# Patient Record
Sex: MALE | Race: WHITE | NOT HISPANIC OR LATINO | Employment: FULL TIME | URBAN - METROPOLITAN AREA
[De-identification: names, ages, dates, MRNs, and addresses within clinical notes are randomized per-mention and may not be internally consistent; named-entity substitution may affect disease eponyms.]

---

## 2017-01-09 ENCOUNTER — ALLSCRIPTS OFFICE VISIT (OUTPATIENT)
Dept: OTHER | Facility: OTHER | Age: 47
End: 2017-01-09

## 2017-01-20 ENCOUNTER — HOSPITAL ENCOUNTER (EMERGENCY)
Facility: HOSPITAL | Age: 47
Discharge: HOME | End: 2017-01-20
Admitting: EMERGENCY MEDICINE
Payer: COMMERCIAL

## 2017-01-20 VITALS
SYSTOLIC BLOOD PRESSURE: 144 MMHG | DIASTOLIC BLOOD PRESSURE: 70 MMHG | TEMPERATURE: 97.2 F | WEIGHT: 180 LBS | OXYGEN SATURATION: 100 % | BODY MASS INDEX: 26.58 KG/M2 | HEART RATE: 74 BPM | RESPIRATION RATE: 20 BRPM

## 2017-01-20 DIAGNOSIS — S61.012A LACERATION OF LEFT THUMB: Primary | ICD-10-CM

## 2017-01-20 PROCEDURE — 99282 EMERGENCY DEPT VISIT SF MDM: CPT

## 2017-01-20 RX ORDER — LIDOCAINE HYDROCHLORIDE 10 MG/ML
5 INJECTION, SOLUTION EPIDURAL; INFILTRATION; INTRACAUDAL; PERINEURAL ONCE
Status: COMPLETED | OUTPATIENT
Start: 2017-01-20 | End: 2017-01-20

## 2017-01-20 RX ORDER — CLONAZEPAM 1 MG/1
TABLET ORAL
COMMUNITY
Start: 2017-01-09 | End: 2018-03-13 | Stop reason: SDUPTHER

## 2017-01-20 RX ORDER — GINSENG 100 MG
1 CAPSULE ORAL ONCE
Status: COMPLETED | OUTPATIENT
Start: 2017-01-20 | End: 2017-01-20

## 2017-01-20 RX ORDER — DIAZEPAM 5 MG/1
5 TABLET ORAL EVERY 8 HOURS PRN
COMMUNITY
End: 2018-03-14

## 2017-01-20 RX ADMIN — LIDOCAINE HYDROCHLORIDE 5 ML: 10 INJECTION, SOLUTION EPIDURAL; INFILTRATION; INTRACAUDAL; PERINEURAL at 19:16

## 2017-01-20 RX ADMIN — BACITRACIN ZINC 1 SMALL APPLICATION: 500 OINTMENT TOPICAL at 19:16

## 2017-02-26 LAB
BUN SERPL-MCNC: 12 MG/DL (ref 6–24)
BUN/CREA RATIO (HISTORICAL): 15 (ref 9–20)
CALCIUM SERPL-MCNC: 8.9 MG/DL (ref 8.7–10.2)
CHLORIDE SERPL-SCNC: 101 MMOL/L (ref 96–106)
CO2 SERPL-SCNC: 24 MMOL/L (ref 18–29)
CREAT SERPL-MCNC: 0.79 MG/DL (ref 0.76–1.27)
EGFR AFRICAN AMERICAN (HISTORICAL): 124 ML/MIN/1.73
EGFR-AMERICAN CALC (HISTORICAL): 107 ML/MIN/1.73
GLUCOSE SERPL-MCNC: 142 MG/DL (ref 65–99)
HBA1C MFR BLD HPLC: 6 % (ref 4.8–5.6)
POTASSIUM SERPL-SCNC: 4.3 MMOL/L (ref 3.5–5.2)
SODIUM SERPL-SCNC: 139 MMOL/L (ref 134–144)

## 2017-02-27 ENCOUNTER — GENERIC CONVERSION - ENCOUNTER (OUTPATIENT)
Dept: OTHER | Facility: OTHER | Age: 47
End: 2017-02-27

## 2017-02-28 ENCOUNTER — GENERIC CONVERSION - ENCOUNTER (OUTPATIENT)
Dept: OTHER | Facility: OTHER | Age: 47
End: 2017-02-28

## 2017-03-20 ENCOUNTER — ALLSCRIPTS OFFICE VISIT (OUTPATIENT)
Dept: OTHER | Facility: OTHER | Age: 47
End: 2017-03-20

## 2017-11-20 ENCOUNTER — GENERIC CONVERSION - ENCOUNTER (OUTPATIENT)
Dept: OTHER | Facility: OTHER | Age: 47
End: 2017-11-20

## 2018-01-10 NOTE — RESULT NOTES
Verified Results  Kia SaldivaranitaRodri BMP8+eGFR [id number] 07:30AM Emily Krause     Test Name Result Flag Reference   Glucose, Serum 142 mg/dL H 65-99   BUN 12 mg/dL  6-24   Creatinine, Serum 0 79 mg/dL  0 76-1 27   eGFR If NonAfricn Am 107 mL/min/1 73  >59   eGFR If Africn Am 124 mL/min/1 73  >59   BUN/Creatinine Ratio 15  9-20   Sodium, Serum 139 mmol/L  134-144   Potassium, Serum 4 3 mmol/L  3 5-5 2   Chloride, Serum 101 mmol/L     Carbon Dioxide, Total 24 mmol/L  18-29   Calcium, Serum 8 9 mg/dL  8 7-10 2     (1) HEMOGLOBIN A1C [id number] 07:30AM Emily Glendale     Test Name Result Flag Reference   Hemoglobin A1c 6 0 % H 4 8-5 6   Pre-diabetes: 5 7 - 6 4           Diabetes: >6 4           Glycemic control for adults with diabetes: <7 0

## 2018-01-12 VITALS
WEIGHT: 182 LBS | RESPIRATION RATE: 18 BRPM | DIASTOLIC BLOOD PRESSURE: 80 MMHG | HEIGHT: 69 IN | SYSTOLIC BLOOD PRESSURE: 130 MMHG | BODY MASS INDEX: 26.96 KG/M2 | HEART RATE: 84 BPM | OXYGEN SATURATION: 98 %

## 2018-01-14 VITALS
WEIGHT: 167 LBS | SYSTOLIC BLOOD PRESSURE: 140 MMHG | DIASTOLIC BLOOD PRESSURE: 90 MMHG | OXYGEN SATURATION: 92 % | HEART RATE: 78 BPM | BODY MASS INDEX: 24.73 KG/M2 | HEIGHT: 69 IN | RESPIRATION RATE: 16 BRPM

## 2018-01-14 NOTE — RESULT NOTES
Message   Recorded as Task   Date: 02/27/2017 11:23 AM, Created By: Black Kirby   Task Name: Call Patient with results   Assigned To: Bayron Farooq   Regarding Patient: Florencio Figueroa, Status: Active   CommentLoletha Confucianist - 27 Feb 2017 11:23 AM     Patient Phone: [phone]    Elevate FBS, but good A1C- should have appt 3-4 Tiffanie Savana - 28 Feb 2017 9:50 AM     TASK EDITED  PT AWARE          Signatures   Electronically signed by : Lyn Guy, ; Feb 28 2017  9:50AM EST                       (Author)

## 2018-01-22 VITALS
DIASTOLIC BLOOD PRESSURE: 82 MMHG | WEIGHT: 162 LBS | BODY MASS INDEX: 23.99 KG/M2 | SYSTOLIC BLOOD PRESSURE: 126 MMHG | HEIGHT: 69 IN

## 2018-02-24 ENCOUNTER — TELEPHONE (OUTPATIENT)
Dept: FAMILY MEDICINE CLINIC | Facility: CLINIC | Age: 48
End: 2018-02-24

## 2018-02-24 DIAGNOSIS — Z12.5 SCREENING PSA (PROSTATE SPECIFIC ANTIGEN): Primary | ICD-10-CM

## 2018-02-24 DIAGNOSIS — E78.00 ELEVATED CHOLESTEROL: ICD-10-CM

## 2018-02-24 DIAGNOSIS — R73.01 ABNORMAL FASTING GLUCOSE: ICD-10-CM

## 2018-03-13 DIAGNOSIS — F41.9 ANXIETY: Primary | ICD-10-CM

## 2018-03-14 RX ORDER — CLONAZEPAM 1 MG/1
TABLET ORAL
Qty: 90 TABLET | Refills: 2 | Status: SHIPPED | OUTPATIENT
Start: 2018-03-14 | End: 2018-06-12 | Stop reason: SDUPTHER

## 2018-03-15 LAB
ALBUMIN SERPL-MCNC: 4.4 G/DL (ref 3.5–5.5)
ALBUMIN/GLOB SERPL: 1.8 {RATIO} (ref 1.2–2.2)
ALP SERPL-CCNC: 45 IU/L (ref 39–117)
ALT SERPL-CCNC: 20 IU/L (ref 0–44)
AST SERPL-CCNC: 20 IU/L (ref 0–40)
BILIRUB SERPL-MCNC: 0.5 MG/DL (ref 0–1.2)
BUN SERPL-MCNC: 10 MG/DL (ref 6–24)
BUN/CREAT SERPL: 10 (ref 9–20)
CALCIUM SERPL-MCNC: 9.2 MG/DL (ref 8.7–10.2)
CHLORIDE SERPL-SCNC: 100 MMOL/L (ref 96–106)
CHOLEST SERPL-MCNC: 187 MG/DL (ref 100–199)
CHOLEST/HDLC SERPL: 3.6 RATIO UNITS (ref 0–5)
CO2 SERPL-SCNC: 27 MMOL/L (ref 18–29)
CREAT SERPL-MCNC: 0.97 MG/DL (ref 0.76–1.27)
EST. AVERAGE GLUCOSE BLD GHB EST-MCNC: 111 MG/DL
GLOBULIN SER-MCNC: 2.4 G/DL (ref 1.5–4.5)
GLUCOSE SERPL-MCNC: 128 MG/DL (ref 65–99)
HBA1C MFR BLD: 5.5 % (ref 4.8–5.6)
HDLC SERPL-MCNC: 52 MG/DL
LDLC SERPL CALC-MCNC: 118 MG/DL (ref 0–99)
POTASSIUM SERPL-SCNC: 4.1 MMOL/L (ref 3.5–5.2)
PROT SERPL-MCNC: 6.8 G/DL (ref 6–8.5)
PSA FREE MFR SERPL: 26.7 %
PSA FREE SERPL-MCNC: 0.24 NG/ML
PSA SERPL-MCNC: 0.9 NG/ML (ref 0–4)
SL AMB EGFR AFRICAN AMERICAN: 106 ML/MIN/1.73
SL AMB EGFR NON AFRICAN AMERICAN: 92 ML/MIN/1.73
SL AMB VLDL CHOLESTEROL CALC: 17 MG/DL (ref 5–40)
SODIUM SERPL-SCNC: 141 MMOL/L (ref 134–144)
TRIGL SERPL-MCNC: 84 MG/DL (ref 0–149)

## 2018-03-19 ENCOUNTER — OFFICE VISIT (OUTPATIENT)
Dept: FAMILY MEDICINE CLINIC | Facility: CLINIC | Age: 48
End: 2018-03-19
Payer: COMMERCIAL

## 2018-03-19 VITALS
OXYGEN SATURATION: 97 % | RESPIRATION RATE: 16 BRPM | HEART RATE: 82 BPM | SYSTOLIC BLOOD PRESSURE: 120 MMHG | DIASTOLIC BLOOD PRESSURE: 80 MMHG | HEIGHT: 69 IN | BODY MASS INDEX: 25.48 KG/M2 | WEIGHT: 172 LBS

## 2018-03-19 DIAGNOSIS — R73.01 IMPAIRED FASTING BLOOD SUGAR: ICD-10-CM

## 2018-03-19 DIAGNOSIS — Z00.00 ENCOUNTER FOR WELLNESS EXAMINATION IN ADULT: Primary | ICD-10-CM

## 2018-03-19 DIAGNOSIS — F41.1 GENERALIZED ANXIETY DISORDER: ICD-10-CM

## 2018-03-19 DIAGNOSIS — F17.210 CIGARETTE NICOTINE DEPENDENCE WITHOUT COMPLICATION: ICD-10-CM

## 2018-03-19 DIAGNOSIS — J45.20 MILD INTERMITTENT ASTHMA WITHOUT COMPLICATION: ICD-10-CM

## 2018-03-19 DIAGNOSIS — F32.1 MODERATE SINGLE CURRENT EPISODE OF MAJOR DEPRESSIVE DISORDER (HCC): ICD-10-CM

## 2018-03-19 DIAGNOSIS — Z23 NEED FOR DIPHTHERIA-TETANUS-PERTUSSIS (TDAP) VACCINE: ICD-10-CM

## 2018-03-19 PROBLEM — F32.9 SINGLE CURRENT EPISODE OF MAJOR DEPRESSIVE DISORDER: Status: ACTIVE | Noted: 2018-03-19

## 2018-03-19 PROCEDURE — 90471 IMMUNIZATION ADMIN: CPT

## 2018-03-19 PROCEDURE — 99396 PREV VISIT EST AGE 40-64: CPT | Performed by: FAMILY MEDICINE

## 2018-03-19 PROCEDURE — 99214 OFFICE O/P EST MOD 30 MIN: CPT | Performed by: FAMILY MEDICINE

## 2018-03-19 PROCEDURE — 90715 TDAP VACCINE 7 YRS/> IM: CPT

## 2018-03-19 RX ORDER — FLUOXETINE HYDROCHLORIDE 20 MG/1
40 CAPSULE ORAL DAILY
Qty: 180 EACH | Refills: 1 | Status: SHIPPED | OUTPATIENT
Start: 2018-03-19 | End: 2018-09-19 | Stop reason: ALTCHOICE

## 2018-03-19 NOTE — PROGRESS NOTES
HPI:  Amina Parry is a 49 y o  male here for his yearly health maintenance exam    Patient Active Problem List   Diagnosis    Mild intermittent asthma without complication    Elevated fasting blood sugar    Cigarette nicotine dependence without complication    Anxiety disorder     Past Medical History:   Diagnosis Date    Anxiety     Asthma     Depression     Hyperlipemia     RESOLVED [id number]    Medial meniscus tear     LAST ASSESSED [id number]    Psychiatric disorder        1  Advanced Directive: no     2  Durable Power of  for Healthcare: no     3  Social History:               Marital History:               Work Status: full time              Drug and alcohol History: no drug use              Alcohol Use: in recovery     4  General Health: good              Regular Dental Visits:no              Vision problems:no              Hearing Loss:no              Immunizations up to date: due                 Lifestyle:                           Healthy Diet:yes                          Tobacco Use:current 1/2 ppd                          Regular exercise:no                          Weight concerns:no                          Drug abuse: no     5  Reproductive Health (females only)              Premenopausal:-              Perimenopausal:-              Postmenopausal: -                 Menstrual Problems: -              Contraceptions: -              Sexually Active:yes              Pregnancy History:-     6   Over the past 2 weeks, how often have you been bothered by the following:              Little interest or pleasure in doing things:> 1/2 days              Felling down, depressed or hopeless: > 1/2 days    Current Outpatient Prescriptions   Medication Sig Dispense Refill    Albuterol Sulfate (PROAIR RESPICLICK) 774 (90 BASE) MCG/ACT AEPB Inhale      clonazePAM (KlonoPIN) 1 mg tablet TAKE ONE TABLET BY MOUTH EVERY 8 HOURS 90 tablet 2    Fluoxetine HCl, PMDD, 20 MG CAPS Take by mouth No current facility-administered medications for this visit  Allergies   Allergen Reactions    Erythromycin Diarrhea     Other reaction(s): Abdominal pain     Immunization History   Administered Date(s) Administered    Influenza Quadrivalent Preservative Free 3 years and older IM 01/09/2017       Patient Care Team:  Donnell Osman MD as PCP - General  Lubna Alvarez MD      Physical Exam :  Physical Exam  /80 (BP Location: Left arm, Patient Position: Sitting, Cuff Size: Standard)   Pulse 82   Resp 16   Ht 5' 9" (1 753 m)   Wt 78 kg (172 lb)   SpO2 97%   BMI 25 40 kg/m²     General Appearance:    Alert, cooperative, no distress, appears stated age   Head:    Normocephalic, without obvious abnormality, atraumatic   Eyes:    PERRL, conjunctiva/corneas clear, EOM's intact, fundi     benign, both eyes        Ears:    Normal TM's and external ear canals, both ears   Nose:   Nares normal, septum midline, mucosa normal, no drainage    or sinus tenderness   Throat:   Lips, mucosa, and tongue normal; teeth and gums normal   Neck:   Supple, symmetrical, trachea midline, no adenopathy;        thyroid:  No enlargement/tenderness/nodules; no carotid    bruit or JVD   Lungs:     Clear to auscultation bilaterally, respirations unlabored   Chest wall:    No tenderness or deformity   Heart:    Regular rate and rhythm, S1 and S2 normal, no murmur, rub   or gallop   Abdomen:     Soft, non-tender, bowel sounds active all four quadrants,     no masses, no organomegaly   Extremities:   Extremities normal, atraumatic, no cyanosis or edema   Pulses:   2+ and symmetric all extremities   Skin:   Skin color, texture, turgor normal, no rashes or lesions   Lymph nodes:   Cervical and supraclavicular nodes normal   Neurologic:   CNII-XII intact   Normal strength, sensation and reflexes       throughout         Reviewed Updated [facility] Prior Wellness Visits:   Last Health Maintenance visit information was reviewed, patient interviewed , no change since last HM visit yes  Last HM visit information was reviewed, patient interviewed and updates made to the record today yes    Assessment and Plan:  No diagnosis found  Health Maintenance Due   Topic Date Due    HIV SCREENING  1970    PNEUMOCOCCAL POLYSACCHARIDE VACCINE AGE 2-64 HIGH RISK  05/03/1971    Depression Screening PHQ-9  05/03/1981    DTaP,Tdap,and Td Vaccines (1 - Tdap) 05/03/1990    INFLUENZA VACCINE  09/01/2017      Patient Instructions   Adacel today  Id advanced directives discussed and forms provided  Metabolic screens are current  Cancer screens are current  Positive depression screen-patient being treated currently

## 2018-03-19 NOTE — PATIENT INSTRUCTIONS
Adacel today  Id advanced directives discussed and forms provided  Metabolic screens are current  Cancer screens are current  Positive depression screen-patient being treated currently  Continue current medications  Advise attempting decrease cigarette use  Repeat labs 1 year  Office visits every 6 months

## 2018-06-11 ENCOUNTER — OFFICE VISIT (OUTPATIENT)
Dept: FAMILY MEDICINE CLINIC | Facility: CLINIC | Age: 48
End: 2018-06-11
Payer: COMMERCIAL

## 2018-06-11 VITALS
RESPIRATION RATE: 18 BRPM | HEART RATE: 90 BPM | SYSTOLIC BLOOD PRESSURE: 110 MMHG | DIASTOLIC BLOOD PRESSURE: 70 MMHG | OXYGEN SATURATION: 97 % | BODY MASS INDEX: 25.33 KG/M2 | WEIGHT: 171 LBS | HEIGHT: 69 IN | TEMPERATURE: 98.4 F

## 2018-06-11 DIAGNOSIS — J20.9 ACUTE BRONCHITIS, UNSPECIFIED ORGANISM: Primary | ICD-10-CM

## 2018-06-11 PROCEDURE — 99213 OFFICE O/P EST LOW 20 MIN: CPT | Performed by: FAMILY MEDICINE

## 2018-06-11 NOTE — PATIENT INSTRUCTIONS
This appears to be a viral bronchitis  I would continue seeing the ProAir inhaler as needed  I will give you a written prescription for an antibiotic to be filled if symptoms persist Dulera 2 puffs twice daily- Rinse mouth after use  Robitussin or Mucinex as an expectorant

## 2018-06-11 NOTE — PROGRESS NOTES
[address] [facility]        NAME: Javi Mccarty is a 51 y o  male  : 1970    MRN: [id number]  DATE: 2018  TIME: 11:19 AM    Assessment and Plan   Acute bronchitis, unspecified organism [J20 [id number]]  1  Acute bronchitis, unspecified organism           Patient Instructions     Patient Instructions   This appears to be a viral bronchitis  I would continue seeing the ProAir inhaler as needed  I will give you a written prescription for an antibiotic to be filled if symptoms persist Dulera 2 puffs twice daily- Rinse mouth after use  Robitussin or Mucinex as an expectorant  Chief Complaint     Chief Complaint   Patient presents with    Cold Like Symptoms     cold sxs         History of Present Illness       Feeling ill over the last 3 days  Initially some fevers  The seems to have resolved  Some cough with slight wheezing sensation, this he took the inhaler he has  No current fevers  Nose has dried up because he took Afrin nasal spray  Review of Systems   Review of Systems   Constitutional: Positive for diaphoresis and fever  Negative for appetite change and chills  HENT: Positive for rhinorrhea and sinus pressure  Negative for ear pain and sore throat  Eyes: Negative for discharge, redness and itching  Respiratory: Positive for cough and wheezing  Negative for shortness of breath  Cardiovascular: Negative for chest pain and palpitations  Rapid or slow heart rate   Gastrointestinal: Positive for diarrhea  Negative for abdominal pain, nausea and vomiting           Current Medications       Current Outpatient Prescriptions:     Albuterol Sulfate (PROAIR RESPICLICK) 443 (90 BASE) MCG/ACT AEPB, Inhale, Disp: , Rfl:     clonazePAM (KlonoPIN) 1 mg tablet, TAKE ONE TABLET BY MOUTH EVERY 8 HOURS, Disp: 90 tablet, Rfl: 2    Fluoxetine HCl, PMDD, 20 MG CAPS, Take 2 capsules (40 mg total) by mouth daily, Disp: 180 each, Rfl: 1    Current Allergies Allergies as of 06/11/2018 - Reviewed 06/11/2018   Allergen Reaction Noted    Erythromycin Diarrhea 12/02/2016            The following portions of the patient's history were reviewed and updated as appropriate: allergies, current medications, past family history, past medical history, past social history, past surgical history and problem list      Past Medical History:   Diagnosis Date    Anxiety     Asthma     Depression     Hyperlipemia     RESOLVED [id number]    Medial meniscus tear     LAST ASSESSED [id number]    Psychiatric disorder        Past Surgical History:   Procedure Laterality Date    ARTHROSCOPY KNEE Right     ONSET 7/3/2014    TONSILLECTOMY AND ADENOIDECTOMY      TOOTH EXTRACTION      WISDOM TEETH       Family History   Problem Relation Age of Onset    Diabetes Mother     Hypertension Mother     Prostate cancer Father     Substance Abuse Neg Hx     Mental illness Neg Hx          Medications have been verified  Objective   /70 (BP Location: Left arm, Patient Position: Sitting, Cuff Size: Standard)   Pulse 90   Temp 98 4 °F (36 9 °C) (Oral)   Resp 18   Ht 5' 9" (1 753 m)   Wt 77 6 kg (171 lb)   SpO2 97%   BMI 25 25 kg/m²        Physical Exam     Physical Exam   Constitutional: He is oriented to person, place, and time  He appears well-developed and well-nourished  No distress  HENT:   Head: Normocephalic and atraumatic  Right Ear: Tympanic membrane, external ear and ear canal normal    Left Ear: Tympanic membrane, external ear and ear canal normal    Nose: No mucosal edema or rhinorrhea  Right sinus exhibits no maxillary sinus tenderness  Left sinus exhibits no maxillary sinus tenderness  Mouth/Throat: Uvula is midline  Mucous membranes are not pale and not dry  Normal dentition  No oropharyngeal exudate  Eyes: EOM are normal  Pupils are equal, round, and reactive to light  Right eye exhibits no discharge  Left eye exhibits no discharge     Neck: Normal range of motion  Neck supple  No thyromegaly present  Cardiovascular: Normal rate, regular rhythm, normal heart sounds and intact distal pulses  No murmur heard  Pulmonary/Chest: Effort normal  No respiratory distress  He has wheezes in the right lower field and the left lower field  He has rhonchi in the right lower field and the left lower field  He has no rales  Musculoskeletal: Normal range of motion  He exhibits no edema or tenderness  Lymphadenopathy:     He has no cervical adenopathy  Neurological: He is alert and oriented to person, place, and time  No cranial nerve deficit  Skin: He is not diaphoretic  Psychiatric: He has a normal mood and affect   His behavior is normal

## 2018-06-12 DIAGNOSIS — F41.9 ANXIETY: ICD-10-CM

## 2018-06-12 RX ORDER — CLONAZEPAM 1 MG/1
TABLET ORAL
Qty: 90 TABLET | Refills: 2 | Status: SHIPPED | OUTPATIENT
Start: 2018-06-12 | End: 2018-09-04 | Stop reason: SDUPTHER

## 2018-06-20 ENCOUNTER — OFFICE VISIT (OUTPATIENT)
Dept: URGENT CARE | Facility: CLINIC | Age: 48
End: 2018-06-20
Payer: COMMERCIAL

## 2018-06-20 ENCOUNTER — APPOINTMENT (EMERGENCY)
Dept: CT IMAGING | Facility: HOSPITAL | Age: 48
End: 2018-06-20
Payer: COMMERCIAL

## 2018-06-20 ENCOUNTER — HOSPITAL ENCOUNTER (EMERGENCY)
Facility: HOSPITAL | Age: 48
Discharge: HOME | End: 2018-06-20
Attending: EMERGENCY MEDICINE | Admitting: EMERGENCY MEDICINE
Payer: COMMERCIAL

## 2018-06-20 VITALS
WEIGHT: 170 LBS | BODY MASS INDEX: 25.18 KG/M2 | DIASTOLIC BLOOD PRESSURE: 86 MMHG | HEIGHT: 69 IN | TEMPERATURE: 97.6 F | HEART RATE: 88 BPM | RESPIRATION RATE: 24 BRPM | SYSTOLIC BLOOD PRESSURE: 110 MMHG

## 2018-06-20 VITALS
WEIGHT: 169.97 LBS | TEMPERATURE: 97 F | OXYGEN SATURATION: 100 % | RESPIRATION RATE: 20 BRPM | SYSTOLIC BLOOD PRESSURE: 132 MMHG | DIASTOLIC BLOOD PRESSURE: 86 MMHG | BODY MASS INDEX: 25.1 KG/M2 | HEART RATE: 77 BPM

## 2018-06-20 DIAGNOSIS — K57.92 DIVERTICULITIS: Primary | ICD-10-CM

## 2018-06-20 DIAGNOSIS — R10.32 LEFT LOWER QUADRANT PAIN: Primary | ICD-10-CM

## 2018-06-20 LAB
ALBUMIN SERPL BCP-MCNC: 3.8 G/DL (ref 3.5–5)
ALP SERPL-CCNC: 57 U/L (ref 46–116)
ALT SERPL W P-5'-P-CCNC: 30 U/L (ref 12–78)
ANION GAP SERPL CALCULATED.3IONS-SCNC: 10 MMOL/L (ref 4–13)
AST SERPL W P-5'-P-CCNC: 21 U/L (ref 5–45)
BASOPHILS # BLD AUTO: 0.02 THOUSANDS/ΜL (ref 0–0.1)
BASOPHILS NFR BLD AUTO: 0 % (ref 0–1)
BILIRUB SERPL-MCNC: 0.7 MG/DL (ref 0.2–1)
BILIRUB UR QL STRIP: NEGATIVE
BUN SERPL-MCNC: 11 MG/DL (ref 5–25)
CALCIUM SERPL-MCNC: 8.8 MG/DL (ref 8.3–10.1)
CHLORIDE SERPL-SCNC: 104 MMOL/L (ref 100–108)
CLARITY UR: CLEAR
CO2 SERPL-SCNC: 26 MMOL/L (ref 21–32)
COLOR UR: YELLOW
CREAT SERPL-MCNC: 0.83 MG/DL (ref 0.6–1.3)
EOSINOPHIL # BLD AUTO: 0.14 THOUSAND/ΜL (ref 0–0.61)
EOSINOPHIL NFR BLD AUTO: 2 % (ref 0–6)
ERYTHROCYTE [DISTWIDTH] IN BLOOD BY AUTOMATED COUNT: 12.6 % (ref 11.6–15.1)
GFR SERPL CREATININE-BSD FRML MDRD: 103 ML/MIN/1.73SQ M
GLUCOSE SERPL-MCNC: 126 MG/DL (ref 65–140)
GLUCOSE UR STRIP-MCNC: NEGATIVE MG/DL
HCT VFR BLD AUTO: 44.4 % (ref 36.5–49.3)
HGB BLD-MCNC: 15.4 G/DL (ref 12–17)
HGB UR QL STRIP.AUTO: NEGATIVE
IMM GRANULOCYTES # BLD AUTO: 0.02 THOUSAND/UL (ref 0–0.2)
IMM GRANULOCYTES NFR BLD AUTO: 0 % (ref 0–2)
KETONES UR STRIP-MCNC: NEGATIVE MG/DL
LEUKOCYTE ESTERASE UR QL STRIP: NEGATIVE
LIPASE SERPL-CCNC: 107 U/L (ref 73–393)
LYMPHOCYTES # BLD AUTO: 1.6 THOUSANDS/ΜL (ref 0.6–4.47)
LYMPHOCYTES NFR BLD AUTO: 17 % (ref 14–44)
MCH RBC QN AUTO: 31.6 PG (ref 26.8–34.3)
MCHC RBC AUTO-ENTMCNC: 34.7 G/DL (ref 31.4–37.4)
MCV RBC AUTO: 91 FL (ref 82–98)
MONOCYTES # BLD AUTO: 0.59 THOUSAND/ΜL (ref 0.17–1.22)
MONOCYTES NFR BLD AUTO: 6 % (ref 4–12)
NEUTROPHILS # BLD AUTO: 7.08 THOUSANDS/ΜL (ref 1.85–7.62)
NEUTS SEG NFR BLD AUTO: 75 % (ref 43–75)
NITRITE UR QL STRIP: NEGATIVE
NRBC BLD AUTO-RTO: 0 /100 WBCS
PH UR STRIP.AUTO: 7 [PH] (ref 4.5–8)
PLATELET # BLD AUTO: 303 THOUSANDS/UL (ref 149–390)
PMV BLD AUTO: 9.1 FL (ref 8.9–12.7)
POTASSIUM SERPL-SCNC: 4 MMOL/L (ref 3.5–5.3)
PROT SERPL-MCNC: 7.6 G/DL (ref 6.4–8.2)
PROT UR STRIP-MCNC: NEGATIVE MG/DL
RBC # BLD AUTO: 4.88 MILLION/UL (ref 3.88–5.62)
SODIUM SERPL-SCNC: 140 MMOL/L (ref 136–145)
SP GR UR STRIP.AUTO: <=1.005 (ref 1–1.03)
UROBILINOGEN UR QL STRIP.AUTO: 0.2 E.U./DL
WBC # BLD AUTO: 9.45 THOUSAND/UL (ref 4.31–10.16)

## 2018-06-20 PROCEDURE — 80053 COMPREHEN METABOLIC PANEL: CPT | Performed by: EMERGENCY MEDICINE

## 2018-06-20 PROCEDURE — 74177 CT ABD & PELVIS W/CONTRAST: CPT

## 2018-06-20 PROCEDURE — 83690 ASSAY OF LIPASE: CPT | Performed by: EMERGENCY MEDICINE

## 2018-06-20 PROCEDURE — 96361 HYDRATE IV INFUSION ADD-ON: CPT

## 2018-06-20 PROCEDURE — 81003 URINALYSIS AUTO W/O SCOPE: CPT | Performed by: EMERGENCY MEDICINE

## 2018-06-20 PROCEDURE — 99284 EMERGENCY DEPT VISIT MOD MDM: CPT

## 2018-06-20 PROCEDURE — 96374 THER/PROPH/DIAG INJ IV PUSH: CPT

## 2018-06-20 PROCEDURE — 36415 COLL VENOUS BLD VENIPUNCTURE: CPT | Performed by: EMERGENCY MEDICINE

## 2018-06-20 PROCEDURE — 99213 OFFICE O/P EST LOW 20 MIN: CPT | Performed by: NURSE PRACTITIONER

## 2018-06-20 PROCEDURE — 85025 COMPLETE CBC W/AUTO DIFF WBC: CPT | Performed by: EMERGENCY MEDICINE

## 2018-06-20 RX ORDER — TRAMADOL HYDROCHLORIDE 50 MG/1
50 TABLET ORAL EVERY 6 HOURS PRN
Qty: 12 TABLET | Refills: 0 | Status: SHIPPED | OUTPATIENT
Start: 2018-06-20 | End: 2018-06-30

## 2018-06-20 RX ORDER — DICYCLOMINE HCL 20 MG
20 TABLET ORAL EVERY 6 HOURS PRN
Qty: 20 TABLET | Refills: 0 | Status: SHIPPED | OUTPATIENT
Start: 2018-06-20 | End: 2018-09-19 | Stop reason: ALTCHOICE

## 2018-06-20 RX ORDER — METRONIDAZOLE 500 MG/1
500 TABLET ORAL ONCE
Status: COMPLETED | OUTPATIENT
Start: 2018-06-20 | End: 2018-06-20

## 2018-06-20 RX ORDER — KETOROLAC TROMETHAMINE 30 MG/ML
30 INJECTION, SOLUTION INTRAMUSCULAR; INTRAVENOUS ONCE
Status: COMPLETED | OUTPATIENT
Start: 2018-06-20 | End: 2018-06-20

## 2018-06-20 RX ORDER — CIPROFLOXACIN 500 MG/1
500 TABLET, FILM COATED ORAL 2 TIMES DAILY
Qty: 20 TABLET | Refills: 0 | Status: SHIPPED | OUTPATIENT
Start: 2018-06-20 | End: 2018-06-30

## 2018-06-20 RX ORDER — CIPROFLOXACIN 500 MG/1
500 TABLET, FILM COATED ORAL ONCE
Status: COMPLETED | OUTPATIENT
Start: 2018-06-20 | End: 2018-06-20

## 2018-06-20 RX ORDER — METRONIDAZOLE 500 MG/1
500 TABLET ORAL EVERY 8 HOURS SCHEDULED
Qty: 30 TABLET | Refills: 0 | Status: SHIPPED | OUTPATIENT
Start: 2018-06-20 | End: 2018-06-30

## 2018-06-20 RX ORDER — ONDANSETRON 4 MG/1
4 TABLET, ORALLY DISINTEGRATING ORAL EVERY 8 HOURS PRN
Qty: 12 TABLET | Refills: 0 | Status: SHIPPED | OUTPATIENT
Start: 2018-06-20 | End: 2018-09-19 | Stop reason: ALTCHOICE

## 2018-06-20 RX ADMIN — KETOROLAC TROMETHAMINE 30 MG: 30 INJECTION, SOLUTION INTRAMUSCULAR; INTRAVENOUS at 10:57

## 2018-06-20 RX ADMIN — IOHEXOL 100 ML: 350 INJECTION, SOLUTION INTRAVENOUS at 10:48

## 2018-06-20 RX ADMIN — METRONIDAZOLE 500 MG: 500 TABLET ORAL at 11:53

## 2018-06-20 RX ADMIN — SODIUM CHLORIDE 1000 ML: 0.9 INJECTION, SOLUTION INTRAVENOUS at 09:31

## 2018-06-20 RX ADMIN — CIPROFLOXACIN 500 MG: 500 TABLET, FILM COATED ORAL at 11:53

## 2018-06-20 NOTE — PATIENT INSTRUCTIONS
Pt to go to [facility] ER for abdominal pain  ER made aware  Pt refused ambulance and will drive self

## 2018-06-20 NOTE — DISCHARGE INSTRUCTIONS
Clear liquids while having any pain on the antibiotics  Once pain resolved but still on antibiotics, follow a low residue diet (attached)  After antibiotics completed, resume a regular diet  Contact gastroenterology to schedule follow up once antibiotics complete or sooner for any worsening symptoms  Return for fever more than 101, worsening pain, persistent vomiting or for any concerns    Diverticulitis   WHAT YOU NEED TO KNOW:   Diverticulitis is a condition that causes small pockets along your intestine called diverticula to become inflamed or infected  This is caused by hard bowel movements, food, or bacteria that get stuck in the pockets  DISCHARGE INSTRUCTIONS:   Return to the emergency department if:   · You have bowel movement or foul-smelling discharge leaking from your vagina or in your urine  · You have severe diarrhea  · You urinate less than usual or not at all  · You are not able to have a bowel movement  · You cannot stop vomiting  · You have severe abdominal pain, a fever, and your abdomen is larger than usual      · You have new or increased blood in your bowel movements  Contact your healthcare provider if:   · You have pain when you urinate  · Your symptoms get worse or do not go away  · You have questions or concerns about your condition or care  Medicines:   · Antibiotics  may be given to help treat a bacterial infection  · Prescription pain medicine  may be given  Ask your healthcare provider how to take this medicine safely  Some prescription pain medicines contain acetaminophen  Do not take other medicines that contain acetaminophen without talking to your healthcare provider  Too much acetaminophen may cause liver damage  Prescription pain medicine may cause constipation  Ask your healthcare provider how to prevent or treat constipation  · Take your medicine as directed    Contact your healthcare provider if you think your medicine is not helping or if you have side effects  Tell him or her if you are allergic to any medicine  Keep a list of the medicines, vitamins, and herbs you take  Include the amounts, and when and why you take them  Bring the list or the pill bottles to follow-up visits  Carry your medicine list with you in case of an emergency  Clear liquid diet:  A clear liquid diet includes any liquids that you can see through  Examples include water, ginger-juanito, cranberry or apple juice, frozen fruit ice, or broth  Stay on a clear liquid diet until your symptoms are gone, or as directed  Follow up with your healthcare provider as directed: You may need to return for a colonoscopy  When your symptoms are gone, you may need a low-fat, high-fiber diet to prevent diverticulitis from developing again  Your healthcare provider or dietitian can help you create meal plans  Write down your questions so you remember to ask them during your visits  © 2017 [employer] Information is for End User's use only and may not be sold, redistributed or otherwise used for commercial purposes  All illustrations and images included in CareNotes® are the copyrighted property of [employer]  or [employer]  The above information is an  only  It is not intended as medical advice for individual conditions or treatments  Talk to your doctor, nurse or pharmacist before following any medical regimen to see if it is safe and effective for you  Diverticulitis Diet   WHAT YOU NEED TO KNOW:   A diverticulitis diet includes foods that allow your intestines to rest while you have diverticulitis  Diverticulitis is a condition that causes small pockets along your intestine called diverticula to become inflamed or infected  This is caused by hard bowel movement, food, or bacteria that get stuck in the pockets    DISCHARGE INSTRUCTIONS:   Foods you can eat while you have diverticulitis:   · A clear liquid diet may be recommended for 2 to 3 days  A clear liquid diet is made up of clear liquids and foods that are liquid at room temperature  Your healthcare provider will tell you when you can start eating solid foods  Examples of clear liquids include the following:     ¨ Water and clear juices (such as apple, cranberry, or grape), strained citrus juices or fruit punch    ¨ Coffee or tea (without cream or milk)    ¨ Clear sports drinks or soft drinks, such as ginger ale, lemon-lime soda, or club soda (no cola or root beer)    ¨ Clear broth, bouillon, or consommé    ¨ Plain popsicles (no popsicles with pureed fruit or fiber)    ¨ Flavored gelatin without fruit    · A low-fiber diet may be recommended until your symptoms improve  Your healthcare provider will tell you when you can slowly add high-fiber foods back into your diet  ¨ Cream of wheat and finely ground grits    ¨ White bread, white pasta, and white rice    ¨ Canned and well-cooked fruit without skins or seeds, and juice without pulp    ¨ Canned and well-cooked vegetables without skins or seeds, and vegetable juice    ¨ Cow's milk, lactose-free milk, soy milk, and rice milk    ¨ Yogurt, cottage cheese, and sherbet    ¨ Eggs, poultry (such as chicken and turkey), fish, and tender, ground, well-cooked beef     ¨ Tofu and smooth nut butters, such as peanut butter    ¨ Broth and strained soups made of low-fiber foods  Foods you should avoid while you have diverticulitis:  Avoid foods that are high in fiber while you have symptoms of diverticulitis  Examples of high-fiber foods include the following:  · Whole grains and breads, and cereals made with whole grains    · Dried fruit, fresh fruit with skin, and fruit pulp    · Raw vegetables    · Cooked greens, such as spinach    · Tough meat and meat with gristle    · Legumes, such as trinh beans and lentils  Contact your healthcare provider if:   · Your symptoms do not get better, or they get worse       · You have questions about the foods you should eat  · You have questions or concerns about your condition or care  © 2017 [employer] Information is for End User's use only and may not be sold, redistributed or otherwise used for commercial purposes  All illustrations and images included in CareNotes® are the copyrighted property of [employer]  or [address]  The above information is an  only  It is not intended as medical advice for individual conditions or treatments  Talk to your doctor, nurse or pharmacist before following any medical regimen to see if it is safe and effective for you

## 2018-06-20 NOTE — ED NOTES
Presents to ED with HX of Diverticulitis  Was referred to ED by Urgent care for same       Vince Mar RN  06/20/18 [id number]

## 2018-06-20 NOTE — ED PROVIDER NOTES
History  Chief Complaint   Patient presents with    Abdominal Pain     To ED with c/o LLQ abd  pain since yesterday  Denies any fever, chills, nausea or vomiting  States that he feels constipation, did move bowels  Hx of divertic, last episode about 4y ago  Here w/ llq pain since yesterday, gradually worsening  Notes some nausea, anorexia, no sig changes in bms or urination  Denies f/c/s  Feels like prev divertic episodes  No other co        History provided by:  Patient and spouse   used: No    Abdominal Pain   Pain location:  LLQ  Pain quality: aching and sharp    Pain radiates to:  Does not radiate  Pain severity:  Moderate  Onset quality:  Gradual  Timing:  Constant  Progression:  Waxing and waning  Chronicity:  Recurrent  Context: not previous surgeries, not recent illness, not sick contacts and not suspicious food intake    Relieved by:  Nothing  Worsened by:  Palpation and position changes  Ineffective treatments:  None tried  Associated symptoms: anorexia, flatus and nausea    Associated symptoms: no chest pain, no chills, no constipation, no diarrhea, no dysuria, no fatigue, no fever, no melena and no vomiting    Risk factors: has not had multiple surgeries        Prior to Admission Medications   Prescriptions Last Dose Informant Patient Reported? Taking?    Albuterol Sulfate (PROAIR RESPICLICK) 149 (90 BASE) MCG/ACT AEPB   Yes No   Sig: Inhale   Fluoxetine HCl, PMDD, 20 MG CAPS   No No   Sig: Take 2 capsules (40 mg total) by mouth daily   clonazePAM (KlonoPIN) 1 mg tablet   No No   Sig: TAKE 1 TABLET BY MOUTH EVERY 8 HOURS      Facility-Administered Medications: None       Past Medical History:   Diagnosis Date    Anxiety     Asthma     Depression     Hyperlipemia     RESOLVED [id number]    Medial meniscus tear     LAST ASSESSED [id number]    Psychiatric disorder        Past Surgical History:   Procedure Laterality Date    ARTHROSCOPY KNEE Right     ONSET 7/3/2014    TONSILLECTOMY AND ADENOIDECTOMY      TOOTH EXTRACTION      WISDOM TEETH       Family History   Problem Relation Age of Onset    Diabetes Mother     Hypertension Mother     Prostate cancer Father     Substance Abuse Neg Hx     Mental illness Neg Hx      I have reviewed and agree with the history as documented  Social History   Substance Use Topics    Smoking status: Current Every Day Smoker     Packs/day: 0 50     Years: 30 00     Types: Cigarettes    Smokeless tobacco: Never Used    Alcohol use No      Comment: OCCASIONAL USE PER ALLSCRIPTS         Review of Systems   Constitutional: Negative for chills, fatigue and fever  Cardiovascular: Negative for chest pain  Gastrointestinal: Positive for abdominal pain, anorexia, flatus and nausea  Negative for constipation, diarrhea, melena and vomiting  Genitourinary: Negative for dysuria  All other systems reviewed and are negative  Physical Exam  Physical Exam   Constitutional: He appears well-developed and well-nourished  HENT:   Nose: Nose normal    Eyes: Conjunctivae are normal    Neck: Neck supple  Cardiovascular: Normal rate  Pulmonary/Chest: Effort normal    Abdominal: Soft  Normal appearance and bowel sounds are normal  There is tenderness in the left lower quadrant  There is rebound  There is no rigidity, no guarding and no CVA tenderness  Musculoskeletal: He exhibits no deformity  Neurological: He is alert  Skin: Skin is warm  Psychiatric: He has a normal mood and affect  Nursing note and vitals reviewed        Vital Signs  ED Triage Vitals   Temperature Pulse Respirations Blood Pressure SpO2   06/20/18 0930 06/20/18 0930 06/20/18 0930 06/20/18 0930 06/20/18 0930   (!) 97 °F (36 1 °C) 80 20 150/90 100 %      Temp Source Heart Rate Source Patient Position - Orthostatic VS BP Location FiO2 (%)   06/20/18 0930 06/20/18 0930 06/20/18 0930 06/20/18 0930 --   Tympanic Monitor Sitting Right arm       Pain Score       06/20/18 0915 7           Vitals:    06/20/18 0930 06/20/18 1100   BP: 150/90 132/86   Pulse: 80 77   Patient Position - Orthostatic VS: Sitting        Visual Acuity      ED Medications  Medications   sodium chloride 0 9 % bolus 500 mL (0 mL Intravenous Stopped 6/20/18 1057)   ketorolac (TORADOL) injection 30 mg (30 mg Intravenous Given 6/20/18 1057)   iohexol (OMNIPAQUE) 350 MG/ML injection (MULTI-DOSE) 100 mL (100 mL Intravenous Given 6/20/18 1048)   ciprofloxacin (CIPRO) tablet 500 mg (500 mg Oral Given 6/20/18 1153)   metroNIDAZOLE (FLAGYL) tablet 500 mg (500 mg Oral Given 6/20/18 1153)       Diagnostic Studies  Results Reviewed     Procedure Component Value Units Date/Time    Comprehensive metabolic panel [[id number]] Collected:  06/20/18 0930    Lab Status:  Final result Specimen:  Blood from Arm, Right Updated:  06/20/18 1025     Sodium 140 mmol/L      Potassium 4 0 mmol/L      Chloride 104 mmol/L      CO2 26 mmol/L      Anion Gap 10 mmol/L      BUN 11 mg/dL      Creatinine 0 83 mg/dL      Glucose 126 mg/dL      Calcium 8 8 mg/dL      AST 21 U/L      ALT 30 U/L      Alkaline Phosphatase 57 U/L      Total Protein 7 6 g/dL      Albumin 3 8 g/dL      Total Bilirubin 0 70 mg/dL      eGFR 103 ml/min/1 73sq m     Narrative:         National Kidney Disease Education Program recommendations are as follows:  GFR calculation is accurate only with a steady state creatinine  Chronic Kidney disease less than 60 ml/min/1 73 sq  meters  Kidney failure less than 15 ml/min/1 73 sq  meters      Lipase [[id number]]  (Normal) Collected:  06/20/18 0930    Lab Status:  Final result Specimen:  Blood from Arm, Right Updated:  06/20/18 1025     Lipase 107 u/L     UA w Reflex to Microscopic w Reflex to Culture [[id number]] Collected:  06/20/18 0934    Lab Status:  Final result Specimen:  Urine from Urine, Clean Catch Updated:  06/20/18 1010     Color, UA Yellow     Clarity, UA Clear     Specific Gravity, UA <=1 005     pH, UA 7 0     Leukocytes, UA Negative     Nitrite, UA Negative     Protein, UA Negative mg/dl      Glucose, UA Negative mg/dl      Ketones, UA Negative mg/dl      Urobilinogen, UA 0 2 E U /dl      Bilirubin, UA Negative     Blood, UA Negative    CBC and differential [[id number]] Collected:  06/20/18 0930    Lab Status:  Final result Specimen:  Blood from Arm, Right Updated:  06/20/18 1008     WBC 9 45 Thousand/uL      RBC 4 88 Million/uL      Hemoglobin 15 4 g/dL      Hematocrit 44 4 %      MCV 91 fL      MCH 31 6 pg      MCHC 34 7 g/dL      RDW 12 6 %      MPV 9 1 fL      Platelets 928 Thousands/uL      nRBC 0 /100 WBCs      Neutrophils Relative 75 %      Immat GRANS % 0 %      Lymphocytes Relative 17 %      Monocytes Relative 6 %      Eosinophils Relative 2 %      Basophils Relative 0 %      Neutrophils Absolute 7 08 Thousands/µL      Immature Grans Absolute 0 02 Thousand/uL      Lymphocytes Absolute 1 60 Thousands/µL      Monocytes Absolute 0 59 Thousand/µL      Eosinophils Absolute 0 14 Thousand/µL      Basophils Absolute 0 02 Thousands/µL                  CT abdomen pelvis with contrast   ED Interpretation by Marcelina Quezada DO (06/20 1113)   Abnormal   See below      Final Result by Cl Craft MD (06/20 1106)      Findings most compatible with recurrent acute diverticulitis at the junction of descending and sigmoid colon segments  No secondary complications seen  Appropriate follow-up after treatment is suggested to ensure complete resolution  There is diverticulosis elsewhere along the colon as well              Workstation performed: [id number]                    Procedures  Procedures       Phone Contacts  ED Phone Contact    ED Course                               MDM  Number of Diagnoses or Management Options  Diverticulitis: new and requires workup     Amount and/or Complexity of Data Reviewed  Clinical lab tests: reviewed and ordered  Tests in the radiology section of CPT®: reviewed and ordered  Obtain history from someone other than the patient: yes  Independent visualization of images, tracings, or specimens: yes      CritCare Time    Disposition  Final diagnoses:   Diverticulitis     Time reflects when diagnosis was documented in both MDM as applicable and the Disposition within this note     Time User Action Codes Description Comment    6/20/2018 11:22 AM Kathleen Richter Add [K57 92] Diverticulitis       ED Disposition     ED Disposition Condition Comment    Discharge  Edmund Ace discharge to home/self care      Condition at discharge: Good        Follow-up Information     Follow up With Specialties Details Why Contact Info    Vicky Luo MD Gastroenterology Schedule an appointment as soon as possible for a visit If symptoms worsen or call after you have completed antibiotics to schedule your follow up colonoscopy [address] [address]  [phone]            Discharge Medication List as of 6/20/2018 11:35 AM      START taking these medications    Details   ciprofloxacin (CIPRO) 500 mg tablet Take 1 tablet (500 mg total) by mouth 2 (two) times a day for 10 days, Starting Wed 6/20/2018, Until Sat 6/30/2018, Normal      dicyclomine (BENTYL) 20 mg tablet Take 1 tablet (20 mg total) by mouth every 6 (six) hours as needed (diarrhea/crampy abdominal pain), Starting Wed 6/20/2018, Normal      metroNIDAZOLE (FLAGYL) 500 mg tablet Take 1 tablet (500 mg total) by mouth every 8 (eight) hours for 10 days, Starting Wed 6/20/2018, Until Sat 6/30/2018, Normal      ondansetron (ZOFRAN-ODT) 4 mg disintegrating tablet Take 1 tablet (4 mg total) by mouth every 8 (eight) hours as needed for nausea or vomiting, Starting Wed 6/20/2018, Normal      traMADol (ULTRAM) 50 mg tablet Take 1 tablet (50 mg total) by mouth every 6 (six) hours as needed for moderate pain for up to 10 days, Starting Wed 6/20/2018, Until Sat 6/30/2018, Normal         CONTINUE these medications which have NOT CHANGED    Details   Albuterol Sulfate (PROAIR RESPICLICK) 812 (90 BASE) MCG/ACT AEPB Inhale, Starting 2/22/2016, Until Discontinued, Historical Med      clonazePAM (KlonoPIN) 1 mg tablet TAKE 1 TABLET BY MOUTH EVERY 8 HOURS, Normal      Fluoxetine HCl, PMDD, 20 MG CAPS Take 2 capsules (40 mg total) by mouth daily, Starting Mon 3/19/2018, Normal           No discharge procedures on file      ED Provider  Electronically Signed by           Cristobal Romero DO  06/20/18 6304

## 2018-06-22 ENCOUNTER — TELEPHONE (OUTPATIENT)
Dept: FAMILY MEDICINE CLINIC | Facility: CLINIC | Age: 48
End: 2018-06-22

## 2018-06-22 NOTE — TELEPHONE ENCOUNTER
Spoke to pt is aware to stay hydrated & take the imodium
Up to 8 imodium/day
WAS SEEN AT [facility] FOR DIVERTICULITIS  ON DOXYCYCLINE AND ALSO 2 OTHER ANTIBX  AT FIRST HE COULDN'T MAKE A BM, NOW HAS DIARRHEA AND WON'T STOP      [phone]
patient

## 2018-06-24 ENCOUNTER — APPOINTMENT (EMERGENCY)
Dept: CT IMAGING | Facility: HOSPITAL | Age: 48
End: 2018-06-24
Payer: COMMERCIAL

## 2018-06-24 ENCOUNTER — HOSPITAL ENCOUNTER (EMERGENCY)
Facility: HOSPITAL | Age: 48
Discharge: HOME | End: 2018-06-24
Attending: EMERGENCY MEDICINE
Payer: COMMERCIAL

## 2018-06-24 VITALS
OXYGEN SATURATION: 97 % | RESPIRATION RATE: 20 BRPM | WEIGHT: 169.97 LBS | DIASTOLIC BLOOD PRESSURE: 69 MMHG | TEMPERATURE: 97.1 F | BODY MASS INDEX: 25.1 KG/M2 | HEART RATE: 50 BPM | SYSTOLIC BLOOD PRESSURE: 110 MMHG

## 2018-06-24 DIAGNOSIS — K57.92 DIVERTICULITIS: Primary | ICD-10-CM

## 2018-06-24 LAB
ALBUMIN SERPL BCP-MCNC: 3.4 G/DL (ref 3.5–5)
ALP SERPL-CCNC: 46 U/L (ref 46–116)
ALT SERPL W P-5'-P-CCNC: 47 U/L (ref 12–78)
ANION GAP SERPL CALCULATED.3IONS-SCNC: 10 MMOL/L (ref 4–13)
AST SERPL W P-5'-P-CCNC: 40 U/L (ref 5–45)
BASOPHILS # BLD AUTO: 0.02 THOUSANDS/ΜL (ref 0–0.1)
BASOPHILS NFR BLD AUTO: 0 % (ref 0–1)
BILIRUB SERPL-MCNC: 0.4 MG/DL (ref 0.2–1)
BUN SERPL-MCNC: 9 MG/DL (ref 5–25)
CALCIUM SERPL-MCNC: 7.7 MG/DL (ref 8.3–10.1)
CHLORIDE SERPL-SCNC: 106 MMOL/L (ref 100–108)
CLARITY, POC: YELLOW
CO2 SERPL-SCNC: 25 MMOL/L (ref 21–32)
COLOR, POC: CLEAR
CREAT SERPL-MCNC: 0.91 MG/DL (ref 0.6–1.3)
EOSINOPHIL # BLD AUTO: 0.12 THOUSAND/ΜL (ref 0–0.61)
EOSINOPHIL NFR BLD AUTO: 2 % (ref 0–6)
ERYTHROCYTE [DISTWIDTH] IN BLOOD BY AUTOMATED COUNT: 12.5 % (ref 11.6–15.1)
EXT BILIRUBIN, UA: NEGATIVE
EXT BLOOD URINE: NEGATIVE
EXT GLUCOSE, UA: NEGATIVE
EXT KETONES: NEGATIVE
EXT NITRITE, UA: NEGATIVE
EXT PH, UA: 6
EXT PROTEIN, UA: NEGATIVE
EXT SPECIFIC GRAVITY, UA: 1.01
EXT UROBILINOGEN: 0.2
GFR SERPL CREATININE-BSD FRML MDRD: 99 ML/MIN/1.73SQ M
GLUCOSE SERPL-MCNC: 97 MG/DL (ref 65–140)
HCT VFR BLD AUTO: 39.2 % (ref 36.5–49.3)
HGB BLD-MCNC: 13.8 G/DL (ref 12–17)
IMM GRANULOCYTES # BLD AUTO: 0.01 THOUSAND/UL (ref 0–0.2)
IMM GRANULOCYTES NFR BLD AUTO: 0 % (ref 0–2)
LIPASE SERPL-CCNC: 139 U/L (ref 73–393)
LYMPHOCYTES # BLD AUTO: 1.66 THOUSANDS/ΜL (ref 0.6–4.47)
LYMPHOCYTES NFR BLD AUTO: 29 % (ref 14–44)
MCH RBC QN AUTO: 31.7 PG (ref 26.8–34.3)
MCHC RBC AUTO-ENTMCNC: 35.2 G/DL (ref 31.4–37.4)
MCV RBC AUTO: 90 FL (ref 82–98)
MONOCYTES # BLD AUTO: 0.36 THOUSAND/ΜL (ref 0.17–1.22)
MONOCYTES NFR BLD AUTO: 6 % (ref 4–12)
NEUTROPHILS # BLD AUTO: 3.6 THOUSANDS/ΜL (ref 1.85–7.62)
NEUTS SEG NFR BLD AUTO: 63 % (ref 43–75)
NRBC BLD AUTO-RTO: 0 /100 WBCS
PLATELET # BLD AUTO: 257 THOUSANDS/UL (ref 149–390)
PMV BLD AUTO: 9 FL (ref 8.9–12.7)
POTASSIUM SERPL-SCNC: 3.8 MMOL/L (ref 3.5–5.3)
PROT SERPL-MCNC: 6.5 G/DL (ref 6.4–8.2)
RBC # BLD AUTO: 4.36 MILLION/UL (ref 3.88–5.62)
SODIUM SERPL-SCNC: 141 MMOL/L (ref 136–145)
WBC # BLD AUTO: 5.77 THOUSAND/UL (ref 4.31–10.16)
WBC # BLD EST: NEGATIVE 10*3/UL

## 2018-06-24 PROCEDURE — 96374 THER/PROPH/DIAG INJ IV PUSH: CPT

## 2018-06-24 PROCEDURE — 99284 EMERGENCY DEPT VISIT MOD MDM: CPT

## 2018-06-24 PROCEDURE — 36415 COLL VENOUS BLD VENIPUNCTURE: CPT | Performed by: PHYSICIAN ASSISTANT

## 2018-06-24 PROCEDURE — 83690 ASSAY OF LIPASE: CPT | Performed by: PHYSICIAN ASSISTANT

## 2018-06-24 PROCEDURE — 96361 HYDRATE IV INFUSION ADD-ON: CPT

## 2018-06-24 PROCEDURE — 80053 COMPREHEN METABOLIC PANEL: CPT | Performed by: PHYSICIAN ASSISTANT

## 2018-06-24 PROCEDURE — 85025 COMPLETE CBC W/AUTO DIFF WBC: CPT | Performed by: PHYSICIAN ASSISTANT

## 2018-06-24 PROCEDURE — 74177 CT ABD & PELVIS W/CONTRAST: CPT

## 2018-06-24 PROCEDURE — 81002 URINALYSIS NONAUTO W/O SCOPE: CPT | Performed by: PHYSICIAN ASSISTANT

## 2018-06-24 RX ORDER — METRONIDAZOLE 500 MG/1
500 TABLET ORAL ONCE
Status: COMPLETED | OUTPATIENT
Start: 2018-06-24 | End: 2018-06-24

## 2018-06-24 RX ORDER — ONDANSETRON 2 MG/ML
4 INJECTION INTRAMUSCULAR; INTRAVENOUS ONCE
Status: COMPLETED | OUTPATIENT
Start: 2018-06-24 | End: 2018-06-24

## 2018-06-24 RX ORDER — CIPROFLOXACIN 500 MG/1
500 TABLET, FILM COATED ORAL ONCE
Status: COMPLETED | OUTPATIENT
Start: 2018-06-24 | End: 2018-06-24

## 2018-06-24 RX ADMIN — SODIUM CHLORIDE 1000 ML: 0.9 INJECTION, SOLUTION INTRAVENOUS at 14:26

## 2018-06-24 RX ADMIN — ONDANSETRON 4 MG: 2 INJECTION, SOLUTION INTRAMUSCULAR; INTRAVENOUS at 14:28

## 2018-06-24 RX ADMIN — METRONIDAZOLE 500 MG: 500 TABLET ORAL at 17:31

## 2018-06-24 RX ADMIN — IOHEXOL 100 ML: 350 INJECTION, SOLUTION INTRAVENOUS at 16:02

## 2018-06-24 RX ADMIN — CIPROFLOXACIN 500 MG: 500 TABLET, FILM COATED ORAL at 17:31

## 2018-06-24 NOTE — ED PROVIDER NOTES
History  Chief Complaint   Patient presents with    Vomiting     To ED with c/o vomiting  Pt was treated in ED for diverticulitis 6/21  States that he has been vomiting and has diarrhea  "not sure that he is keeping it down"     Patient presents to the ED with LLQ abdominal pain that is worsening over the past 4 days  Patient was seen in the ED 4 days ago and diagnosed with diverticulitis  He was started on cipro and flagyl and zofran and tramadol  Patient states he was sticking to clear liquid diet for 3 days and continues with pain and nausea and vomiting  He states he has been unable to keep anything down and zofran does not help  He continues with diarrhea and called his PCP and was told to take imodium, but it made him vomit  Patient had diverticulitis 4 years ago, but states he was not as sick as he is now  History provided by:  Patient  Vomiting   Severity:  Moderate  Duration:  4 days  Timing:  Constant  Progression:  Worsening  Chronicity:  New  Worsened by:  Liquids  Ineffective treatments:  Antiemetics  Associated symptoms: abdominal pain, chills and diarrhea    Associated symptoms: no cough, no headaches, no sore throat and no URI    Risk factors: no sick contacts, no suspect food intake and no travel to endemic areas        Prior to Admission Medications   Prescriptions Last Dose Informant Patient Reported? Taking?    Albuterol Sulfate (PROAIR RESPICLICK) 231 (90 BASE) MCG/ACT AEPB   Yes No   Sig: Inhale   Fluoxetine HCl, PMDD, 20 MG CAPS   No No   Sig: Take 2 capsules (40 mg total) by mouth daily   ciprofloxacin (CIPRO) 500 mg tablet   No No   Sig: Take 1 tablet (500 mg total) by mouth 2 (two) times a day for 10 days   clonazePAM (KlonoPIN) 1 mg tablet   No No   Sig: TAKE 1 TABLET BY MOUTH EVERY 8 HOURS   dicyclomine (BENTYL) 20 mg tablet   No No   Sig: Take 1 tablet (20 mg total) by mouth every 6 (six) hours as needed (diarrhea/crampy abdominal pain)   metroNIDAZOLE (FLAGYL) 500 mg tablet   No No   Sig: Take 1 tablet (500 mg total) by mouth every 8 (eight) hours for 10 days   ondansetron (ZOFRAN-ODT) 4 mg disintegrating tablet   No No   Sig: Take 1 tablet (4 mg total) by mouth every 8 (eight) hours as needed for nausea or vomiting   traMADol (ULTRAM) 50 mg tablet   No No   Sig: Take 1 tablet (50 mg total) by mouth every 6 (six) hours as needed for moderate pain for up to 10 days      Facility-Administered Medications: None       Past Medical History:   Diagnosis Date    Anxiety     Asthma     Depression     Hyperlipemia     RESOLVED [id number]    Medial meniscus tear     LAST ASSESSED [id number]    Psychiatric disorder        Past Surgical History:   Procedure Laterality Date    ARTHROSCOPY KNEE Right     ONSET 7/3/2014    TONSILLECTOMY AND ADENOIDECTOMY      TOOTH EXTRACTION      WISDOM TEETH       Family History   Problem Relation Age of Onset    Diabetes Mother     Hypertension Mother     Prostate cancer Father     Substance Abuse Neg Hx     Mental illness Neg Hx      I have reviewed and agree with the history as documented  Social History   Substance Use Topics    Smoking status: Current Every Day Smoker     Packs/day: 0 50     Years: 30 00     Types: Cigarettes    Smokeless tobacco: Never Used    Alcohol use No      Comment: OCCASIONAL USE PER ALLSCRIPTS         Review of Systems   Constitutional: Positive for chills  HENT: Negative for sore throat  Respiratory: Negative for cough and shortness of breath  Gastrointestinal: Positive for abdominal pain, diarrhea, nausea and vomiting  Negative for blood in stool  Musculoskeletal: Negative for back pain and neck pain  Skin: Negative for color change and rash  Neurological: Positive for dizziness and light-headedness  Negative for weakness and headaches  Psychiatric/Behavioral: Negative for confusion  All other systems reviewed and are negative        Physical Exam  Physical Exam   Constitutional: He is oriented to person, place, and time  He appears well-developed and well-nourished  He is active and cooperative  He does not appear ill  No distress  HENT:   Head: Normocephalic and atraumatic  Right Ear: Hearing normal    Left Ear: Hearing normal    Nose: Nose normal    Mouth/Throat: Oropharynx is clear and moist  Mucous membranes are pale and dry  Eyes: Conjunctivae are normal    Neck: Normal range of motion  Cardiovascular: Normal rate, regular rhythm and normal heart sounds  No murmur heard  Pulmonary/Chest: Effort normal and breath sounds normal  He has no wheezes  He has no rhonchi  He has no rales  Abdominal: Soft  Normal appearance and bowel sounds are normal  There is tenderness in the left lower quadrant  There is no rigidity, no rebound and no guarding  Musculoskeletal: Normal range of motion  He exhibits no edema  Neurological: He is alert and oriented to person, place, and time  He has normal strength  No sensory deficit  Gait normal    Skin: Skin is warm and dry  No rash noted  He is not diaphoretic  No pallor  Psychiatric: He has a normal mood and affect  Nursing note and vitals reviewed        Vital Signs  ED Triage Vitals [06/24/18 1400]   Temperature Pulse Respirations Blood Pressure SpO2   (!) 97 1 °F (36 2 °C) 75 20 112/70 99 %      Temp Source Heart Rate Source Patient Position - Orthostatic VS BP Location FiO2 (%)   Tympanic Monitor Sitting Right arm --      Pain Score       5           Vitals:    06/24/18 1515 06/24/18 1530 06/24/18 1545 06/24/18 1630   BP: 105/65 107/69 109/69 110/69   Pulse: (!) 53 (!) 51 (!) 50 (!) 50   Patient Position - Orthostatic VS:           Visual Acuity      ED Medications  Medications   sodium chloride 0 9 % bolus 1,000 mL (0 mL Intravenous Stopped 6/24/18 1640)   ondansetron (ZOFRAN) injection 4 mg (4 mg Intravenous Given 6/24/18 1428)   iohexol (OMNIPAQUE) 350 MG/ML injection (SINGLE-DOSE) 100 mL (100 mL Intravenous Given 6/24/18 1602) metroNIDAZOLE (FLAGYL) tablet 500 mg (500 mg Oral Given 6/24/18 1731)   ciprofloxacin (CIPRO) tablet 500 mg (500 mg Oral Given 6/24/18 1731)       Diagnostic Studies  Results Reviewed     Procedure Component Value Units Date/Time    POCT urinalysis dipstick [[id number]]  (Normal) Resulted:  06/24/18 1731    Lab Status:  Final result Specimen:  Urine Updated:  06/24/18 1732     Color, UA clear     Clarity, UA yellow     EXT Glucose, UA (Ref: Negative) negative     EXT Bilirubin, UA (Ref: Negative) negative     EXT Ketones, UA (Ref: Negative) negative     EXT Spec Grav, UA 1 010     EXT Blood, UA (Ref: Negative) negative     EXT pH, UA 6 0     EXT Protein, UA (Ref: Negative) negative     EXT Urobilinogen, UA (Ref: 0 2- 1 0) 0 2     EXT Leukocytes, UA (Ref: Negative) negative     EXT Nitrite, UA (Ref: Negative) negative    Comprehensive metabolic panel [[id number]]  (Abnormal) Collected:  06/24/18 1428    Lab Status:  Final result Specimen:  Blood from Arm, Left Updated:  06/24/18 1516     Sodium 141 mmol/L      Potassium 3 8 mmol/L      Chloride 106 mmol/L      CO2 25 mmol/L      Anion Gap 10 mmol/L      BUN 9 mg/dL      Creatinine 0 91 mg/dL      Glucose 97 mg/dL      Calcium 7 7 (L) mg/dL      AST 40 U/L      ALT 47 U/L      Alkaline Phosphatase 46 U/L      Total Protein 6 5 g/dL      Albumin 3 4 (L) g/dL      Total Bilirubin 0 40 mg/dL      eGFR 99 ml/min/1 73sq m     Narrative:         National Kidney Disease Education Program recommendations are as follows:  GFR calculation is accurate only with a steady state creatinine  Chronic Kidney disease less than 60 ml/min/1 73 sq  meters  Kidney failure less than 15 ml/min/1 73 sq  meters      Lipase [[id number]]  (Normal) Collected:  06/24/18 1428    Lab Status:  Final result Specimen:  Blood from Arm, Left Updated:  06/24/18 1516     Lipase 139 u/L     CBC and differential [[id number]] Collected:  06/24/18 1428    Lab Status:  Final result Specimen:  Blood from Arm, Left Updated:  06/24/18 1449     WBC 5 77 Thousand/uL      RBC 4 36 Million/uL      Hemoglobin 13 8 g/dL      Hematocrit 39 2 %      MCV 90 fL      MCH 31 7 pg      MCHC 35 2 g/dL      RDW 12 5 %      MPV 9 0 fL      Platelets 620 Thousands/uL      nRBC 0 /100 WBCs      Neutrophils Relative 63 %      Immat GRANS % 0 %      Lymphocytes Relative 29 %      Monocytes Relative 6 %      Eosinophils Relative 2 %      Basophils Relative 0 %      Neutrophils Absolute 3 60 Thousands/µL      Immature Grans Absolute 0 01 Thousand/uL      Lymphocytes Absolute 1 66 Thousands/µL      Monocytes Absolute 0 36 Thousand/µL      Eosinophils Absolute 0 12 Thousand/µL      Basophils Absolute 0 02 Thousands/µL                  CT abdomen pelvis with contrast   Final Result by Jonathan De La Rosa MD (06/24 1630)      The pericolonic infiltration seen in relation to the lower descending colon at the junction of the sigmoid colon is decreasing   No fluid collection seen   Consider colonoscopy in 6-8 weeks to exclude any luminal lesion      No worsening seen   No free air seen      The study was marked in EPIC for immediate notification  Workstation performed: [id number]                    Procedures  Procedures       Phone Contacts  ED Phone Contact    ED Course  ED Course as of Jun 24 1744   Sun Jun 24, 2018   1647 Patient given water and crackers for po challenge  1724 Patient tolerating po, will discharge  Patient instructed to continue antibiotics  MDM  Number of Diagnoses or Management Options  Diverticulitis: established and worsening  Diagnosis management comments: Patient with diverticulitis with worsening diarrhea and nausea and vomiting, will check labs and CT to r/o perforation or abscess formation  CT shows improving infection, CBC normal, patient tolerating po, will discharge home          Amount and/or Complexity of Data Reviewed  Clinical lab tests: ordered and reviewed    Patient Progress  Patient progress: stable    CritCare Time    Disposition  Final diagnoses:   Diverticulitis     Time reflects when diagnosis was documented in both MDM as applicable and the Disposition within this note     Time User Action Codes Description Comment    6/24/2018  5:22 PM Ethanmatthieu Kans Add [K57 92] Diverticulitis       ED Disposition     ED Disposition Condition Comment    Discharge  Adeola Woodard discharge to home/self care      Condition at discharge: Stable        Follow-up Information     Follow up With Specialties Details Why Dorina Ramos MD Family Medicine In 2 days For recheck [address]  [address] [address]  [address] [facility]  [phone]            Discharge Medication List as of 6/24/2018  5:23 PM      CONTINUE these medications which have NOT CHANGED    Details   Albuterol Sulfate (PROAIR RESPICLICK) 021 (90 BASE) MCG/ACT AEPB Inhale, Starting 2/22/2016, Until Discontinued, Historical Med      ciprofloxacin (CIPRO) 500 mg tablet Take 1 tablet (500 mg total) by mouth 2 (two) times a day for 10 days, Starting Wed 6/20/2018, Until Sat 6/30/2018, Normal      clonazePAM (KlonoPIN) 1 mg tablet TAKE 1 TABLET BY MOUTH EVERY 8 HOURS, Normal      dicyclomine (BENTYL) 20 mg tablet Take 1 tablet (20 mg total) by mouth every 6 (six) hours as needed (diarrhea/crampy abdominal pain), Starting Wed 6/20/2018, Normal      Fluoxetine HCl, PMDD, 20 MG CAPS Take 2 capsules (40 mg total) by mouth daily, Starting Mon 3/19/2018, Normal      metroNIDAZOLE (FLAGYL) 500 mg tablet Take 1 tablet (500 mg total) by mouth every 8 (eight) hours for 10 days, Starting Wed 6/20/2018, Until Sat 6/30/2018, Normal      ondansetron (ZOFRAN-ODT) 4 mg disintegrating tablet Take 1 tablet (4 mg total) by mouth every 8 (eight) hours as needed for nausea or vomiting, Starting Wed 6/20/2018, Normal      traMADol (ULTRAM) 50 mg tablet Take 1 tablet (50 mg total) by mouth every 6 (six) hours as needed for moderate pain for up to 10 days, Starting Wed 6/20/2018, Until Sat 6/30/2018, Normal           No discharge procedures on file      ED Provider  Electronically Signed by           Donna Black PA-C  06/24/18 0779

## 2018-06-24 NOTE — DISCHARGE INSTRUCTIONS
Slowly advance diet  Fl[place]agan foods, dry toast, crackers, plain rice, then slowly advance foods  Follow up with GI doctor for recheck this week  Diverticulitis   WHAT YOU NEED TO KNOW:   Diverticulitis is a condition that causes small pockets along your intestine called diverticula to become inflamed or infected  This is caused by hard bowel movements, food, or bacteria that get stuck in the pockets  DISCHARGE INSTRUCTIONS:   Return to the emergency department if:   · You have bowel movement or foul-smelling discharge leaking from your vagina or in your urine  · You have severe diarrhea  · You urinate less than usual or not at all  · You are not able to have a bowel movement  · You cannot stop vomiting  · You have severe abdominal pain, a fever, and your abdomen is larger than usual      · You have new or increased blood in your bowel movements  Contact your healthcare provider if:   · You have pain when you urinate  · Your symptoms get worse or do not go away  · You have questions or concerns about your condition or care  Medicines:   · Antibiotics  may be given to help treat a bacterial infection  · Prescription pain medicine  may be given  Ask your healthcare provider how to take this medicine safely  Some prescription pain medicines contain acetaminophen  Do not take other medicines that contain acetaminophen without talking to your healthcare provider  Too much acetaminophen may cause liver damage  Prescription pain medicine may cause constipation  Ask your healthcare provider how to prevent or treat constipation  · Take your medicine as directed  Contact your healthcare provider if you think your medicine is not helping or if you have side effects  Tell him or her if you are allergic to any medicine  Keep a list of the medicines, vitamins, and herbs you take  Include the amounts, and when and why you take them   Bring the list or the pill bottles to follow-up visits  Carry your medicine list with you in case of an emergency  Clear liquid diet:  A clear liquid diet includes any liquids that you can see through  Examples include water, ginger-juanito, cranberry or apple juice, frozen fruit ice, or broth  Stay on a clear liquid diet until your symptoms are gone, or as directed  Follow up with your healthcare provider as directed: You may need to return for a colonoscopy  When your symptoms are gone, you may need a low-fat, high-fiber diet to prevent diverticulitis from developing again  Your healthcare provider or dietitian can help you create meal plans  Write down your questions so you remember to ask them during your visits  © 2017 [facility] Information is for End User's use only and may not be sold, redistributed or otherwise used for commercial purposes  All illustrations and images included in CareNotes® are the copyrighted property of [employer]  or [employer]  The above information is an  only  It is not intended as medical advice for individual conditions or treatments  Talk to your doctor, nurse or pharmacist before following any medical regimen to see if it is safe and effective for you

## 2018-09-04 DIAGNOSIS — F41.9 ANXIETY: ICD-10-CM

## 2018-09-04 RX ORDER — CLONAZEPAM 1 MG/1
TABLET ORAL
Qty: 90 TABLET | Refills: 0 | Status: SHIPPED | OUTPATIENT
Start: 2018-09-04 | End: 2018-09-19 | Stop reason: SDUPTHER

## 2018-09-19 ENCOUNTER — OFFICE VISIT (OUTPATIENT)
Dept: FAMILY MEDICINE CLINIC | Facility: CLINIC | Age: 48
End: 2018-09-19
Payer: COMMERCIAL

## 2018-09-19 VITALS
SYSTOLIC BLOOD PRESSURE: 100 MMHG | BODY MASS INDEX: 25.33 KG/M2 | RESPIRATION RATE: 14 BRPM | OXYGEN SATURATION: 98 % | WEIGHT: 171 LBS | HEART RATE: 78 BPM | HEIGHT: 69 IN | DIASTOLIC BLOOD PRESSURE: 60 MMHG

## 2018-09-19 DIAGNOSIS — F32.1 MODERATE SINGLE CURRENT EPISODE OF MAJOR DEPRESSIVE DISORDER (HCC): ICD-10-CM

## 2018-09-19 DIAGNOSIS — F41.9 ANXIETY: ICD-10-CM

## 2018-09-19 DIAGNOSIS — F41.1 GENERALIZED ANXIETY DISORDER: ICD-10-CM

## 2018-09-19 DIAGNOSIS — F17.210 CIGARETTE NICOTINE DEPENDENCE WITHOUT COMPLICATION: ICD-10-CM

## 2018-09-19 DIAGNOSIS — J45.20 MILD INTERMITTENT ASTHMA WITHOUT COMPLICATION: Primary | ICD-10-CM

## 2018-09-19 DIAGNOSIS — F32.1 CURRENT MODERATE EPISODE OF MAJOR DEPRESSIVE DISORDER WITHOUT PRIOR EPISODE (HCC): ICD-10-CM

## 2018-09-19 DIAGNOSIS — L72.3 SEBACEOUS CYST OF RIGHT AXILLA: ICD-10-CM

## 2018-09-19 PROCEDURE — 3008F BODY MASS INDEX DOCD: CPT | Performed by: FAMILY MEDICINE

## 2018-09-19 PROCEDURE — 99214 OFFICE O/P EST MOD 30 MIN: CPT | Performed by: FAMILY MEDICINE

## 2018-09-19 RX ORDER — FLUOXETINE HYDROCHLORIDE 40 MG/1
40 CAPSULE ORAL DAILY
Qty: 30 CAPSULE | Refills: 5 | Status: SHIPPED | OUTPATIENT
Start: 2018-09-19 | End: 2019-03-11 | Stop reason: SDUPTHER

## 2018-09-19 RX ORDER — CLONAZEPAM 1 MG/1
1 TABLET ORAL EVERY 8 HOURS
Qty: 90 TABLET | Refills: 5 | Status: ON HOLD | OUTPATIENT
Start: 2018-09-19 | End: 2018-11-26

## 2018-09-19 RX ORDER — DOXYCYCLINE HYCLATE 100 MG/1
100 CAPSULE ORAL EVERY 12 HOURS SCHEDULED
Qty: 20 CAPSULE | Refills: 1 | Status: SHIPPED | OUTPATIENT
Start: 2018-09-19 | End: 2018-09-29

## 2018-09-19 NOTE — PROGRESS NOTES
[id number]         NAME: Marysol Maddox is a 51 y o  male  : 1970    MRN: [id number]  DATE: 2018  TIME: 8:55 AM    Assessment and Plan   Mild intermittent asthma without complication [P76 84]  1  Mild intermittent asthma without complication  Albuterol Sulfate (PROAIR RESPICLICK) 754 (90 Base) MCG/ACT AEPB   2  Current moderate episode of major depressive disorder without prior episode ([address] )     3  Generalized anxiety disorder     4  Cigarette nicotine dependence without complication     5  Sebaceous cyst of right axilla  doxycycline hyclate (VIBRAMYCIN) 100 mg capsule   6  Moderate single current episode of major depressive disorder (HCC)  FLUoxetine (PROzac) 40 MG capsule   7  Anxiety  clonazePAM (KlonoPIN) 1 mg tablet    FLUoxetine (PROzac) 40 MG capsule       No problem-specific Assessment & Plan notes found for this encounter  Patient Instructions     Patient Instructions     Renew meds for anxiety and depression  Switch to 40 mg Prozac 1 tablet daily  Doxycycline for inflamed cyst under the  Right axilla  Refill of it is  after 10 days  You declines flu shots  Continue as needed ProAir for asthma  Recheck 6 months  Chief Complaint     Chief Complaint   Patient presents with    Follow-up     med check          History of Present Illness       Patient here for 6 month check on depression anxiety  Things are going well  Daily fluoxetine and and Klonopin twice daily  Asthma good as needed albuterol -generally less than twice weekly  Cigarette smoker- has tapered to less than half a pack per day  Continues to work on this  Right axilla - tender cutaneous cyst   Has been present for about 1 month  Review of Systems   Review of Systems   Constitutional: Negative for chills, diaphoresis and fatigue  HENT: Negative for congestion, rhinorrhea, sinus pain and sore throat      Respiratory: Negative for cough, chest tightness, shortness of breath and wheezing  Cardiovascular: Negative for chest pain, palpitations and leg swelling  Skin: Negative for rash and wound  Psychiatric/Behavioral: Negative for decreased concentration, dysphoric mood and sleep disturbance  The patient is not nervous/anxious  Current Medications       Current Outpatient Prescriptions:     Albuterol Sulfate (PROAIR RESPICLICK) 386 (90 Base) MCG/ACT AEPB, Inhale 2 actuation every 4 (four) hours as needed (wheezing), Disp: 1 each, Rfl: 2    clonazePAM (KlonoPIN) 1 mg tablet, Take 1 tablet (1 mg total) by mouth every 8 (eight) hours, Disp: 90 tablet, Rfl: 5    doxycycline hyclate (VIBRAMYCIN) 100 mg capsule, Take 1 capsule (100 mg total) by mouth every 12 (twelve) hours for 10 days, Disp: 20 capsule, Rfl: 1    FLUoxetine (PROzac) 40 MG capsule, Take 1 capsule (40 mg total) by mouth daily, Disp: 30 capsule, Rfl: 5    Current Allergies     Allergies as of 09/19/2018 - Reviewed 09/19/2018   Allergen Reaction Noted    Erythromycin Diarrhea 12/02/2016            The following portions of the patient's history were reviewed and updated as appropriate: allergies, current medications, past family history, past medical history, past social history, past surgical history and problem list      Past Medical History:   Diagnosis Date    Anxiety     Asthma     Depression     Hyperlipemia     RESOLVED [id number]    Medial meniscus tear     LAST ASSESSED [id number]    Psychiatric disorder        Past Surgical History:   Procedure Laterality Date    ARTHROSCOPY KNEE Right     ONSET 7/3/2014    TONSILLECTOMY AND ADENOIDECTOMY      TOOTH EXTRACTION      WISDOM TEETH       Family History   Problem Relation Age of Onset    Diabetes Mother     Hypertension Mother     Prostate cancer Father     Substance Abuse Neg Hx     Mental illness Neg Hx          Medications have been verified          Objective   /60 (BP Location: Right arm, Patient Position: Sitting, Cuff Size: Standard)   Pulse 78   Resp 14   Ht 5' 9" (1 753 m)   Wt 77 6 kg (171 lb)   SpO2 98%   BMI 25 25 kg/m²        Physical Exam     Physical Exam   Constitutional: He is oriented to person, place, and time  He appears well-developed and well-nourished  No distress  HENT:   Head: Normocephalic and atraumatic  Right Ear: Tympanic membrane and external ear normal  No drainage  Left Ear: Tympanic membrane normal  There is drainage  Mouth/Throat: No oropharyngeal exudate  Eyes: Conjunctivae and EOM are normal  Right eye exhibits no discharge  Left eye exhibits no discharge  Neck: Normal range of motion  Neck supple  No thyromegaly present  Cardiovascular: Normal rate, regular rhythm and normal heart sounds  Pulmonary/Chest: Effort normal  No respiratory distress  He has no wheezes  He has no rales  Lymphadenopathy:     He has no cervical adenopathy  Neurological: He is alert and oriented to person, place, and time  Skin:   Right axilla - 1 cm tender nodule within the skin, minimal erythema  Psychiatric: He has a normal mood and affect   His behavior is normal

## 2018-09-19 NOTE — PATIENT INSTRUCTIONS
Renew meds for anxiety and depression  Switch to 40 mg Prozac 1 tablet daily  Doxycycline for inflamed cyst under the  Right axilla  Refill of it is  after 10 days  You declines flu shots  Continue as needed ProAir for asthma  Recheck 6 months

## 2018-11-12 ENCOUNTER — OFFICE VISIT (OUTPATIENT)
Dept: FAMILY MEDICINE CLINIC | Facility: CLINIC | Age: 48
End: 2018-11-12
Payer: COMMERCIAL

## 2018-11-12 ENCOUNTER — TELEPHONE (OUTPATIENT)
Dept: FAMILY MEDICINE CLINIC | Facility: CLINIC | Age: 48
End: 2018-11-12

## 2018-11-12 VITALS
HEIGHT: 69 IN | DIASTOLIC BLOOD PRESSURE: 72 MMHG | OXYGEN SATURATION: 97 % | BODY MASS INDEX: 25.48 KG/M2 | SYSTOLIC BLOOD PRESSURE: 118 MMHG | HEART RATE: 101 BPM | WEIGHT: 172 LBS

## 2018-11-12 DIAGNOSIS — F41.1 GENERALIZED ANXIETY DISORDER: ICD-10-CM

## 2018-11-12 DIAGNOSIS — F32.1 CURRENT MODERATE EPISODE OF MAJOR DEPRESSIVE DISORDER WITHOUT PRIOR EPISODE (HCC): ICD-10-CM

## 2018-11-12 DIAGNOSIS — K57.92 DIVERTICULITIS: Primary | ICD-10-CM

## 2018-11-12 PROCEDURE — 3008F BODY MASS INDEX DOCD: CPT | Performed by: FAMILY MEDICINE

## 2018-11-12 PROCEDURE — 99214 OFFICE O/P EST MOD 30 MIN: CPT | Performed by: FAMILY MEDICINE

## 2018-11-12 RX ORDER — METRONIDAZOLE 500 MG/1
500 TABLET ORAL EVERY 8 HOURS SCHEDULED
Qty: 30 TABLET | Refills: 0 | Status: SHIPPED | OUTPATIENT
Start: 2018-11-12 | End: 2018-11-26 | Stop reason: HOSPADM

## 2018-11-12 RX ORDER — CIPROFLOXACIN 500 MG/1
500 TABLET, FILM COATED ORAL EVERY 12 HOURS SCHEDULED
Qty: 20 TABLET | Refills: 0 | Status: SHIPPED | OUTPATIENT
Start: 2018-11-12 | End: 2018-11-26 | Stop reason: HOSPADM

## 2018-11-12 RX ORDER — TRAZODONE HYDROCHLORIDE 50 MG/1
50 TABLET ORAL
Qty: 30 TABLET | Refills: 3 | Status: SHIPPED | OUTPATIENT
Start: 2018-11-12 | End: 2018-11-26 | Stop reason: HOSPADM

## 2018-11-12 NOTE — PATIENT INSTRUCTIONS
Start Flagyl and Cipro for 10 days  If you appear to bedding worse, such as very high fevers, increasing abdominal pain, repeated vomiting, or generally feeling worse you should contact us or go to the emergency room  If you are not improved but not definitively worse in 2-3 days, you should get CBC and CMP at the lab  Depression-add low-dose trazodone at bedtime to help sleep and further improve depressive symptoms

## 2018-11-12 NOTE — PROGRESS NOTES
[address]        NAME: Artist Joel is a 51 y o  male  : 1970    MRN: [id number]  DATE: 2018  TIME: 3:50 PM    Assessment and Plan   Diverticulitis [K57 92]  1  Diverticulitis  metroNIDAZOLE (FLAGYL) 500 mg tablet    ciprofloxacin (CIPRO) 500 mg tablet    CBC and differential    Comprehensive metabolic panel    CBC and differential    Comprehensive metabolic panel   2  Current moderate episode of major depressive disorder without prior episode (HCC)  traZODone (DESYREL) 50 mg tablet   3  Generalized anxiety disorder         No problem-specific Assessment & Plan notes found for this encounter  Patient Instructions     Patient Instructions   Start Flagyl and Cipro for 10 days  If you appear to bedding worse, such as very high fevers, increasing abdominal pain, repeated vomiting, or generally feeling worse you should contact us or go to the emergency room  If you are not improved but not definitively worse in 2-3 days, you should get CBC and CMP at the lab  Depression-add low-dose trazodone at bedtime to help sleep and further improve depressive symptoms  Chief Complaint     Chief Complaint   Patient presents with    Diverticulitis     for 3 days--nausea, bloating, diarrhea         History of Present Illness       Patient has had increasing left lower quadrant pain over the last 2 days  Known previous diverticular disease with attack of diverticulitis several months ago  Had had discussed with GI who advise she wait till age 47 to have a colonoscopy  Low-grade fever  No rectal bleeding noted  Depression-PHQ-9 score is 13  Also anxiety which she takes Klonopin twice daily  Review of Systems   Review of Systems   Constitutional: Positive for fever  Negative for appetite change, chills, diaphoresis and fatigue  Respiratory: Negative for cough, choking and shortness of breath      Cardiovascular: Negative for chest pain and palpitations  Gastrointestinal: Positive for abdominal pain, nausea and rectal pain  Negative for abdominal distention, blood in stool, constipation, diarrhea and vomiting  Genitourinary: Negative for dysuria, flank pain and frequency  Musculoskeletal: Negative for back pain and myalgias  Neurological: Negative for dizziness, syncope and light-headedness  Psychiatric/Behavioral: Positive for decreased concentration, dysphoric mood and sleep disturbance  Negative for suicidal ideas  The patient is nervous/anxious            Current Medications       Current Outpatient Prescriptions:     Albuterol Sulfate (PROAIR RESPICLICK) 534 (90 Base) MCG/ACT AEPB, Inhale 2 actuation every 4 (four) hours as needed (wheezing), Disp: 1 each, Rfl: 2    clonazePAM (KlonoPIN) 1 mg tablet, Take 1 tablet (1 mg total) by mouth every 8 (eight) hours, Disp: 90 tablet, Rfl: 5    FLUoxetine (PROzac) 40 MG capsule, Take 1 capsule (40 mg total) by mouth daily, Disp: 30 capsule, Rfl: 5    ciprofloxacin (CIPRO) 500 mg tablet, Take 1 tablet (500 mg total) by mouth every 12 (twelve) hours for 10 days, Disp: 20 tablet, Rfl: 0    metroNIDAZOLE (FLAGYL) 500 mg tablet, Take 1 tablet (500 mg total) by mouth every 8 (eight) hours for 10 days, Disp: 30 tablet, Rfl: 0    traZODone (DESYREL) 50 mg tablet, Take 1 tablet (50 mg total) by mouth daily at bedtime, Disp: 30 tablet, Rfl: 3    Current Allergies     Allergies as of 11/12/2018 - Reviewed 11/12/2018   Allergen Reaction Noted    Erythromycin Diarrhea 12/02/2016            The following portions of the patient's history were reviewed and updated as appropriate: allergies, current medications, past family history, past medical history, past social history, past surgical history and problem list      Past Medical History:   Diagnosis Date    Anxiety     Asthma     Depression     Hyperlipemia     RESOLVED [id number]    Medial meniscus tear     LAST ASSESSED [id number]    Psychiatric disorder        Past Surgical History:   Procedure Laterality Date    ARTHROSCOPY KNEE Right     ONSET 7/3/2014    TONSILLECTOMY AND ADENOIDECTOMY      TOOTH EXTRACTION      WISDOM TEETH       Family History   Problem Relation Age of Onset    Diabetes Mother     Hypertension Mother     Prostate cancer Father     Substance Abuse Neg Hx     Mental illness Neg Hx          Medications have been verified  Objective   /72   Pulse 101   Ht 5' 9" (1 753 m)   Wt 78 kg (172 lb)   SpO2 97%   BMI 25 40 kg/m²        Physical Exam     Physical Exam   Constitutional: He is oriented to person, place, and time  He appears well-developed and well-nourished  No distress  HENT:   Head: Normocephalic and atraumatic  Mouth/Throat: Oropharynx is clear and moist    Neck: Neck supple  No thyromegaly present  Cardiovascular: Normal rate, regular rhythm and normal heart sounds  Pulmonary/Chest: Effort normal and breath sounds normal  No respiratory distress  He has no wheezes  Abdominal: Soft  Bowel sounds are normal  He exhibits no distension and no mass  There is no hepatosplenomegaly  There is tenderness in the left lower quadrant  There is no rebound, no guarding and no CVA tenderness  No hernia  Musculoskeletal:        Right shoulder: He exhibits no tenderness  Lymphadenopathy:     He has no cervical adenopathy  Neurological: He is alert and oriented to person, place, and time  Skin: Skin is warm and dry  He is not diaphoretic

## 2018-11-14 ENCOUNTER — HOSPITAL ENCOUNTER (EMERGENCY)
Facility: HOSPITAL | Age: 48
Discharge: HOME | End: 2018-11-14
Attending: EMERGENCY MEDICINE
Payer: COMMERCIAL

## 2018-11-14 ENCOUNTER — TELEPHONE (OUTPATIENT)
Dept: FAMILY MEDICINE CLINIC | Facility: CLINIC | Age: 48
End: 2018-11-14

## 2018-11-14 ENCOUNTER — APPOINTMENT (EMERGENCY)
Dept: CT IMAGING | Facility: HOSPITAL | Age: 48
End: 2018-11-14
Payer: COMMERCIAL

## 2018-11-14 VITALS
TEMPERATURE: 96.1 F | HEIGHT: 69 IN | SYSTOLIC BLOOD PRESSURE: 115 MMHG | WEIGHT: 171 LBS | DIASTOLIC BLOOD PRESSURE: 72 MMHG | BODY MASS INDEX: 25.33 KG/M2 | HEART RATE: 70 BPM | RESPIRATION RATE: 20 BRPM | OXYGEN SATURATION: 98 %

## 2018-11-14 DIAGNOSIS — K57.32 SIGMOID DIVERTICULITIS: Primary | ICD-10-CM

## 2018-11-14 LAB
ALBUMIN SERPL BCP-MCNC: 3.8 G/DL (ref 3.5–5)
ALP SERPL-CCNC: 60 U/L (ref 46–116)
ALT SERPL W P-5'-P-CCNC: 30 U/L (ref 12–78)
ANION GAP SERPL CALCULATED.3IONS-SCNC: 8 MMOL/L (ref 4–13)
AST SERPL W P-5'-P-CCNC: 24 U/L (ref 5–45)
BASOPHILS # BLD AUTO: 0.01 THOUSANDS/ΜL (ref 0–0.1)
BASOPHILS NFR BLD AUTO: 0 % (ref 0–1)
BILIRUB SERPL-MCNC: 0.8 MG/DL (ref 0.2–1)
BILIRUB UR QL STRIP: NEGATIVE
BUN SERPL-MCNC: 8 MG/DL (ref 5–25)
CALCIUM SERPL-MCNC: 9.1 MG/DL (ref 8.3–10.1)
CHLORIDE SERPL-SCNC: 103 MMOL/L (ref 100–108)
CLARITY UR: CLEAR
CO2 SERPL-SCNC: 28 MMOL/L (ref 21–32)
COLOR UR: YELLOW
CREAT SERPL-MCNC: 0.92 MG/DL (ref 0.6–1.3)
EOSINOPHIL # BLD AUTO: 0.2 THOUSAND/ΜL (ref 0–0.61)
EOSINOPHIL NFR BLD AUTO: 3 % (ref 0–6)
ERYTHROCYTE [DISTWIDTH] IN BLOOD BY AUTOMATED COUNT: 13.4 % (ref 11.6–15.1)
GFR SERPL CREATININE-BSD FRML MDRD: 97 ML/MIN/1.73SQ M
GLUCOSE SERPL-MCNC: 131 MG/DL (ref 65–140)
GLUCOSE UR STRIP-MCNC: NEGATIVE MG/DL
HCT VFR BLD AUTO: 43.6 % (ref 36.5–49.3)
HGB BLD-MCNC: 14.9 G/DL (ref 12–17)
HGB UR QL STRIP.AUTO: NEGATIVE
IMM GRANULOCYTES # BLD AUTO: 0 THOUSAND/UL (ref 0–0.2)
IMM GRANULOCYTES NFR BLD AUTO: 0 % (ref 0–2)
KETONES UR STRIP-MCNC: NEGATIVE MG/DL
LEUKOCYTE ESTERASE UR QL STRIP: NEGATIVE
LYMPHOCYTES # BLD AUTO: 1.48 THOUSANDS/ΜL (ref 0.6–4.47)
LYMPHOCYTES NFR BLD AUTO: 24 % (ref 14–44)
MCH RBC QN AUTO: 31.8 PG (ref 26.8–34.3)
MCHC RBC AUTO-ENTMCNC: 34.2 G/DL (ref 31.4–37.4)
MCV RBC AUTO: 93 FL (ref 82–98)
MONOCYTES # BLD AUTO: 0.65 THOUSAND/ΜL (ref 0.17–1.22)
MONOCYTES NFR BLD AUTO: 11 % (ref 4–12)
NEUTROPHILS # BLD AUTO: 3.82 THOUSANDS/ΜL (ref 1.85–7.62)
NEUTS SEG NFR BLD AUTO: 62 % (ref 43–75)
NITRITE UR QL STRIP: NEGATIVE
PH UR STRIP.AUTO: 7 [PH] (ref 4.5–8)
PLATELET # BLD AUTO: 299 THOUSANDS/UL (ref 149–390)
PMV BLD AUTO: 9.1 FL (ref 8.9–12.7)
POTASSIUM SERPL-SCNC: 4 MMOL/L (ref 3.5–5.3)
PROT SERPL-MCNC: 7.6 G/DL (ref 6.4–8.2)
PROT UR STRIP-MCNC: NEGATIVE MG/DL
RBC # BLD AUTO: 4.68 MILLION/UL (ref 3.88–5.62)
SODIUM SERPL-SCNC: 139 MMOL/L (ref 136–145)
SP GR UR STRIP.AUTO: <=1.005 (ref 1–1.03)
UROBILINOGEN UR QL STRIP.AUTO: 0.2 E.U./DL
WBC # BLD AUTO: 6.16 THOUSAND/UL (ref 4.31–10.16)

## 2018-11-14 PROCEDURE — 74177 CT ABD & PELVIS W/CONTRAST: CPT

## 2018-11-14 PROCEDURE — 96375 TX/PRO/DX INJ NEW DRUG ADDON: CPT

## 2018-11-14 PROCEDURE — 80053 COMPREHEN METABOLIC PANEL: CPT | Performed by: EMERGENCY MEDICINE

## 2018-11-14 PROCEDURE — 99284 EMERGENCY DEPT VISIT MOD MDM: CPT

## 2018-11-14 PROCEDURE — 96361 HYDRATE IV INFUSION ADD-ON: CPT

## 2018-11-14 PROCEDURE — 36415 COLL VENOUS BLD VENIPUNCTURE: CPT | Performed by: EMERGENCY MEDICINE

## 2018-11-14 PROCEDURE — 96374 THER/PROPH/DIAG INJ IV PUSH: CPT

## 2018-11-14 PROCEDURE — 85025 COMPLETE CBC W/AUTO DIFF WBC: CPT | Performed by: EMERGENCY MEDICINE

## 2018-11-14 PROCEDURE — 81003 URINALYSIS AUTO W/O SCOPE: CPT | Performed by: EMERGENCY MEDICINE

## 2018-11-14 RX ORDER — KETOROLAC TROMETHAMINE 30 MG/ML
15 INJECTION, SOLUTION INTRAMUSCULAR; INTRAVENOUS ONCE
Status: COMPLETED | OUTPATIENT
Start: 2018-11-14 | End: 2018-11-14

## 2018-11-14 RX ORDER — OXYCODONE HYDROCHLORIDE 5 MG/1
5 TABLET ORAL EVERY 4 HOURS PRN
Qty: 10 TABLET | Refills: 0 | Status: SHIPPED | OUTPATIENT
Start: 2018-11-14 | End: 2018-11-17

## 2018-11-14 RX ORDER — HYDROMORPHONE HCL/PF 1 MG/ML
0.5 SYRINGE (ML) INJECTION ONCE
Status: COMPLETED | OUTPATIENT
Start: 2018-11-14 | End: 2018-11-14

## 2018-11-14 RX ADMIN — KETOROLAC TROMETHAMINE 15 MG: 30 INJECTION, SOLUTION INTRAMUSCULAR; INTRAVENOUS at 09:03

## 2018-11-14 RX ADMIN — SODIUM CHLORIDE 1000 ML: 0.9 INJECTION, SOLUTION INTRAVENOUS at 09:01

## 2018-11-14 RX ADMIN — HYDROMORPHONE HYDROCHLORIDE 0.5 MG: 1 INJECTION, SOLUTION INTRAMUSCULAR; INTRAVENOUS; SUBCUTANEOUS at 11:01

## 2018-11-14 RX ADMIN — IOHEXOL 100 ML: 350 INJECTION, SOLUTION INTRAVENOUS at 10:02

## 2018-11-14 NOTE — TELEPHONE ENCOUNTER
pc pt's wife- states that they are at [address] right now and pt is having his cbc and cmp drawn, however his pain is getting worse (wife thinks pain is 8/10)  She states that he has a "low grade fever", when I asked what his temperature was she said she "thinks its 99 7"  She also mentioned that he had some "nocturnal bed-wetting last night" and that his pain radiates from his left side to his belly button  Pt's wife is asking if we can just order a CT Scan for her   I placed the pt's wife on hold and discussed this call with Dr Rosita Corrigan and he advised me that with these new findings (all new sxs since his ov here) that pt should go to ED to be evaluated)    Advised wife of same      Task to PM for [facility]

## 2018-11-14 NOTE — DISCHARGE INSTRUCTIONS
Diverticulitis   WHAT YOU NEED TO KNOW:   Diverticulitis is a condition that causes small pockets along your intestine called diverticula to become inflamed or infected  This is caused by hard bowel movements, food, or bacteria that get stuck in the pockets  DISCHARGE INSTRUCTIONS:   Return to the emergency department if:   · You have bowel movement or foul-smelling discharge leaking from your vagina or in your urine  · You have severe diarrhea  · You urinate less than usual or not at all  · You are not able to have a bowel movement  · You cannot stop vomiting  · You have severe abdominal pain, a fever, and your abdomen is larger than usual      · You have new or increased blood in your bowel movements  Contact your healthcare provider if:   · You have pain when you urinate  · Your symptoms get worse or do not go away  · You have questions or concerns about your condition or care  Medicines:   · Antibiotics  may be given to help treat a bacterial infection  · Prescription pain medicine  may be given  Ask your healthcare provider how to take this medicine safely  Some prescription pain medicines contain acetaminophen  Do not take other medicines that contain acetaminophen without talking to your healthcare provider  Too much acetaminophen may cause liver damage  Prescription pain medicine may cause constipation  Ask your healthcare provider how to prevent or treat constipation  · Take your medicine as directed  Contact your healthcare provider if you think your medicine is not helping or if you have side effects  Tell him or her if you are allergic to any medicine  Keep a list of the medicines, vitamins, and herbs you take  Include the amounts, and when and why you take them  Bring the list or the pill bottles to follow-up visits  Carry your medicine list with you in case of an emergency  Clear liquid diet:  A clear liquid diet includes any liquids that you can see through  Examples include water, ginger-juanito, cranberry or apple juice, frozen fruit ice, or broth  Stay on a clear liquid diet until your symptoms are gone, or as directed  Follow up with your healthcare provider as directed: You may need to return for a colonoscopy  When your symptoms are gone, you may need a low-fat, high-fiber diet to prevent diverticulitis from developing again  Your healthcare provider or dietitian can help you create meal plans  Write down your questions so you remember to ask them during your visits  © 2017 [address] [facility] Information is for End User's use only and may not be sold, redistributed or otherwise used for commercial purposes  All illustrations and images included in CareNotes® are the copyrighted property of A D [employer] M [employer] Inc  or [employer]  The above information is an  only  It is not intended as medical advice for individual conditions or treatments  Talk to your doctor, nurse or pharmacist before following any medical regimen to see if it is safe and effective for you  Diverticulitis Diet   WHAT YOU NEED TO KNOW:   A diverticulitis diet includes foods that allow your intestines to rest while you have diverticulitis  Diverticulitis is a condition that causes small pockets along your intestine called diverticula to become inflamed or infected  This is caused by hard bowel movement, food, or bacteria that get stuck in the pockets  DISCHARGE INSTRUCTIONS:   Foods you can eat while you have diverticulitis:   · A clear liquid diet may be recommended for 2 to 3 days  A clear liquid diet is made up of clear liquids and foods that are liquid at room temperature  Your healthcare provider will tell you when you can start eating solid foods   Examples of clear liquids include the following:     ¨ Water and clear juices (such as apple, cranberry, or grape), strained citrus juices or fruit punch    ¨ Coffee or tea (without cream or milk)    ¨ Clear sports drinks or soft drinks, such as ginger ale, lemon-lime soda, or club soda (no cola or root beer)    ¨ Clear broth, bouillon, or consommé    ¨ Plain popsicles (no popsicles with pureed fruit or fiber)    ¨ Flavored gelatin without fruit    · A low-fiber diet may be recommended until your symptoms improve  Your healthcare provider will tell you when you can slowly add high-fiber foods back into your diet  ¨ Cream of wheat and finely ground grits    ¨ White bread, white pasta, and white rice    ¨ Canned and well-cooked fruit without skins or seeds, and juice without pulp    ¨ Canned and well-cooked vegetables without skins or seeds, and vegetable juice    ¨ Cow's milk, lactose-free milk, soy milk, and rice milk    ¨ Yogurt, cottage cheese, and sherbet    ¨ Eggs, poultry (such as chicken and turkey), fish, and tender, ground, well-cooked beef     ¨ Tofu and smooth nut butters, such as peanut butter    ¨ Broth and strained soups made of low-fiber foods  Foods you should avoid while you have diverticulitis:  Avoid foods that are high in fiber while you have symptoms of diverticulitis  Examples of high-fiber foods include the following:  · Whole grains and breads, and cereals made with whole grains    · Dried fruit, fresh fruit with skin, and fruit pulp    · Raw vegetables    · Cooked greens, such as spinach    · Tough meat and meat with gristle    · Legumes, such as trinh beans and lentils  Contact your healthcare provider if:   · Your symptoms do not get better, or they get worse  · You have questions about the foods you should eat  · You have questions or concerns about your condition or care  © 2017 [address] [facility] Information is for End User's use only and may not be sold, redistributed or otherwise used for commercial purposes  All illustrations and images included in CareNotes® are the copyrighted property of A D A M [employer] Inc  or [employer]    The above information is an  only  It is not intended as medical advice for individual conditions or treatments  Talk to your doctor, nurse or pharmacist before following any medical regimen to see if it is safe and effective for you

## 2018-11-14 NOTE — ED NOTES
Patient complains of LLQ pain that radiates across his lower abd    Patient denies nay N/V?D     Mee Deluca RN  11/14/18 [id number]

## 2018-11-14 NOTE — ED PROVIDER NOTES
History  Chief Complaint   Patient presents with    Abdominal Pain     Patient states he started over the weekend with LLQ pain that radiates across his abd  Patietnt had diarrhea on Sat and  now feels constipated  Patient states he did see his PCP on Monday and was given trazqadone for anxiety, cipro an dflagyl  Patient states he is not getting any btter and was incontinent of urine last night     51 yom with llq pain  Seen at pcp  dxed with diverticulitis 2 days ago  Started on cipro flagyl  No f/c, blood In stool  N/v/d  No dysuria  Exam reveals tenderness LLQ  A/P: abd pain, likely diverticultis, ct ro r/o abscess  History provided by:  Patient  Abdominal Pain   Pain location:  LLQ  Pain quality: aching    Pain radiates to:  Suprapubic region  Pain severity:  Mild  Onset quality:  Gradual  Duration:  4 days  Timing:  Intermittent  Progression:  Worsening  Chronicity:  Recurrent  Worsened by:  Nothing  Ineffective treatments:  None tried  Associated symptoms: no chest pain, no chills, no cough, no diarrhea, no dysuria, no fatigue, no fever, no nausea, no shortness of breath and no vomiting        Prior to Admission Medications   Prescriptions Last Dose Informant Patient Reported? Taking?    FLUoxetine (PROzac) 40 MG capsule   No Yes   Sig: Take 1 capsule (40 mg total) by mouth daily   ciprofloxacin (CIPRO) 500 mg tablet   No Yes   Sig: Take 1 tablet (500 mg total) by mouth every 12 (twelve) hours for 10 days   clonazePAM (KlonoPIN) 1 mg tablet   No Yes   Sig: Take 1 tablet (1 mg total) by mouth every 8 (eight) hours   metroNIDAZOLE (FLAGYL) 500 mg tablet   No Yes   Sig: Take 1 tablet (500 mg total) by mouth every 8 (eight) hours for 10 days   traZODone (DESYREL) 50 mg tablet   No Yes   Sig: Take 1 tablet (50 mg total) by mouth daily at bedtime      Facility-Administered Medications: None       Past Medical History:   Diagnosis Date    Anxiety     Asthma     Depression     Hyperlipemia RESOLVED [id number]    Medial meniscus tear     LAST ASSESSED [id number]    Psychiatric disorder        Past Surgical History:   Procedure Laterality Date    ARTHROSCOPY KNEE Right     ONSET 7/3/2014    TONSILLECTOMY AND ADENOIDECTOMY      TOOTH EXTRACTION      WISDOM TEETH       Family History   Problem Relation Age of Onset    Diabetes Mother     Hypertension Mother     Prostate cancer Father     Substance Abuse Neg Hx     Mental illness Neg Hx      I have reviewed and agree with the history as documented  Social History   Substance Use Topics    Smoking status: Current Every Day Smoker     Packs/day: 1 00     Years: 30 00     Types: Cigarettes    Smokeless tobacco: Never Used    Alcohol use No      Comment: OCCASIONAL USE PER ALLSCRIPTS         Review of Systems   Constitutional: Negative for chills, fatigue and fever  Eyes: Negative for photophobia and visual disturbance  Respiratory: Negative for cough and shortness of breath  Cardiovascular: Negative for chest pain, palpitations and leg swelling  Gastrointestinal: Positive for abdominal pain  Negative for diarrhea, nausea and vomiting  Endocrine: Negative for polydipsia and polyuria  Genitourinary: Negative for decreased urine volume, difficulty urinating, dysuria and frequency  Musculoskeletal: Negative for back pain, neck pain and neck stiffness  Skin: Negative for color change and rash  Allergic/Immunologic: Negative for environmental allergies and immunocompromised state  Neurological: Negative for dizziness and headaches  Hematological: Negative for adenopathy  Does not bruise/bleed easily  Psychiatric/Behavioral: Negative for dysphoric mood  The patient is not nervous/anxious  Physical Exam  Physical Exam   Constitutional: He is oriented to person, place, and time  He appears well-developed  HENT:   Head: Normocephalic and atraumatic     Right Ear: External ear normal    Left Ear: External ear normal  Mouth/Throat: Oropharynx is clear and moist    Eyes: Pupils are equal, round, and reactive to light  Conjunctivae and EOM are normal    Neck: Normal range of motion  Neck supple  No JVD present  No thyromegaly present  Cardiovascular: Normal rate, regular rhythm and normal heart sounds  Exam reveals no gallop and no friction rub  No murmur heard  Pulmonary/Chest: Effort normal and breath sounds normal  No respiratory distress  He has no wheezes  He has no rales  Abdominal: Soft  Bowel sounds are normal  He exhibits no distension  There is tenderness  There is no rebound and no guarding  Musculoskeletal: Normal range of motion  He exhibits no edema  Lymphadenopathy:     He has no cervical adenopathy  Neurological: He is alert and oriented to person, place, and time  No cranial nerve deficit  Skin: Skin is warm  Psychiatric: He has a normal mood and affect  His behavior is normal    Nursing note and vitals reviewed        Vital Signs  ED Triage Vitals   Temperature Pulse Respirations Blood Pressure SpO2   11/14/18 0855 11/14/18 0855 11/14/18 0855 11/14/18 0855 11/14/18 0855   (!) 96 1 °F (35 6 °C) 90 20 131/94 99 %      Temp src Heart Rate Source Patient Position - Orthostatic VS BP Location FiO2 (%)   -- 11/14/18 1120 11/14/18 1120 11/14/18 1120 --    Monitor Lying Right arm       Pain Score       11/14/18 0855       8           Vitals:    11/14/18 1045 11/14/18 1100 11/14/18 1115 11/14/18 1120   BP:    115/72   Pulse: 72 69 71 70   Patient Position - Orthostatic VS:    Lying       Visual Acuity      ED Medications  Medications   sodium chloride 0 9 % bolus 1,000 mL (0 mL Intravenous Stopped 11/14/18 1020)   ketorolac (TORADOL) injection 15 mg (15 mg Intravenous Given 11/14/18 0903)   iohexol (OMNIPAQUE) 350 MG/ML injection (MULTI-DOSE) 100 mL (100 mL Intravenous Given 11/14/18 1002)   HYDROmorphone (DILAUDID) injection 0 5 mg (0 5 mg Intravenous Given 11/14/18 1101)       Diagnostic Studies  Results Reviewed     Procedure Component Value Units Date/Time    UA w Reflex to Microscopic w Reflex to Culture [[id number]] Collected:  11/14/18 1004    Lab Status:  Final result Specimen:  Urine from Urine, Clean Catch Updated:  11/14/18 1035     Color, UA Yellow     Clarity, UA Clear     Specific Gravity, UA <=1 005     pH, UA 7 0     Leukocytes, UA Negative     Nitrite, UA Negative     Protein, UA Negative mg/dl      Glucose, UA Negative mg/dl      Ketones, UA Negative mg/dl      Urobilinogen, UA 0 2 E U /dl      Bilirubin, UA Negative     Blood, UA Negative    Comprehensive metabolic panel [[id number]] Collected:  11/14/18 0900    Lab Status:  Final result Specimen:  Blood from Arm, Right Updated:  11/14/18 0916     Sodium 139 mmol/L      Potassium 4 0 mmol/L      Chloride 103 mmol/L      CO2 28 mmol/L      ANION GAP 8 mmol/L      BUN 8 mg/dL      Creatinine 0 92 mg/dL      Glucose 131 mg/dL      Calcium 9 1 mg/dL      AST 24 U/L      ALT 30 U/L      Alkaline Phosphatase 60 U/L      Total Protein 7 6 g/dL      Albumin 3 8 g/dL      Total Bilirubin 0 80 mg/dL      eGFR 97 ml/min/1 73sq m     Narrative:         National Kidney Disease Education Program recommendations are as follows:  GFR calculation is accurate only with a steady state creatinine  Chronic Kidney disease less than 60 ml/min/1 73 sq  meters  Kidney failure less than 15 ml/min/1 73 sq  meters      CBC and differential [[id number]]  (Normal) Collected:  11/14/18 0900    Lab Status:  Final result Specimen:  Blood from Arm, Right Updated:  11/14/18 0921     WBC 6 16 Thousand/uL      RBC 4 68 Million/uL      Hemoglobin 14 9 g/dL      Hematocrit 43 6 %      MCV 93 fL      MCH 31 8 pg      MCHC 34 2 g/dL      RDW 13 4 %      MPV 9 1 fL      Platelets 852 Thousands/uL      Neutrophils Relative 62 %      Immat GRANS % 0 %      Lymphocytes Relative 24 %      Monocytes Relative 11 %      Eosinophils Relative 3 %      Basophils Relative 0 % Neutrophils Absolute 3 82 Thousands/µL      Immature Grans Absolute 0 00 Thousand/uL      Lymphocytes Absolute 1 48 Thousands/µL      Monocytes Absolute 0 65 Thousand/µL      Eosinophils Absolute 0 20 Thousand/µL      Basophils Absolute 0 01 Thousands/µL                  CT abdomen pelvis with contrast   Final Result by Miguelito Torres MD (11/14 1034)      Mild uncomplicated acute sigmoid diverticulitis  Workstation performed: [id number]                    Procedures  Procedures       Phone Contacts  ED Phone Contact    ED Course                               MDM  Number of Diagnoses or Management Options  Sigmoid diverticulitis: new and requires workup  Diagnosis management comments: No abscess seen on diverticulitis on CT, continue cipo/flagyl, f/u pcp  Patient also started on trazodone recently for anxiety, continue  Amount and/or Complexity of Data Reviewed  Clinical lab tests: reviewed and ordered  Tests in the radiology section of CPT®: reviewed and ordered  Tests in the medicine section of CPT®: ordered and reviewed  Independent visualization of images, tracings, or specimens: yes    Patient Progress  Patient progress: stable    CritCare Time    Disposition  Final diagnoses:   Sigmoid diverticulitis     Time reflects when diagnosis was documented in both MDM as applicable and the Disposition within this note     Time User Action Codes Description Comment    11/14/2018 10:58 AM Fiorella Jeffers Add [K57 32] Sigmoid diverticulitis       ED Disposition     ED Disposition Condition Comment    Discharge  Everett Colder discharge to home/self care      Condition at discharge: Good        Follow-up Information    None         Discharge Medication List as of 11/14/2018 11:08 AM      START taking these medications    Details   oxyCODONE (ROXICODONE) 5 mg immediate release tablet Take 1 tablet (5 mg total) by mouth every 4 (four) hours as needed for moderate pain for up to 3 days Max Daily Amount: 30 mg, Starting Wed 11/14/2018, Until Sat 11/17/2018, Normal         CONTINUE these medications which have NOT CHANGED    Details   ciprofloxacin (CIPRO) 500 mg tablet Take 1 tablet (500 mg total) by mouth every 12 (twelve) hours for 10 days, Starting Mon 11/12/2018, Until Thu 11/22/2018, Normal      clonazePAM (KlonoPIN) 1 mg tablet Take 1 tablet (1 mg total) by mouth every 8 (eight) hours, Starting Wed 9/19/2018, Print      FLUoxetine (PROzac) 40 MG capsule Take 1 capsule (40 mg total) by mouth daily, Starting Wed 9/19/2018, Print      metroNIDAZOLE (FLAGYL) 500 mg tablet Take 1 tablet (500 mg total) by mouth every 8 (eight) hours for 10 days, Starting Mon 11/12/2018, Until Thu 11/22/2018, Normal      traZODone (DESYREL) 50 mg tablet Take 1 tablet (50 mg total) by mouth daily at bedtime, Starting Mon 11/12/2018, Normal           No discharge procedures on file      ED Provider  Electronically Signed by           Jayy Light DO  11/19/18 6976

## 2018-11-15 LAB
ALBUMIN SERPL-MCNC: 4.3 G/DL (ref 3.5–5.5)
ALBUMIN/GLOB SERPL: 1.8 {RATIO} (ref 1.2–2.2)
ALP SERPL-CCNC: 58 IU/L (ref 39–117)
ALT SERPL-CCNC: 20 IU/L (ref 0–44)
AST SERPL-CCNC: 22 IU/L (ref 0–40)
BASOPHILS # BLD AUTO: 0 X10E3/UL (ref 0–0.2)
BASOPHILS NFR BLD AUTO: 0 %
BILIRUB SERPL-MCNC: 0.6 MG/DL (ref 0–1.2)
BUN SERPL-MCNC: 8 MG/DL (ref 6–24)
BUN/CREAT SERPL: 8 (ref 9–20)
CALCIUM SERPL-MCNC: 9.3 MG/DL (ref 8.7–10.2)
CHLORIDE SERPL-SCNC: 100 MMOL/L (ref 96–106)
CO2 SERPL-SCNC: 25 MMOL/L (ref 20–29)
CREAT SERPL-MCNC: 1 MG/DL (ref 0.76–1.27)
EOSINOPHIL # BLD AUTO: 0.2 X10E3/UL (ref 0–0.4)
EOSINOPHIL NFR BLD AUTO: 4 %
ERYTHROCYTE [DISTWIDTH] IN BLOOD BY AUTOMATED COUNT: 13.2 % (ref 12.3–15.4)
GLOBULIN SER-MCNC: 2.4 G/DL (ref 1.5–4.5)
GLUCOSE SERPL-MCNC: 128 MG/DL (ref 65–99)
HCT VFR BLD AUTO: 42.8 % (ref 37.5–51)
HGB BLD-MCNC: 14.6 G/DL (ref 13–17.7)
IMM GRANULOCYTES # BLD: 0 X10E3/UL (ref 0–0.1)
IMM GRANULOCYTES NFR BLD: 0 %
LYMPHOCYTES # BLD AUTO: 1.6 X10E3/UL (ref 0.7–3.1)
LYMPHOCYTES NFR BLD AUTO: 26 %
MCH RBC QN AUTO: 31.9 PG (ref 26.6–33)
MCHC RBC AUTO-ENTMCNC: 34.1 G/DL (ref 31.5–35.7)
MCV RBC AUTO: 93 FL (ref 79–97)
MONOCYTES # BLD AUTO: 0.6 X10E3/UL (ref 0.1–0.9)
MONOCYTES NFR BLD AUTO: 10 %
NEUTROPHILS # BLD AUTO: 3.5 X10E3/UL (ref 1.4–7)
NEUTROPHILS NFR BLD AUTO: 60 %
PLATELET # BLD AUTO: 309 X10E3/UL (ref 150–379)
POTASSIUM SERPL-SCNC: 4.2 MMOL/L (ref 3.5–5.2)
PROT SERPL-MCNC: 6.7 G/DL (ref 6–8.5)
RBC # BLD AUTO: 4.58 X10E6/UL (ref 4.14–5.8)
SL AMB EGFR AFRICAN AMERICAN: 102 ML/MIN/1.73
SL AMB EGFR NON AFRICAN AMERICAN: 88 ML/MIN/1.73
SODIUM SERPL-SCNC: 137 MMOL/L (ref 134–144)
WBC # BLD AUTO: 5.9 X10E3/UL (ref 3.4–10.8)

## 2018-11-19 ENCOUNTER — HOSPITAL ENCOUNTER (EMERGENCY)
Facility: HOSPITAL | Age: 48
End: 2018-11-20
Attending: EMERGENCY MEDICINE | Admitting: PSYCHIATRY & NEUROLOGY
Payer: COMMERCIAL

## 2018-11-19 DIAGNOSIS — F32.1 CURRENT MODERATE EPISODE OF MAJOR DEPRESSIVE DISORDER WITHOUT PRIOR EPISODE (HCC): ICD-10-CM

## 2018-11-19 DIAGNOSIS — T50.901A OVERDOSE: Primary | ICD-10-CM

## 2018-11-19 LAB
ALBUMIN SERPL BCP-MCNC: 3.8 G/DL (ref 3.5–5)
ALP SERPL-CCNC: 55 U/L (ref 46–116)
ALT SERPL W P-5'-P-CCNC: 60 U/L (ref 12–78)
ANION GAP SERPL CALCULATED.3IONS-SCNC: 14 MMOL/L (ref 4–13)
APAP SERPL-MCNC: <2 UG/ML (ref 10–30)
AST SERPL W P-5'-P-CCNC: 37 U/L (ref 5–45)
BASOPHILS # BLD AUTO: 0.02 THOUSANDS/ΜL (ref 0–0.1)
BASOPHILS NFR BLD AUTO: 0 % (ref 0–1)
BILIRUB SERPL-MCNC: 0.6 MG/DL (ref 0.2–1)
BUN SERPL-MCNC: 9 MG/DL (ref 5–25)
CALCIUM SERPL-MCNC: 8.8 MG/DL (ref 8.3–10.1)
CHLORIDE SERPL-SCNC: 105 MMOL/L (ref 100–108)
CO2 SERPL-SCNC: 23 MMOL/L (ref 21–32)
CREAT SERPL-MCNC: 1.06 MG/DL (ref 0.6–1.3)
EOSINOPHIL # BLD AUTO: 0.22 THOUSAND/ΜL (ref 0–0.61)
EOSINOPHIL NFR BLD AUTO: 3 % (ref 0–6)
ERYTHROCYTE [DISTWIDTH] IN BLOOD BY AUTOMATED COUNT: 12.6 % (ref 11.6–15.1)
ETHANOL SERPL-MCNC: <3 MG/DL (ref 0–3)
GFR SERPL CREATININE-BSD FRML MDRD: 82 ML/MIN/1.73SQ M
GLUCOSE SERPL-MCNC: 136 MG/DL (ref 65–140)
HCT VFR BLD AUTO: 45.4 % (ref 36.5–49.3)
HGB BLD-MCNC: 15.5 G/DL (ref 12–17)
IMM GRANULOCYTES # BLD AUTO: 0.01 THOUSAND/UL (ref 0–0.2)
IMM GRANULOCYTES NFR BLD AUTO: 0 % (ref 0–2)
LYMPHOCYTES # BLD AUTO: 2.46 THOUSANDS/ΜL (ref 0.6–4.47)
LYMPHOCYTES NFR BLD AUTO: 31 % (ref 14–44)
MAGNESIUM SERPL-MCNC: 1.9 MG/DL (ref 1.6–2.6)
MCH RBC QN AUTO: 31.6 PG (ref 26.8–34.3)
MCHC RBC AUTO-ENTMCNC: 34.1 G/DL (ref 31.4–37.4)
MCV RBC AUTO: 93 FL (ref 82–98)
MONOCYTES # BLD AUTO: 0.61 THOUSAND/ΜL (ref 0.17–1.22)
MONOCYTES NFR BLD AUTO: 8 % (ref 4–12)
NEUTROPHILS # BLD AUTO: 4.59 THOUSANDS/ΜL (ref 1.85–7.62)
NEUTS SEG NFR BLD AUTO: 58 % (ref 43–75)
NRBC BLD AUTO-RTO: 0 /100 WBCS
PLATELET # BLD AUTO: 297 THOUSANDS/UL (ref 149–390)
PMV BLD AUTO: 9.1 FL (ref 8.9–12.7)
POTASSIUM SERPL-SCNC: 3.2 MMOL/L (ref 3.5–5.3)
PROT SERPL-MCNC: 7.4 G/DL (ref 6.4–8.2)
RBC # BLD AUTO: 4.91 MILLION/UL (ref 3.88–5.62)
SALICYLATES SERPL-MCNC: <3 MG/DL (ref 3–20)
SODIUM SERPL-SCNC: 142 MMOL/L (ref 136–145)
WBC # BLD AUTO: 7.91 THOUSAND/UL (ref 4.31–10.16)

## 2018-11-19 PROCEDURE — 83735 ASSAY OF MAGNESIUM: CPT | Performed by: EMERGENCY MEDICINE

## 2018-11-19 PROCEDURE — 80329 ANALGESICS NON-OPIOID 1 OR 2: CPT | Performed by: EMERGENCY MEDICINE

## 2018-11-19 PROCEDURE — 96365 THER/PROPH/DIAG IV INF INIT: CPT

## 2018-11-19 PROCEDURE — 80053 COMPREHEN METABOLIC PANEL: CPT | Performed by: EMERGENCY MEDICINE

## 2018-11-19 PROCEDURE — 80320 DRUG SCREEN QUANTALCOHOLS: CPT | Performed by: EMERGENCY MEDICINE

## 2018-11-19 PROCEDURE — 96361 HYDRATE IV INFUSION ADD-ON: CPT

## 2018-11-19 PROCEDURE — 85025 COMPLETE CBC W/AUTO DIFF WBC: CPT | Performed by: EMERGENCY MEDICINE

## 2018-11-19 PROCEDURE — 99285 EMERGENCY DEPT VISIT HI MDM: CPT

## 2018-11-19 PROCEDURE — 36415 COLL VENOUS BLD VENIPUNCTURE: CPT | Performed by: EMERGENCY MEDICINE

## 2018-11-19 RX ORDER — POTASSIUM CHLORIDE 14.9 MG/ML
20 INJECTION INTRAVENOUS ONCE
Status: COMPLETED | OUTPATIENT
Start: 2018-11-19 | End: 2018-11-20

## 2018-11-19 RX ADMIN — SODIUM CHLORIDE 1000 ML: 0.9 INJECTION, SOLUTION INTRAVENOUS at 22:11

## 2018-11-19 RX ADMIN — POTASSIUM CHLORIDE 20 MEQ: 200 INJECTION, SOLUTION INTRAVENOUS at 23:46

## 2018-11-20 ENCOUNTER — HOSPITAL ENCOUNTER (INPATIENT)
Facility: HOSPITAL | Age: 48
LOS: 6 days | Discharge: HOME | DRG: 885 | End: 2018-11-26
Attending: PSYCHIATRY & NEUROLOGY | Admitting: PSYCHIATRY & NEUROLOGY
Payer: COMMERCIAL

## 2018-11-20 VITALS
BODY MASS INDEX: 25.33 KG/M2 | OXYGEN SATURATION: 94 % | SYSTOLIC BLOOD PRESSURE: 119 MMHG | RESPIRATION RATE: 16 BRPM | DIASTOLIC BLOOD PRESSURE: 74 MMHG | HEIGHT: 69 IN | TEMPERATURE: 98.3 F | HEART RATE: 96 BPM | WEIGHT: 171 LBS

## 2018-11-20 DIAGNOSIS — F41.9 ANXIETY: ICD-10-CM

## 2018-11-20 DIAGNOSIS — F32.1 CURRENT MODERATE EPISODE OF MAJOR DEPRESSIVE DISORDER WITHOUT PRIOR EPISODE (HCC): Primary | ICD-10-CM

## 2018-11-20 DIAGNOSIS — G47.00 INSOMNIA: ICD-10-CM

## 2018-11-20 LAB
AMPHETAMINES SERPL QL SCN: NEGATIVE
ATRIAL RATE: 69 BPM
ATRIAL RATE: 78 BPM
BARBITURATES UR QL: NEGATIVE
BENZODIAZ UR QL: POSITIVE
COCAINE UR QL: NEGATIVE
METHADONE UR QL: NEGATIVE
OPIATES UR QL SCN: NEGATIVE
P AXIS: 70 DEGREES
P AXIS: 82 DEGREES
PCP UR QL: NEGATIVE
PR INTERVAL: 144 MS
PR INTERVAL: 144 MS
QRS AXIS: 47 DEGREES
QRS AXIS: 65 DEGREES
QRSD INTERVAL: 80 MS
QRSD INTERVAL: 84 MS
QT INTERVAL: 390 MS
QT INTERVAL: 422 MS
QTC INTERVAL: 444 MS
QTC INTERVAL: 452 MS
T WAVE AXIS: 45 DEGREES
T WAVE AXIS: 59 DEGREES
THC UR QL: NEGATIVE
VENTRICULAR RATE: 69 BPM
VENTRICULAR RATE: 78 BPM

## 2018-11-20 PROCEDURE — 93005 ELECTROCARDIOGRAM TRACING: CPT

## 2018-11-20 PROCEDURE — 93010 ELECTROCARDIOGRAM REPORT: CPT | Performed by: INTERNAL MEDICINE

## 2018-11-20 PROCEDURE — 99449 NTRPROF PH1/NTRNET/EHR 31/>: CPT | Performed by: EMERGENCY MEDICINE

## 2018-11-20 PROCEDURE — 80307 DRUG TEST PRSMV CHEM ANLYZR: CPT | Performed by: EMERGENCY MEDICINE

## 2018-11-20 PROCEDURE — 96366 THER/PROPH/DIAG IV INF ADDON: CPT

## 2018-11-20 RX ORDER — HALOPERIDOL 5 MG
5 TABLET ORAL EVERY 6 HOURS PRN
Status: DISCONTINUED | OUTPATIENT
Start: 2018-11-20 | End: 2018-11-26 | Stop reason: HOSPADM

## 2018-11-20 RX ORDER — POTASSIUM CHLORIDE 20 MEQ/1
20 TABLET, EXTENDED RELEASE ORAL ONCE
Status: COMPLETED | OUTPATIENT
Start: 2018-11-20 | End: 2018-11-20

## 2018-11-20 RX ORDER — NICOTINE 21 MG/24HR
1 PATCH, TRANSDERMAL 24 HOURS TRANSDERMAL DAILY
Status: CANCELLED | OUTPATIENT
Start: 2018-11-20

## 2018-11-20 RX ORDER — METRONIDAZOLE 250 MG/1
500 TABLET ORAL EVERY 8 HOURS SCHEDULED
Status: COMPLETED | OUTPATIENT
Start: 2018-11-20 | End: 2018-11-22

## 2018-11-20 RX ORDER — CLONAZEPAM 0.5 MG/1
1 TABLET ORAL EVERY 8 HOURS PRN
Status: CANCELLED | OUTPATIENT
Start: 2018-11-20

## 2018-11-20 RX ORDER — OLANZAPINE 10 MG/1
10 INJECTION, POWDER, LYOPHILIZED, FOR SOLUTION INTRAMUSCULAR EVERY 8 HOURS PRN
Status: CANCELLED | OUTPATIENT
Start: 2018-11-20

## 2018-11-20 RX ORDER — OLANZAPINE 10 MG/1
10 TABLET ORAL EVERY 8 HOURS PRN
Status: CANCELLED | OUTPATIENT
Start: 2018-11-20

## 2018-11-20 RX ORDER — RISPERIDONE 1 MG/1
1 TABLET, ORALLY DISINTEGRATING ORAL
Status: CANCELLED | OUTPATIENT
Start: 2018-11-20

## 2018-11-20 RX ORDER — LORAZEPAM 1 MG/1
1 TABLET ORAL EVERY 6 HOURS PRN
Status: DISCONTINUED | OUTPATIENT
Start: 2018-11-20 | End: 2018-11-26 | Stop reason: HOSPADM

## 2018-11-20 RX ORDER — CLONAZEPAM 1 MG/1
1 TABLET ORAL 2 TIMES DAILY
Status: DISCONTINUED | OUTPATIENT
Start: 2018-11-20 | End: 2018-11-26 | Stop reason: HOSPADM

## 2018-11-20 RX ORDER — MAGNESIUM HYDROXIDE/ALUMINUM HYDROXICE/SIMETHICONE 120; 1200; 1200 MG/30ML; MG/30ML; MG/30ML
30 SUSPENSION ORAL EVERY 4 HOURS PRN
Status: CANCELLED | OUTPATIENT
Start: 2018-11-20

## 2018-11-20 RX ORDER — BENZTROPINE MESYLATE 1 MG/ML
1 INJECTION INTRAMUSCULAR; INTRAVENOUS EVERY 6 HOURS PRN
Status: DISCONTINUED | OUTPATIENT
Start: 2018-11-20 | End: 2018-11-26 | Stop reason: HOSPADM

## 2018-11-20 RX ORDER — ACETAMINOPHEN 325 MG/1
650 TABLET ORAL EVERY 6 HOURS PRN
Status: CANCELLED | OUTPATIENT
Start: 2018-11-20

## 2018-11-20 RX ORDER — ZIPRASIDONE HYDROCHLORIDE 20 MG/1
20 CAPSULE ORAL EVERY 4 HOURS PRN
Status: DISCONTINUED | OUTPATIENT
Start: 2018-11-20 | End: 2018-11-26 | Stop reason: HOSPADM

## 2018-11-20 RX ORDER — BENZTROPINE MESYLATE 1 MG/1
1 TABLET ORAL EVERY 6 HOURS PRN
Status: DISCONTINUED | OUTPATIENT
Start: 2018-11-20 | End: 2018-11-26 | Stop reason: HOSPADM

## 2018-11-20 RX ORDER — ZOLPIDEM TARTRATE 5 MG/1
5 TABLET ORAL
Status: DISCONTINUED | OUTPATIENT
Start: 2018-11-20 | End: 2018-11-26 | Stop reason: HOSPADM

## 2018-11-20 RX ORDER — ZOLPIDEM TARTRATE 5 MG/1
5 TABLET ORAL
Status: CANCELLED | OUTPATIENT
Start: 2018-11-20

## 2018-11-20 RX ORDER — LORAZEPAM 2 MG/ML
2 INJECTION INTRAMUSCULAR EVERY 6 HOURS PRN
Status: CANCELLED | OUTPATIENT
Start: 2018-11-20

## 2018-11-20 RX ORDER — BENZTROPINE MESYLATE 1 MG/ML
1 INJECTION INTRAMUSCULAR; INTRAVENOUS EVERY 6 HOURS PRN
Status: CANCELLED | OUTPATIENT
Start: 2018-11-20

## 2018-11-20 RX ORDER — HALOPERIDOL 5 MG
5 TABLET ORAL EVERY 6 HOURS PRN
Status: CANCELLED | OUTPATIENT
Start: 2018-11-20

## 2018-11-20 RX ORDER — LORAZEPAM 2 MG/ML
2 INJECTION INTRAMUSCULAR EVERY 6 HOURS PRN
Status: DISCONTINUED | OUTPATIENT
Start: 2018-11-20 | End: 2018-11-26 | Stop reason: HOSPADM

## 2018-11-20 RX ORDER — ACETAMINOPHEN 325 MG/1
650 TABLET ORAL EVERY 6 HOURS PRN
Status: DISCONTINUED | OUTPATIENT
Start: 2018-11-20 | End: 2018-11-26 | Stop reason: HOSPADM

## 2018-11-20 RX ORDER — MAGNESIUM HYDROXIDE/ALUMINUM HYDROXICE/SIMETHICONE 120; 1200; 1200 MG/30ML; MG/30ML; MG/30ML
30 SUSPENSION ORAL EVERY 4 HOURS PRN
Status: DISCONTINUED | OUTPATIENT
Start: 2018-11-20 | End: 2018-11-26 | Stop reason: HOSPADM

## 2018-11-20 RX ORDER — ZIPRASIDONE MESYLATE 20 MG/ML
20 INJECTION, POWDER, LYOPHILIZED, FOR SOLUTION INTRAMUSCULAR EVERY 4 HOURS PRN
Status: DISCONTINUED | OUTPATIENT
Start: 2018-11-20 | End: 2018-11-26 | Stop reason: HOSPADM

## 2018-11-20 RX ORDER — HALOPERIDOL 5 MG/ML
5 INJECTION INTRAMUSCULAR EVERY 6 HOURS PRN
Status: CANCELLED | OUTPATIENT
Start: 2018-11-20

## 2018-11-20 RX ORDER — RISPERIDONE 1 MG/1
1 TABLET, ORALLY DISINTEGRATING ORAL
Status: DISCONTINUED | OUTPATIENT
Start: 2018-11-20 | End: 2018-11-26 | Stop reason: HOSPADM

## 2018-11-20 RX ORDER — HALOPERIDOL 5 MG/ML
5 INJECTION INTRAMUSCULAR EVERY 6 HOURS PRN
Status: DISCONTINUED | OUTPATIENT
Start: 2018-11-20 | End: 2018-11-26 | Stop reason: HOSPADM

## 2018-11-20 RX ORDER — BENZTROPINE MESYLATE 0.5 MG/1
1 TABLET ORAL EVERY 6 HOURS PRN
Status: CANCELLED | OUTPATIENT
Start: 2018-11-20

## 2018-11-20 RX ADMIN — CLONAZEPAM 1 MG: 1 TABLET ORAL at 18:19

## 2018-11-20 RX ADMIN — METRONIDAZOLE 500 MG: 250 TABLET ORAL at 21:52

## 2018-11-20 RX ADMIN — METRONIDAZOLE 500 MG: 250 TABLET ORAL at 15:54

## 2018-11-20 RX ADMIN — POTASSIUM CHLORIDE 20 MEQ: 1500 TABLET, EXTENDED RELEASE ORAL at 03:22

## 2018-11-20 NOTE — ED NOTES
Provided comfortable chair for patients wife and given pillow and warm blankets  Gave patient ice pack for IV site for c/o burning from potassium infusion  Patient and wife resting comfortably        Crow Daugherty RN  11/20/18 0002

## 2018-11-20 NOTE — ED NOTES
Marv Dobbins from crisis called requesting telecrisis be set up to communicate with the patient   Setting up telecrisis and getting consent form signed by patient      Suha Brandt, ANGLE  11/20/18 7631

## 2018-11-20 NOTE — ED PROVIDER NOTES
History  Chief Complaint   Patient presents with    Overdose - Intentional     Pt took twelve 50mg Trazodone  Pt reports a lot of stress at home  He reports he does not want to wake up tomorrow  He also has not been feeling well due to diverticulitis  Patient brought in by ambulance for intentional overdose - he took 12 tablets of trazodone about 1 hour ago  He admits to thoughts of killing himself  The trazodone was recently prescribed for anxiety  Patient is sleepy and no other complaints  He was here recently for diverticulitis and is taking abx  Prior to Admission Medications   Prescriptions Last Dose Informant Patient Reported? Taking?    FLUoxetine (PROzac) 40 MG capsule   No No   Sig: Take 1 capsule (40 mg total) by mouth daily   ciprofloxacin (CIPRO) 500 mg tablet   No No   Sig: Take 1 tablet (500 mg total) by mouth every 12 (twelve) hours for 10 days   clonazePAM (KlonoPIN) 1 mg tablet   No No   Sig: Take 1 tablet (1 mg total) by mouth every 8 (eight) hours   metroNIDAZOLE (FLAGYL) 500 mg tablet   No No   Sig: Take 1 tablet (500 mg total) by mouth every 8 (eight) hours for 10 days   traZODone (DESYREL) 50 mg tablet   No No   Sig: Take 1 tablet (50 mg total) by mouth daily at bedtime      Facility-Administered Medications: None       Past Medical History:   Diagnosis Date    Anxiety     Asthma     Depression     Hyperlipemia     RESOLVED [id number]    Medial meniscus tear     LAST ASSESSED [id number]    Psychiatric disorder     Psychiatric illness     Suicide attempt [facility])        Past Surgical History:   Procedure Laterality Date    ARTHROSCOPY KNEE Right     ONSET 7/3/2014    TONSILLECTOMY AND ADENOIDECTOMY      TOOTH EXTRACTION      WISDOM TEETH       Family History   Problem Relation Age of Onset    Diabetes Mother     Hypertension Mother     Prostate cancer Father     Substance Abuse Neg Hx     Mental illness Neg Hx      I have reviewed and agree with the history as documented  Social History   Substance Use Topics    Smoking status: Current Every Day Smoker     Packs/day: 1 00     Years: 30 00     Types: Cigarettes    Smokeless tobacco: Never Used    Alcohol use No      Comment: sober 3 1/2 years        Review of Systems   All other systems reviewed and are negative  Physical Exam  Physical Exam   Constitutional: He is oriented to person, place, and time  He appears well-developed and well-nourished  drowsy   HENT:   Mouth/Throat: Oropharynx is clear and moist    Eyes: Pupils are equal, round, and reactive to light  Conjunctivae and EOM are normal    Neck: Normal range of motion  Neck supple  No spinous process tenderness present  Cardiovascular: Normal rate, regular rhythm, normal heart sounds and intact distal pulses  Pulmonary/Chest: Effort normal and breath sounds normal  No respiratory distress  He has no wheezes  Abdominal: Soft  Bowel sounds are normal  He exhibits no distension  There is no tenderness  Musculoskeletal: Normal range of motion  Neurological: He is alert and oriented to person, place, and time  He has normal strength  No cranial nerve deficit or sensory deficit  He displays a negative Romberg sign  GCS eye subscore is 3  GCS verbal subscore is 5  GCS motor subscore is 6  Skin: Skin is warm and dry  No rash noted  Psychiatric: His speech is normal  He is withdrawn  Cognition and memory are normal  He expresses impulsivity  He exhibits a depressed mood  He expresses suicidal ideation  He expresses suicidal plans  Nursing note and vitals reviewed        Vital Signs  ED Triage Vitals   Temperature Pulse Respirations Blood Pressure SpO2   11/19/18 2149 11/19/18 2149 11/19/18 2149 11/19/18 2149 11/19/18 2150   98 3 °F (36 8 °C) 87 15 110/67 93 %      Temp Source Heart Rate Source Patient Position - Orthostatic VS BP Location FiO2 (%)   11/19/18 2149 11/20/18 0700 11/20/18 0700 11/19/18 2149 --   Temporal Monitor Lying Right arm Pain Score       11/19/18 2150       No Pain           Vitals:    11/20/18 0400 11/20/18 0415 11/20/18 0430 11/20/18 0700   BP: 103/58 105/60 113/61 119/74   Pulse: 66 68 71 96   Patient Position - Orthostatic VS:    Lying       Visual Acuity      ED Medications  Medications   sodium chloride 0 9 % bolus 1,000 mL (0 mL Intravenous Stopped 11/19/18 2322)   potassium chloride 20 mEq IVPB (premix) (0 mEq Intravenous Stopped 11/20/18 0150)   potassium chloride (K-DUR,KLOR-CON) CR tablet 20 mEq (20 mEq Oral Given 11/20/18 0322)       Diagnostic Studies  Results Reviewed     Procedure Component Value Units Date/Time    Rapid drug screen, urine [[id number]]  (Abnormal) Collected:  11/20/18 0200    Lab Status:  Final result Specimen:  Urine from Urine, Clean Catch Updated:  11/20/18 0221     Amph/Meth UR Negative     Barbiturate Ur Negative     Benzodiazepine Urine Positive (A)     Cocaine Urine Negative     Methadone Urine Negative     Opiate Urine Negative     PCP Ur Negative     THC Urine Negative    Narrative:         Presumptive report  If requested, specimen will be sent to reference lab for confirmation  FOR MEDICAL PURPOSES ONLY  IF CONFIRMATION NEEDED PLEASE CONTACT THE LAB WITHIN 5 DAYS      Drug Screen Cutoff Levels:  AMPHETAMINE/METHAMPHETAMINES  1000 ng/mL  BARBITURATES     200 ng/mL  BENZODIAZEPINES     200 ng/mL  COCAINE      300 ng/mL  METHADONE      300 ng/mL  OPIATES      300 ng/mL  PHENCYCLIDINE     25 ng/mL  THC       50 ng/mL    Magnesium [[id number]]  (Normal) Collected:  11/19/18 2208    Lab Status:  Final result Specimen:  Blood from Arm, Right Updated:  11/19/18 2350     Magnesium 1 9 mg/dL     Ethanol [[id number]]  (Normal) Collected:  11/19/18 2208    Lab Status:  Final result Specimen:  Blood from Arm, Right Updated:  11/19/18 2304     Ethanol Lvl <3 mg/dL     Comprehensive metabolic panel [[id number]]  (Abnormal) Collected:  11/19/18 2208    Lab Status:  Final result Specimen:  Blood from Arm, Right Updated:  11/19/18 2251     Sodium 142 mmol/L      Potassium 3 2 (L) mmol/L      Chloride 105 mmol/L      CO2 23 mmol/L      ANION GAP 14 (H) mmol/L      BUN 9 mg/dL      Creatinine 1 06 mg/dL      Glucose 136 mg/dL      Calcium 8 8 mg/dL      AST 37 U/L      ALT 60 U/L      Alkaline Phosphatase 55 U/L      Total Protein 7 4 g/dL      Albumin 3 8 g/dL      Total Bilirubin 0 60 mg/dL      eGFR 82 ml/min/1 73sq m     Narrative:         National Kidney Disease Education Program recommendations are as follows:  GFR calculation is accurate only with a steady state creatinine  Chronic Kidney disease less than 60 ml/min/1 73 sq  meters  Kidney failure less than 15 ml/min/1 73 sq  meters      Salicylate level [[id number]]  (Abnormal) Collected:  11/19/18 2208    Lab Status:  Final result Specimen:  Blood from Arm, Right Updated:  58/03/80 7344     Salicylate Lvl <3 (L) mg/dL     Acetaminophen level [[id number]]  (Abnormal) Collected:  11/19/18 2208    Lab Status:  Final result Specimen:  Blood from Arm, Right Updated:  11/19/18 2247     Acetaminophen Level <2 (L) ug/mL     CBC and differential [[id number]] Collected:  11/19/18 2208    Lab Status:  Final result Specimen:  Blood from Arm, Right Updated:  11/19/18 2225     WBC 7 91 Thousand/uL      RBC 4 91 Million/uL      Hemoglobin 15 5 g/dL      Hematocrit 45 4 %      MCV 93 fL      MCH 31 6 pg      MCHC 34 1 g/dL      RDW 12 6 %      MPV 9 1 fL      Platelets 219 Thousands/uL      nRBC 0 /100 WBCs      Neutrophils Relative 58 %      Immat GRANS % 0 %      Lymphocytes Relative 31 %      Monocytes Relative 8 %      Eosinophils Relative 3 %      Basophils Relative 0 %      Neutrophils Absolute 4 59 Thousands/µL      Immature Grans Absolute 0 01 Thousand/uL      Lymphocytes Absolute 2 46 Thousands/µL      Monocytes Absolute 0 61 Thousand/µL      Eosinophils Absolute 0 22 Thousand/µL      Basophils Absolute 0 02 Thousands/µL                  No orders to display Procedures  ECG 12 Lead Documentation  Date/Time: 11/19/2018 11:00 PM  Performed by: Buzz De Paz by: Trinity Glover     Indications / Diagnosis:  Overdose  ECG reviewed by me, the ED Provider: yes    Patient location:  ED  Previous ECG:     Previous ECG:  Unavailable  Interpretation:     Interpretation: abnormal    Rate:     ECG rate:  95    ECG rate assessment: normal    Rhythm:     Rhythm: sinus rhythm    Ectopy:     Ectopy: none    QRS:     QRS axis:  Normal    QRS intervals:  Normal  Conduction:     Conduction: normal    ST segments:     ST segments:  Normal  T waves:     T waves: normal    Other findings:     Other findings: prolonged qTc interval    ECG 12 Lead Documentation  Date/Time: 11/20/2018 3:31 AM  Performed by: Trinity Glover  Authorized by: Trinity Glover     Indications / Diagnosis:  Overdose comparison EKG  ECG reviewed by me, the ED Provider: yes    Patient location:  ED  Previous ECG:     Previous ECG:  Compared to current    Comparison ECG info:  5 hours prior    Similarity:  Changes noted  Interpretation:     Interpretation: normal    Rate:     ECG rate:  69    ECG rate assessment: normal    Rhythm:     Rhythm: sinus rhythm    Ectopy:     Ectopy: none    QRS:     QRS axis:  Normal    QRS intervals:  Normal  Conduction:     Conduction: normal    ST segments:     ST segments:  Normal  T waves:     T waves: normal             Phone Contacts  ED Phone Contact    ED Course  ED Course as of Nov 21 1323   Tue Nov 20, 2018   0239 Patient oriented, easily arousable - medically cleared for psychiatric admission    0240 Paged crisis    0246 Crisis recommends consult toxicology as psychiatrist won't eval patient for 12 hours after trazodone overdose    0248 Paged Dr Josiah Lacey tox    0919 Paged tox    6565 Spoke with Dr Josiah Lacey - will confirm if extended release or regular trazodone      6767 Recovering alcoholic last drank 3 years ago    0406 Patient's wife just went home and called back - she looked at the pills and bottle - the trazodone is regular strength    0603 Patient signed 201 and intake - they were faxed to crisis and on clipboard - now crisis starting bed search         signed out to Dr Irina Esparza - patient medically cleared and signed 201  Bed search will need to be resumed this morning                          MDM  Number of Diagnoses or Management Options  Overdose: new and requires workup     Amount and/or Complexity of Data Reviewed  Clinical lab tests: ordered and reviewed  Obtain history from someone other than the patient: yes  Discuss the patient with other providers: yes    Patient Progress  Patient progress: improved    CritCare Time    Disposition  Final diagnoses:   Overdose - acute intentional, trazodone     Time reflects when diagnosis was documented in both MDM as applicable and the Disposition within this note     Time User Action Codes Description Comment    11/20/2018  3:28 AM Che Lessen Add Earline Mouse Overdose     11/20/2018  6:48 AM Che Lessen Modify [T50 901A] Overdose acute intentional, trazodone    11/20/2018 11:38 AM Thi Font Add [F32 1] Current moderate episode of major depressive disorder without prior episode ([facility] [address] )     11/20/2018 11:38 AM [place] Font Modify [F32 1] Current moderate episode of major depressive disorder without prior episode [facility])       ED Disposition     None      MD Documentation      Most Recent Value   Accepting Physician  [address] [place] [address] Name, Gracy Noa U  91  2w   Sending MD Dr Neno Ga       RN Documentation      [facility] Name, Gracy Noa U  91  2w      Follow-up Information    None         Discharge Medication List as of 11/20/2018  1:48 PM      CONTINUE these medications which have NOT CHANGED    Details   ciprofloxacin (CIPRO) 500 mg tablet Take 1 tablet (500 mg total) by mouth every 12 (twelve) hours for 10 days, Starting Mon 11/12/2018, Until Thu 11/22/2018, Normal clonazePAM (KlonoPIN) 1 mg tablet Take 1 tablet (1 mg total) by mouth every 8 (eight) hours, Starting Wed 9/19/2018, Print      FLUoxetine (PROzac) 40 MG capsule Take 1 capsule (40 mg total) by mouth daily, Starting Wed 9/19/2018, Print      metroNIDAZOLE (FLAGYL) 500 mg tablet Take 1 tablet (500 mg total) by mouth every 8 (eight) hours for 10 days, Starting Mon 11/12/2018, Until Thu 11/22/2018, Normal      traZODone (DESYREL) 50 mg tablet Take 1 tablet (50 mg total) by mouth daily at bedtime, Starting Mon 11/12/2018, Normal           No discharge procedures on file      ED Provider  Electronically Signed by           Morelia Mitchell DO  11/21/18 3132

## 2018-11-20 NOTE — ED NOTES
Received report from [address] [place] [address]  Pt is resting in bed, respirations visible  Easily able to wake up  Tech at bedside obtaining vitals        Nikolay Ramon RN  11/20/18 1633

## 2018-11-20 NOTE — ED NOTES
Pt transported to [address] [place] [address] with security and tech        Alban Bernheim, RN  11/20/18 6319

## 2018-11-20 NOTE — ED NOTES
Insurance Authorization:   Phone call placed to [phone]  Phone number: [employer] to Juana Paiz    7 days approved    Level of care: inpatient psych  Review on 11-26-18   Authorization # [id number]

## 2018-11-20 NOTE — ED NOTES
Spoke to David Samano from Crisis to inform her that patient has been medically cleared by toxicology        Nadira Sneed RN  11/20/18 0610

## 2018-11-20 NOTE — ED NOTES
Placido Carlos from Crisis stated that patient can not be medically cleared for 12 hours after patient took trazadone unless  will medically clear patient   Dr Meme Metcalf putting a call into on call        Mp Rice, ANGLE  11/20/18 5825

## 2018-11-20 NOTE — CONSULTS
PHONE [facility] [phone] Toxicology  Papo Garcia 51 y o  male MRN: [id number]  Unit/Bed#: ED 08 Encounter: [id number]      Reason for Consult / Principal Problem: trazodone overdose  Inpatient consult to Toxicology  Consult performed by: Brenna Nunes ordered by: Jolene Wang        11/20/18  330AM    ASSESSMENT:  26-year-old male with acute trazodone overdose  1  Intentional, acute, trazodone overdose  2  Mild hypokalemia     RECOMMENDATIONS:  Please monitor patient for 6 hours if trazodone was immediate release and 12 hours trazodone was extended release  Please rule out coingestants  Please assure EKG intervals are unremarkable with QRS interval less than 100 milliseconds and QTC interval less than 500 milliseconds  Provided patient's vital signs and mental status are normal, with normal EKG and laboratory values, and no other medical concerns are present at the end of observation period, he can be cleared from toxicity associated with trazodone  She become symptomatic and admitted, please call for further recommendations  For further questions, please contact the medical  on call via [place] [employer] or throughl the [employer]  Service or [facility] [place] [facility]  Please see additional teaching note below     Department of Medical Toxicology  [address] [place] [address]  Trazodone Toxicity Discussion    Background:  Trazodone is an atypical tetracyclic antidepressant with anxiolytic and hypnotic properties  It is primarily used to treat depression and in some cases, to aid in sleep  Pharmacology:   Mechanisms of Action:   Trazodone increases serotonin activity by three mechanisms:   1  3-ytirqgjexwxupineg-1S (5HT2A) receptor antagonism   2  Selective reuptake inhibition  3  Postsynaptic serotonin agonism via the active metabolite, m-chlorophenyl piperazine  Additionally, it is also an alpha-1 adrenergic receptor antagonist     Maximum daily dose is 400-600mg   Immediate release trazodone is absorbed rapidly with peak serum concentrations occurring in 1-2 hours after dosing  Serum concentration may not peak until at least 9 hours with extended release formulary  Toxicity:  Inadvertent ingestions are rarely associated with toxicity  Overdoses generally produce mild to moderate toxicity  Symptoms may include drowsiness, lethargy, ataxia, nausea, vomiting, myoclonus, seizures, bradycardia, hypotension, and priapism  There are also reports of QT prolongation, torsades de pointes, and serotonin syndrome  Treatment:  Treatment is supportive  Asymptomatic patients can be observed for 4-6 hours if immediate release is ingested and 12-16 hours if extended release is ingested  If no development of symptoms during that timeframe, then the patient will unlikely become ill  Symptomatic patients should be admitted  Hypotension is treated with intravenous fluids, and if necessary, vasopressors  Bradycardia associated with hypotension may be treated with atropine or vasopressors  Seizures can be treated with benzodiazepines  QT prolongation should be treated with electrolyte optimization  Resources: [employer] [place]  Last updated 9/21/18          Hx and PE limited by the dynamics of a phone consultation  I have not personally interviewed or evaluated the patient, but only advised based on the information provided to me  Primary provider is responsible for all clinical decisions  Pertinent history, physical exam and clinical findings and course discussed: Vahid Vasquez is a 51y o  year old male who presents with report of ingesting 15 trazodone 50 milligram tablets  His vital signs were normal along with a normal EKG  Acetaminophen and salicylate concentrations were undetectable  CBC and CMP revealed mild hypokalemia  Review of systems and physical exam not performed by me      Historical Information   Past Medical History:   Diagnosis Date    Anxiety     Asthma     Depression     Hyperlipemia     RESOLVED [id number]    Medial meniscus tear     LAST ASSESSED [id number]    Psychiatric disorder      Past Surgical History:   Procedure Laterality Date    ARTHROSCOPY KNEE Right     ONSET 7/3/2014    TONSILLECTOMY AND ADENOIDECTOMY      TOOTH EXTRACTION      WISDOM TEETH     Social History   History   Alcohol Use No     Comment: OCCASIONAL USE PER [employer]SCR[employer]TS      History   Drug Use No     History   Smoking Status    Current Every Day Smoker    Packs/day: 1 00    Years: 30 00    Types: Cigarettes   Smokeless Tobacco    Never Used     Family History   Problem Relation Age of Onset    Diabetes Mother     Hypertension Mother     Prostate cancer Father     Substance Abuse Neg Hx     Mental illness Neg Hx         Prior to Admission medications    Medication Sig Start Date End Date Taking? Authorizing Provider   ciprofloxacin (CIPRO) 500 mg tablet Take 1 tablet (500 mg total) by mouth every 12 (twelve) hours for 10 days 11/12/18 11/22/18  Ronda Sandhu MD   clonazePAM (KlonoPIN) 1 mg tablet Take 1 tablet (1 mg total) by mouth every 8 (eight) hours 9/19/18   Ronda Sandhu MD   FLUoxetine (PROzac) 40 MG capsule Take 1 capsule (40 mg total) by mouth daily 9/19/18   Ronda Sandhu MD   metroNIDAZOLE (FLAGYL) 500 mg tablet Take 1 tablet (500 mg total) by mouth every 8 (eight) hours for 10 days 11/12/18 11/22/18  Ronda Sandhu MD   traZODone (DESYREL) 50 mg tablet Take 1 tablet (50 mg total) by mouth daily at bedtime 11/12/18   Ronda Sandhu MD       No current facility-administered medications for this encounter  Allergies   Allergen Reactions    Erythromycin Diarrhea     Other reaction(s): Abdominal pain       Objective     No intake or output data in the 24 hours ending 11/20/18 0329    Invasive Devices:   Peripheral IV 11/19/18 Right Antecubital (Active)   Site Assessment Clean;Dry; Intact 11/19/2018  9:54 PM   Dressing Type Transparent 11/19/2018  9:54 PM Vitals   Vitals:    11/20/18 0230 11/20/18 0245 11/20/18 0300 11/20/18 0315   BP: 104/52 104/57 104/56 101/58   TempSrc:       Pulse: 70 74 71 75   Resp: 12 14 12 12   Temp:             EKG, Pathology, and/or Other Studies: I have personally reviewed pertinent reports  Lab Results: I have personally reviewed pertinent reports  Labs:    Results from last 7 days  Lab Units 11/19/18 2208   WBC Thousand/uL 7 91   HEMOGLOBIN g/dL 15 5   HEMATOCRIT % 45 4   PLATELETS Thousands/uL 297   NEUTROS PCT % 58   LYMPHS PCT % 31   MONOS PCT % 8      Results from last 7 days  Lab Units 11/19/18 2208   POTASSIUM mmol/L 3 2*   CHLORIDE mmol/L 105   CO2 mmol/L 23   BUN mg/dL 9   CREATININE mg/dL 1 06   CALCIUM mg/dL 8 8   ALK PHOS U/L 55   ALT U/L 60   AST U/L 37   MAGNESIUM mg/dL 1 9              No results found for: TROPONINI        Results from last 7 days  Lab Units 11/19/18 2208   ACETAMINOPHEN LVL ug/mL <2*   ETHANOL LVL mg/dL <3   SALICYLATE LVL mg/dL <3*       Counseling / Coordination of Care  Total time spent today 45 minutes  This was a phone consultation

## 2018-11-20 NOTE — TREATMENT PLAN
RN will assess patient at least twice daily, educate patient on medications and diagnosis, and assist patient in developing and utilizing healthy coping skills

## 2018-11-20 NOTE — ED NOTES
Patient signing [phone]  Dr Sandy Guy present upon signature        [facility] Masters, RN  11/20/18 9137

## 2018-11-20 NOTE — ED NOTES
Patients wife called to clarify that the medication is not extended release  Patient is now medically cleared via toxicology        Cyn Toledo RN  11/20/18 2422

## 2018-11-20 NOTE — ED NOTES
Awaiting for call from patients wife to clarify if trazadone is extended release medication  Toxicology reported that if the medication is not an extended release, patient will be medically cleared  Patients wife leaving at this time and will call department when she is home to clarify        Julian Lewis RN  11/20/18 0459

## 2018-11-20 NOTE — ED NOTES
Pt was accepted at [facility] 2w by Dr Abelardo Mullen   2W will call the ed when pts bed is ready

## 2018-11-20 NOTE — ED NOTES
Pt resting in room, wife called for update pt wanted to talk to her   Gave pt phone in room            Traci Murcia, Psychiatric hospital[facility]  11/20/18 7874

## 2018-11-20 NOTE — PROGRESS NOTES
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  Pt said that his son is coming home from his 22nd rehab stay and is concerned about him coming home rather than going to a recovery house  Pt said that after argument he took 12 trazodone and felt weird so he told wife and was then brought to ED  Pt reports feeling suicidal for about one week prior to this  Pt reports hx of SA in high school but denies since that time  Pt said he didn't think he would act on his SI  Reports recently starting trazodone and feels this may have increased his suicidal thoughts  Pt prescribed psychiatric meds from his PCP and does not see a therapist or psychiatrist  Pt denies SI now and contracts for safety  Pt said his  and wife visited in ED and he is feeling better now  Recently treated for diverticulitis  Denies any other medical concerns  Denies legal issues  Hx of alcohol abuse but has been sober 3 1/2 years  Denies drug use  Pt weak and ambulating with unsteady gait upon admission  Pt said this was due to recent overdose and does not normally have difficulties with ambulation  Encouraged fluids  Skin assessment/contraband check completed  Pt oriented to unit rules/routine  Pleasant and cooperative

## 2018-11-20 NOTE — ED NOTES
Assumed care of this patient with no previous cardiac assessment conducted   My assessment is the first to be charted      Estrella Valdes RN  11/20/18 7583

## 2018-11-21 ENCOUNTER — TELEPHONE (OUTPATIENT)
Dept: FAMILY MEDICINE CLINIC | Facility: CLINIC | Age: 48
End: 2018-11-21

## 2018-11-21 PROBLEM — F33.2 SEVERE EPISODE OF RECURRENT MAJOR DEPRESSIVE DISORDER, WITHOUT PSYCHOTIC FEATURES (HCC): Status: ACTIVE | Noted: 2018-03-19

## 2018-11-21 PROCEDURE — 99252 IP/OBS CONSLTJ NEW/EST SF 35: CPT | Performed by: PHYSICIAN ASSISTANT

## 2018-11-21 PROCEDURE — 99222 1ST HOSP IP/OBS MODERATE 55: CPT | Performed by: PSYCHIATRY & NEUROLOGY

## 2018-11-21 RX ORDER — FLUOXETINE HYDROCHLORIDE 20 MG/1
40 CAPSULE ORAL DAILY
Status: DISCONTINUED | OUTPATIENT
Start: 2018-11-21 | End: 2018-11-26 | Stop reason: HOSPADM

## 2018-11-21 RX ORDER — ZOLPIDEM TARTRATE 5 MG/1
5 TABLET ORAL
Status: DISCONTINUED | OUTPATIENT
Start: 2018-11-21 | End: 2018-11-22

## 2018-11-21 RX ADMIN — METRONIDAZOLE 500 MG: 250 TABLET ORAL at 21:28

## 2018-11-21 RX ADMIN — FLUOXETINE 40 MG: 20 CAPSULE ORAL at 12:10

## 2018-11-21 RX ADMIN — METRONIDAZOLE 500 MG: 250 TABLET ORAL at 14:35

## 2018-11-21 RX ADMIN — CLONAZEPAM 1 MG: 1 TABLET ORAL at 17:07

## 2018-11-21 RX ADMIN — METRONIDAZOLE 500 MG: 250 TABLET ORAL at 06:21

## 2018-11-21 RX ADMIN — CLONAZEPAM 1 MG: 1 TABLET ORAL at 08:57

## 2018-11-21 RX ADMIN — ZOLPIDEM TARTRATE 5 MG: 5 TABLET, COATED ORAL at 21:28

## 2018-11-21 NOTE — H&P
Psychiatric Evaluation - [facility] 51 y o  male MRN: [id number]  Unit/Bed#: -01 Encounter: [id number]    Assessment/Plan   Principal Problem:    Severe episode of recurrent major depressive disorder, without psychotic features ([facility] [address] )  Active Problems:    Anxiety disorder    Plan:   1  Check admission labs  2  Collaborate with family for baseline assessment and disposition planning  3  Continue fluoxetine 40 mg daily for depression management  4  Continue clonazepam 1 mg b i d  For anxiety management  5  Add Ambien 5 mg at HS for insomnia management  Risks, benefits and possible side effects of Medications:   Risks, benefits, and possible side effects of medications explained to patient and patient verbalizes understanding  Chief Complaint: "I don't want to go on living like this"    History of Present Illness     Patient is a 51 y o  male presents on 12 with recent worsening of depression and anxiety with suicidal ideation  Patient presented after overdose on 12 tablets of trazodone 50 mg each  Patient was observed in the emergency room and admitted to inpatient psychiatric unit once medically stable  Patient is denying any physical symptoms at this time  Patient reports multiple stressors including family stressors, argument with wife, son about to come home from rehabilitation and job stressors  Patient was also having relapse of diverticulitis pain which is less on admission  Patient is currently on metronidazole  Patient reports doing well but recently started feeling depressed with decline in sleep  His primary care physician prescribed trazodone 2 weeks ago  Patient reports this resulted in worsening of depression further with poor sleep, low energy, poor concentration  Patient is regretting his suicide attempt and reports depression got worse after addition of trazodone  Patient denies endorsing manic or psychotic symptoms    Patient agreed with plan of switching trazodone to another sleep medication  Patient reports poor response to melatonin  Discussed the option of hydroxyzine, Ambien, temazepam and Seroquel  Patient agreed with plan of considering Ambien tonight  He does report feeling safe on the unit and is consenting for safety on the unit  No access to firearm reported by patient  Medical Review Of Systems:  none    Psychiatric Review Of Systems:  sleep: yes  appetite changes: no  weight changes: no  energy/anergy: yes  interest/pleasure/anhedonia: no  somatic symptoms: yes  anxiety/panic: yes  mahnaz: no  guilty/hopeless: yes  self injurious behavior/risky behavior: yes    Historical Information     Past Psychiatric History:   Denies prior inpatient psychiatric admissions in past   Currently in treatment with PCP  Past Suicide attempts: Attempted to overdose twice during high school but never asked for help  Past Violent behavior: no  Past Psychiatric medication trial:  Prozac for 3 years, Klonopin for last 1 year, trazodone for last 2 weeks    Substance Abuse History:  History of alcohol abuse in past   Patient is sober for 3 and half years now  I spent time with patient in counseling and education on risk of substance abuse  Assessed him motivation and encouraged patient for treatment  Brief intervention done       Social History     Tobacco History     Smoking Status  Current Every Day Smoker Smoking Frequency  1 pack/day for 30 years (30 pk yrs) Smoking Tobacco Type  Cigarettes    Smokeless Tobacco Use  Never Used          Alcohol History     Alcohol Use Status  No Comment  sober 3 1/2 years          Drug Use     Drug Use Status  No          Sexual Activity     Sexually Active  Not Asked          Activities of Daily Living    Not Asked               Additional Substance Use Detail     Questions Responses    Substance Use Assessment Denies substance use within the past 12 months    Alcohol Use Frequency Prior dependence    Cannabis frequency Never used    Comment: Never used on 11/20/2018     Cocaine frequency Never used    Comment: Never used on 11/20/2018     Crack Cocaine Frequency Denies use in past 12 months    Methamphetamine Frequency Denies use in past 12 months    Narcotic Frequency Denies use in past 12 months    Benzodiazepine Frequency Daily    Amphetamine frequency Denies use in past 12 months    Benzodiazepine Last Use & Amount prescribed    Barbituate Frequency Denies use use in past 12 months    Hallucinogen frequency Never used    Comment: Never used on 11/20/2018     Ecstasy frequency Never used    Comment: Never used on 11/20/2018     Other drug frequency Never used    Comment: Never used on 11/20/2018     Opiate frequency Denies use in past 12 months    Last reviewed by Mell Mckeon RN on 11/20/2018        I have assessed this patient for substance use within the past 12 months    Family Psychiatric History:   Maternal aunt with history of suicide attempt  alcohol abuse in father side of family  Son with history of substance abuse  Social History:  Learning Disabilities: none  Marital history:   Living arrangement, social support: Support systems: Lives with wife and son  Occupational History: unknown occupation  Functioning Relationships: good support system    Other Pertinent History: Financial      Traumatic History:   Abuse: none  Other Traumatic Events: see HPI    Past Medical History:   Diagnosis Date    Anxiety     Asthma     Depression     Hyperlipemia     RESOLVED [id number]    Medial meniscus tear     LAST ASSESSED [id number]    Psychiatric disorder     Psychiatric illness     Suicide attempt [facility])            Meds/Allergies   all current active meds have been reviewed  Allergies   Allergen Reactions    Erythromycin Diarrhea     Other reaction(s): Abdominal pain       Objective   Vital signs in last 24 hours:  Temp:  [98 1 °F (36 7 °C)-99 1 °F (37 3 °C)] 99 1 °F (37 3 °C)  HR:  [73-93] 93  Resp: [17-20] 18  BP: (110-124)/(65-83) 110/65    No intake or output data in the 24 hours ending 11/21/18 1322    Mental Status Evaluation:  Appearance:  casually dressed   Behavior:  guarded   Speech:  soft   Mood:  anxious and depressed   Affect:  constricted   Language: naming objects   Thought Process:  circumstantial   Thought Content:  obsessions   Perceptual Disturbances: None   Risk Potential: Suicidal Ideations without plan, Homicidal Ideations none and Potential for Aggression No   Sensorium:  person and place   Cognition:  grossly intact   Consciousness:  awake    Attention: attention span appeared shorter than expected for age   Intellect: normal   Fund of Knowledge: awareness of current events: fair   Insight:  fair   Judgment: fair   Muscle Strength and Tone: arm(s): bilateral   Gait/Station: normal gait/station   Motor Activity: no abnormal movements     Memory: Short and long term memory  fair       Laboratory results:    I have personally reviewed all pertinent laboratory/tests results  Labs in last 72 hours:   Recent Labs      11/19/18   2208   WBC  7 91   RBC  4 91   HGB  15 5   HCT  45 4   PLT  297   RDW  12 6   NEUTROABS  4 59   SODIUM  142   K  3 2*   CL  105   CO2  23   BUN  9   CREATININE  1 06   GLUC  136   CALCIUM  8 8   AST  37   ALT  60   ALKPHOS  55   TP  7 4   ALB  3 8   TBILI  0 60     Admission Labs:   Admission on 11/20/2018   Component Date Value    Ventricular Rate 11/20/2018 78     Atrial Rate 11/20/2018 78     ND Interval 11/20/2018 144     QRSD Interval 11/20/2018 80     QT Interval 11/20/2018 390     QTC Interval 11/20/2018 444     P Axis 11/20/2018 70     QRS Axis 11/20/2018 47     T Wave Axis 11/20/2018 45          EKG, Pathology, and Other Studies: reviewed      Risks / Benefits of Treatment:     Risks, benefits, and possible side effects of medications explained to patient  The patient verbalizes understanding and agreement for treatment       Counseling / Coordination of Care:     Patient's presentation on admission and proposed treatment plan discussed with treatment team   Diagnosis, medication changes and treatment plan reviewed with patient  Recent stressors discussed with patient     Events leading to admission reviewed with patient  Importance of medication and treatment compliance reviewed with patient  Inpatient Psychiatric Certification:     Certification: Based upon physical, mental and social evaluations, I certify that inpatient psychiatric services are medically necessary for this patient for a duration of 7 midnights for the treatment of Severe episode of recurrent major depressive disorder, without psychotic features ([facility] [address] )    Available alternative community resources do not meet the patient's mental health care needs  I further attest that an established written individualized plan of care has been implemented and is outlined in the patient's medical records  This note has been constructed using a voice recognition system  There may be translation, syntax,  or grammatical errors  If you have any questions, please contact the dictating provider

## 2018-11-21 NOTE — PROGRESS NOTES
Pt is calm and cooperative, denies SI  Positive visit with mother and father  Attending meals and groups  Visible in milieu  Pt is pleasant and positive in conversation

## 2018-11-21 NOTE — CASE MANAGEMENT
BETO outreached to pt's PCP and spoke with Rosa Elena Hickey ([phone]) who reports that the practice is happy to continue to see pt despite overdose on Trazodone  BETO provided information regarding medication changes with the addition of Ambien and the reduction in Klonopin  Appointment scheduled for 12/4/18 @ 9:15  BETO added this appointment to pt's dc instructions

## 2018-11-21 NOTE — PROGRESS NOTES
Pt visible on unit, adjusting well  Positive visit with wife  Reports continued drowsiness today, requesting to sleep and then work on attending group activities tomorrow  Continue to monitor

## 2018-11-21 NOTE — CONSULTS
Consultation - Ivonne Gifford 51 y o  male MRN: [id number]    Unit/Bed#: [facility] 258-01 Encounter: [id number]      Assessment/Plan   Depression with SI and attempt  Medically cleared for inpatient psychiatric evaluation and therapy    Acute uncomplicated diverticulitis  On day 10 of Cipro/Flagyl, pain resolved, diarrhea resolved  Finish abx slowly increase fiber in diet    History of Present Illness   HPI: Ivonne Gifford is a 51y o  year old male who presents with increased depression and anxiety with SA with overdose of Trazodone  He reports recently being seen by his PCP with a diverticulitis flare and was started on Ciprofloxacin and Flagyl  He reports the abdominal pain improved a few days ago and his diarrhea stopped yesterday  He reports he is tolerating his diet  He denies fevers or chills  Inpatient consult for Medical Clearance for [facility] patient  Consult performed by: Maite Childs  Consult ordered by: Angelica Duron          Review of Systems   Constitutional: Negative  HENT: Negative  Eyes: Negative  Respiratory: Negative  Cardiovascular: Negative  Gastrointestinal: Negative for abdominal pain, blood in stool, constipation, diarrhea, nausea and vomiting  Genitourinary: Negative  Musculoskeletal: Negative  Neurological: Negative  Psychiatric/Behavioral: Positive for dysphoric mood and suicidal ideas         Historical Information   Past Medical History:   Diagnosis Date    Anxiety     Asthma     Depression     Hyperlipemia     RESOLVED [id number]    Medial meniscus tear     LAST ASSESSED [id number]    Psychiatric disorder     Psychiatric illness     Suicide attempt [facility])      Past Surgical History:   Procedure Laterality Date    ARTHROSCOPY KNEE Right     ONSET 7/3/2014    TONSILLECTOMY AND ADENOIDECTOMY      TOOTH EXTRACTION      WISDOM TEETH     Social History   History   Alcohol Use No     Comment: sober 3 1/2 years     History   Drug Use No     History   Smoking Status    Current Every Day Smoker    Packs/day: 1 00    Years: 30 00    Types: Cigarettes   Smokeless Tobacco    Never Used     Family History: non-contributory    Meds/Allergies   all current active meds have been reviewed and PTA meds:   Prior to Admission Medications   Prescriptions Last Dose Informant Patient Reported? Taking? FLUoxetine (PROzac) 40 MG capsule   No No   Sig: Take 1 capsule (40 mg total) by mouth daily   ciprofloxacin (CIPRO) 500 mg tablet   No No   Sig: Take 1 tablet (500 mg total) by mouth every 12 (twelve) hours for 10 days   clonazePAM (KlonoPIN) 1 mg tablet   No No   Sig: Take 1 tablet (1 mg total) by mouth every 8 (eight) hours   metroNIDAZOLE (FLAGYL) 500 mg tablet   No No   Sig: Take 1 tablet (500 mg total) by mouth every 8 (eight) hours for 10 days   traZODone (DESYREL) 50 mg tablet   No No   Sig: Take 1 tablet (50 mg total) by mouth daily at bedtime      Facility-Administered Medications: None     Allergies   Allergen Reactions    Erythromycin Diarrhea     Other reaction(s): Abdominal pain       Objective   Vitals: Blood pressure 110/65, pulse 93, temperature 99 1 °F (37 3 °C), temperature source Tympanic, resp  rate 18, height 5' 9" (1 753 m), weight 70 7 kg (155 lb 12 8 oz), SpO2 95 %  No intake or output data in the 24 hours ending 11/21/18 1035  Invasive Devices          No matching active lines, drains, or airways          Physical Exam   Constitutional: He is oriented to person, place, and time  He appears well-developed and well-nourished  No distress  HENT:   Head: Normocephalic and atraumatic  Eyes: Pupils are equal, round, and reactive to light  EOM are normal    Neck: Normal range of motion  Cardiovascular: Normal rate and regular rhythm  Exam reveals no gallop and no friction rub  No murmur heard  Pulmonary/Chest: Effort normal  He has no wheezes  He has no rales  Abdominal: Soft  He exhibits no distension  There is no tenderness  There is no rebound  Musculoskeletal: Normal range of motion  Neurological: He is alert and oriented to person, place, and time  Skin: Skin is warm and dry  No rash noted  Psychiatric: His speech is normal and behavior is normal  His mood appears anxious  He exhibits a depressed mood  Lab Results: I have personally reviewed pertinent reports  Imaging Studies: I have personally reviewed pertinent reports

## 2018-11-21 NOTE — DISCHARGE INSTR - OTHER ORDERS
If you are in a Crisis situation Call [phone] to speak to a trained crisis worker - Anytime - Day or Night  You may also call one of the numbers below:  [employer]     [place]:   [phone]

## 2018-11-21 NOTE — TREATMENT PLAN
TREATMENT PLAN REVIEW - [address] Yarelis Gill 51 y o  1970 male MRN: [id number]    [address] [place] [address] Room / Bed: [facility] 258/[facility] 533- Encounter: [id number]          Admit Date/Time:  11/20/2018  1:51 PM    Treatment Team: Attending Provider: Nahun Vasquez; : Andrey Aceves; Registered Nurse: Jennifer Posey, RN; Patient Care Technician: Brissa Sanchez; Patient Care Assistant: Yue Carballo;  Registered Nurse: Melinda Escalera RN; Registered Nurse: Olivier Rodriguez, RN; Medications RN: Beryle Scarpa, RN; Occupational Therapy Assistant: TR Mora; Patient Care Technician: Jatinder Castrejon; Nursing Student: Amarilis Valverde    Diagnosis: Principal Problem:    Severe episode of recurrent major depressive disorder, without psychotic features [facility])  Active Problems:    Anxiety disorder    Patient Strengths/Assets: cooperative, compliant with medication, good past treatment response, motivation for treatment/growth, patient is on a voluntary commitment    Patient Barriers/Limitations: low self esteem    Short Term Goals: decrease in depressive symptoms, decrease in anxiety symptoms, decrease in suicidal thoughts    Long Term Goals: improvement in depression, improvement in anxiety, stabilization of mood, free of suicidal thoughts, acceptance of need for psychiatric medications, acceptance of need for psychiatric treatment, acceptance of need for psychiatric follow up after discharge    Progress Towards Goals: starting psychiatric medications as prescribed    Recommended Treatment: medication management, patient medication education, group therapy, milieu therapy, continued Behavioral Health psychiatric evaluation/assessment process    Treatment Frequency: daily medication monitoring, group and milieu therapy daily, monitoring through interdisciplinary rounds, monitoring through weekly patient care conferences    Expected Discharge Date: 5-7 days    Discharge Plan: referral for outpatient medication management with a psychiatrist, referral for outpatient psychotherapy    Treatment Plan Created/Updated By: Aleta Roberts

## 2018-11-21 NOTE — PROGRESS NOTES
Pt calm and cooperative, pleasant in conversation  Denies current SI  Pt reports feeling "a little fuzzy" r/t recent OD, gait appears steady  Pt states he stopped going to Hinduism and began isolating which he realizes was not a good decision  Intends to return to [employer] and spoke of positive visitor from  while in ED  Pt is attending groups and meals  Will continue to monitor
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
## 2018-11-21 NOTE — CASE MANAGEMENT
SW outreached to pt's wife Johan Amador to discuss pt's hospitalization  Left voicemail requesting a call back [phone])

## 2018-11-21 NOTE — TELEPHONE ENCOUNTER
pc behavior health unit--[facility]--pt will be D/C on Monday 11/26/18--they wanted to make appt now--pt will need to be called for f/u question tcm--overdose

## 2018-11-22 PROCEDURE — 99232 SBSQ HOSP IP/OBS MODERATE 35: CPT | Performed by: PSYCHIATRY & NEUROLOGY

## 2018-11-22 RX ORDER — LANOLIN ALCOHOL/MO/W.PET/CERES
3 CREAM (GRAM) TOPICAL
Status: DISCONTINUED | OUTPATIENT
Start: 2018-11-22 | End: 2018-11-26 | Stop reason: HOSPADM

## 2018-11-22 RX ADMIN — CLONAZEPAM 1 MG: 1 TABLET ORAL at 18:03

## 2018-11-22 RX ADMIN — FLUOXETINE 40 MG: 20 CAPSULE ORAL at 09:08

## 2018-11-22 RX ADMIN — METRONIDAZOLE 500 MG: 250 TABLET ORAL at 06:10

## 2018-11-22 RX ADMIN — CLONAZEPAM 1 MG: 1 TABLET ORAL at 09:08

## 2018-11-22 NOTE — PROGRESS NOTES
Pt reports "It was a good day"  Attending groups and meals  Reports positive visits with family since admission  Expecting visit this evening from wife and son who is recently discharged from [facility] rehab  Pt is calm and cooperative, pleasant in interaction    Denies any further SI

## 2018-11-22 NOTE — PROGRESS NOTES
Pt states mood is improved  He denies S/H/I, and has contracted for safety  He states anxiety is low on scale of 1-10  He shared that he was incontinent of U/A last night in his sleep due to the Ambien  He also shared that Trazodone has the same effect  He shared that the multiple stressors of work, an argument with his wife, and concern of his son's recovery are what led to S/I  He realizes that isolation was another factor which led to thoughts of self harm and he plans to stay involved in his [employer] orthodox  Will continue to monitor, provide support and encouragement

## 2018-11-22 NOTE — PROGRESS NOTES
Progress Note - Behavioral Health     Tyler Harkins 51 y o  male MRN: @MRN   Unit/Bed#: [facility] 258-01 Encounter: [id number]    Per nursing report and sign out, patient has been depressed but no significant issues  Tyler Harkins reports today that "I am good, but I am still cloudy headed from what I did"  No SI now  Notes he does not know what he was thinking, just had to get it out of his system he guesses  Notes son is active heroin user, came back from 22nd stay  Anxiety about him coming home, and getting in a fight  He notes it was a measured OD, did not take all his meds in front of him  Then he got scared and told wife  A lot of support from family  Urinated in bed on trazodone, and again on [place]  Does not want ambien, or any sleep med tonight  Depression "I feel great today"  Anxiety- 5/10  No AVH, No SI or HI       Energy: a lot better  Sleep: difficult  Appetite: normal  Medication side effects: none, nocturia on ambien (likely slept through urge to urinate)   ROS: no complaints    Mental Status Evaluation:    Appearance:  age appropriate   Behavior:  pleasant, cooperative, with good eye contact   Speech:  Normal volume, regular rate and rhythm   Mood:  euthymic   Affect:  dysphoric, brighter with some improvement, constricted       Thought Process:  Linear and goal directed   Associations: intact associations   Thought Content:  normal and appropriate   Perceptual Disturbances: no auditory or visual hallcunations   Risk Potential: Suicidal ideation - None  Homicidal ideation - None  Potential for aggression - No   Sensorium:  A&Ox3   Cognition:  grossly intact   Consciousness:  Alert & Awake   Memory:  recent and remote memory grossly intact   Attention: attention span and concentration are age appropriate   Intellect: within normal limits       Insight:  fair   Judgment: fair   Muscle Strength  Muscle Tone normal  normal   Gait/Station: normal gait/station with good balance   Motor Activity: no abnormal movements       Vital signs in last 24 hours:    Temp:  [97 4 °F (36 3 °C)-98 1 °F (36 7 °C)] 97 6 °F (36 4 °C)  HR:  [] 106  Resp:  [18-20] 20  BP: (104-121)/(62-83) 121/62    Laboratory results:  I have personally reviewed all pertinent laboratory/tests results  Assessment/Plan   Principal Problem:    Severe episode of recurrent major depressive disorder, without psychotic features [facility])  Active Problems:    Anxiety disorder      Danilo Marrero is showing some progress, changes due to urination made to medications  Recommended Treatment:     Planned medication and treatment changes: All current active medications have been reviewed    Encourage group therapy, milieu therapy and occupational therapy  [address] checks as unit standard unless ordered or noted otherwise      Current Facility-Administered Medications:  acetaminophen 650 mg Oral Q6H PRN Rollo Pascal, PA-C   aluminum-magnesium hydroxide-simethicone 30 mL Oral Q4H PRN Rollo Pascal, PA-C   benztropine 1 mg Intramuscular Q6H PRN Rollo Pascal, PA-C   benztropine 1 mg Oral Q6H PRN Rollo Pascal, PA-C   clonazePAM 1 mg Oral BID Rollo Pascal, PA-C   FLUoxetine 40 mg Oral Daily [facility]   haloperidol 5 mg Oral Q6H PRN Rollo Pascal, PA-C   haloperidol lactate 5 mg Intramuscular Q6H PRN Rollo Pascal, PA-C   LORazepam 2 mg Intramuscular Q6H PRN Rollo Pascal, PA-C   LORazepam 1 mg Oral Q6H PRN Rollo Pascal, PA-C   magnesium hydroxide 30 mL Oral Daily PRN Rollo Pascal, PA-C   nicotine polacrilex 4 mg Oral Q2H PRN Rollo Pascal, PA-C   risperiDONE 1 mg Oral Q3H PRN Rollo Pascal, PA-C   ziprasidone 20 mg Oral Q4H PRN Rollo Pascal, PA-C   ziprasidone 20 mg Intramuscular Q4H PRN Rollo Pascal, PA-C   zolpidem 5 mg Oral HS PRN Rollo Pascal, PA-C   zolpidem 5 mg Oral HS Leon Adler       1) Stop Ambien, Start Melatonin 3mg   2) continue other medications and treatment  3) Continue to support patient and engage them in the programs available as feasible and appropriate  Continue case management support and therapy  Continue discharge planning  Risks / Benefits of Treatment:    Risks, benefits, and possible side effects of medications explained to patient and patient verbalizes understanding and agreement for treatment  Counseling / Coordination of Care:    Medication changes reviewed with nursing staff  Medications, treatment progress and treatment plan reviewed with patient        Lucio Gerber III, DO  11/22/2018  8:18 AM

## 2018-11-23 PROCEDURE — 99232 SBSQ HOSP IP/OBS MODERATE 35: CPT | Performed by: PSYCHIATRY & NEUROLOGY

## 2018-11-23 RX ADMIN — CLONAZEPAM 1 MG: 1 TABLET ORAL at 17:17

## 2018-11-23 RX ADMIN — FLUOXETINE 40 MG: 20 CAPSULE ORAL at 09:00

## 2018-11-23 RX ADMIN — MELATONIN 3 MG: at 23:07

## 2018-11-23 RX ADMIN — CLONAZEPAM 1 MG: 1 TABLET ORAL at 09:00

## 2018-11-23 NOTE — PROGRESS NOTES
Progress Note - [address] [facility] [address] [address] 51 y o  male MRN: [id number]  Unit/Bed#: -01 Encounter: [id number]    Assessment/Plan   Principal Problem:    Severe episode of recurrent major depressive disorder, without psychotic features ([address] )  Active Problems:    Anxiety disorder      Subjective:  Patient today reports depression and anxiety are lessening  Appears more future oriented and optimistic for discharge  Reports suicidal ideations have diminished and contracts for safety on the unit  Klonopin reported as effective for anxiety  Remains with poor sleep and he wakes up in middle of the night  Does not want to take Ambien due to incontinence  Trazodone had adverse effects to his mood leading to overdose  Will not add Restoril due to being on Klonopin  Does not like Seroquel  Only other option at this time is Atarax for insomnia  Agreed to utilize Melatonin p r n  Red Migue Appears to have improved insight and judgment  Reports good support system on the outside  No signs of mahnaz and denied psychosis  Medication compliant  Appears to be tolerating medications well without serious side effects  We will observe for safety over the weekend and tentatively discharge on Monday      Current Medications:  Current Facility-Administered Medications   Medication Dose Route Frequency    acetaminophen (TYLENOL) tablet 650 mg  650 mg Oral Q6H PRN    aluminum-magnesium hydroxide-simethicone (MYLANTA) 200-200-20 mg/5 mL oral suspension 30 mL  30 mL Oral Q4H PRN    benztropine (COGENTIN) injection 1 mg  1 mg Intramuscular Q6H PRN    benztropine (COGENTIN) tablet 1 mg  1 mg Oral Q6H PRN    clonazePAM (KlonoPIN) tablet 1 mg  1 mg Oral BID    FLUoxetine (PROzac) capsule 40 mg  40 mg Oral Daily    haloperidol (HALDOL) tablet 5 mg  5 mg Oral Q6H PRN    haloperidol lactate (HALDOL) injection 5 mg  5 mg Intramuscular Q6H PRN    LORazepam (ATIVAN) 2 mg/mL injection 2 mg  2 mg Intramuscular Q6H PRN    LORazepam (ATIVAN) tablet 1 mg  1 mg Oral Q6H PRN    magnesium hydroxide (MILK OF MAGNESIA) 400 mg/5 mL oral suspension 30 mL  30 mL Oral Daily PRN    melatonin tablet 3 mg  3 mg Oral HS PRN    nicotine polacrilex (NICORETTE) gum 4 mg  4 mg Oral Q2H PRN    risperiDONE (RisperDAL M-TABS) dispersible tablet 1 mg  1 mg Oral Q3H PRN    ziprasidone (GEODON) capsule 20 mg  20 mg Oral Q4H PRN    ziprasidone (GEODON) IM injection 20 mg  20 mg Intramuscular Q4H PRN    zolpidem (AMBIEN) tablet 5 mg  5 mg Oral HS PRN       Behavioral Health Medications: all current active meds have been reviewed and continue current psychiatric medications  Vitals:  Vitals:    11/23/18 0736   BP: 130/69   Pulse: 71   Resp: 18   Temp: (!) 97 2 °F (36 2 °C)   SpO2:        Laboratory results:    I have personally reviewed all pertinent laboratory/tests results    Most Recent Labs:   Lab Results   Component Value Date    WBC 7 91 11/19/2018    RBC 4 91 11/19/2018    HGB 15 5 11/19/2018    HCT 45 4 11/19/2018     11/19/2018    RDW 12 6 11/19/2018    NEUTROABS 4 59 11/19/2018    SODIUM 142 11/19/2018    K 3 2 (L) 11/19/2018     11/19/2018    CO2 23 11/19/2018    BUN 9 11/19/2018    CREATININE 1 06 11/19/2018    GLUC 136 11/19/2018    CALCIUM 8 8 11/19/2018    AST 37 11/19/2018    ALT 60 11/19/2018    ALKPHOS 55 11/19/2018    TP 7 4 11/19/2018    ALB 3 8 11/19/2018    TBILI 0 60 11/19/2018    CHOLESTEROL 187 03/14/2018    HDL 52 03/14/2018    TRIG 84 03/14/2018    LDLCALC 108 (H) 08/06/2016    HGBA1C 5 5 03/14/2018     03/14/2018       Psychiatric Review of Systems:  Behavior over the last 24 hours:  improved  Sleep:  Poor  Appetite: normal  Medication side effects: No  ROS: no complaints    Mental Status Evaluation:  Appearance:  casually dressed   Behavior:  Less guarded and cooperative   Speech:  soft   Mood:  Less anxious and depressed   Affect:  mood-congruent   Language Appropriate   Thought Process:  normal Thought Content:  normal   Denied delusions and obsessions   Perceptual Disturbances: None   Risk Potential: Diminished passive SI  Denied HI  Potential for aggression no   Sensorium:  person, place and time/date   Cognition:  grossly intact   Consciousness:  alert and awake    Recent and Remote Memory Intact   Attention: attention span and concentration were age appropriate   Insight:  Improved   Judgment: Improving   Gait/Station: normal gait/station and normal balance   Motor Activity: no abnormal movements     Progress Toward Goals:  Progressing    Recommended Treatment: Continue with group therapy, milieu therapy and occupational therapy  1   Continue current medications  2  Advised Melatonin 3mg HS PRN for insomnia management  3  Disposition planning    Risks, benefits and possible side effects of Medications:   Risks, benefits, and possible side effects of medications explained to patient and patient verbalizes understanding        Maggie Elena PA-C

## 2018-11-23 NOTE — PROGRESS NOTES
Pt socializing with peers, displays positive and calm affect  Denies symptoms  Positive visit with wife

## 2018-11-23 NOTE — PROGRESS NOTES
Pt reports klonopin is helpful for anxiety, but he can feel when it starts to wear off  Has been using ETOH most of his life to self medicate for anxiety, which he never knew he had until he stopped drinking  States he has strong support from his  and family members  Struggles with interrupted sleep, doesn't like ambien or trazodone because he falls into a deep sleep and is often incontinent  Discussed melatonin, which he has taken in the past  Active in the community, attends groups, sociable with peers  Working on developing coping skills to handle current life stressors/anxiety   Denies current SI

## 2018-11-23 NOTE — CASE MANAGEMENT
SW met with pt who reports that he is doing well  No concerns at this time  Pt has a good support structure  No further needs anticipated at time of dc

## 2018-11-24 PROCEDURE — 99232 SBSQ HOSP IP/OBS MODERATE 35: CPT | Performed by: PSYCHIATRY & NEUROLOGY

## 2018-11-24 RX ADMIN — LORAZEPAM 1 MG: 1 TABLET ORAL at 12:58

## 2018-11-24 RX ADMIN — CLONAZEPAM 1 MG: 1 TABLET ORAL at 19:54

## 2018-11-24 RX ADMIN — FLUOXETINE 40 MG: 20 CAPSULE ORAL at 09:26

## 2018-11-24 RX ADMIN — CLONAZEPAM 1 MG: 1 TABLET ORAL at 09:26

## 2018-11-24 NOTE — PROGRESS NOTES
Patient out in the community interacting appropriately with staff and peers  Denies any Sis or His but still has anxiety at times  Will continue to monitor

## 2018-11-24 NOTE — PROGRESS NOTES
Progress Note - Behavioral Health   Heather Munoz 51 y o  male MRN: [id number]  Unit/Bed#: [facility] [place] 258-01 Encounter: [id number]    The patient was seen for continuing care and reviewed with treatment team     Mental Status Evaluation:  Appearance:  Adequate hygiene and grooming   Behavior:  calm and cooperative   Mood:  Smiling with peers   Affect: appropriate   Speech: Normal rate and Normal volume   Thought Process:  Goal directed and coherent   Thought Content:  Does not verbalize delusional material   Perceptual Disturbances: Denies hallucinations and does not appear to be responding to internal stimuli   Risk Potential: No suicidal or homicidal ideation   Attention/Concentration attention span and concentration were age appropriate   Orientation:   Person, place   Gait/Station: normal gait/station   Motor Activity: No abnormal movement noted     Progress Toward Goals: Had visitors today, social on unit and per RN report has been denying SI  Assessment/Plan    Principal Problem:    Severe episode of recurrent major depressive disorder, without psychotic features ([facility] [address] )  Active Problems:    Anxiety disorder      Recommended Treatment: Continue with pharmacotherapy, group therapy, milieu therapy and occupational therapy    The patient will be maintained on the following medications:    Current Facility-Administered Medications:  acetaminophen 650 mg Oral Q6H PRN Jestine Mungo, PA-C   aluminum-magnesium hydroxide-simethicone 30 mL Oral Q4H PRN Jestine Mungo, PA-C   benztropine 1 mg Intramuscular Q6H PRN Jestine Mungo, PA-C   benztropine 1 mg Oral Q6H PRN Jestine Mungo, PA-C   clonazePAM 1 mg Oral BID Jestine Mungo, PA-C   FLUoxetine 40 mg Oral Daily Leon Adler   haloperidol 5 mg Oral Q6H PRN Jestine Mungo, PA-C   haloperidol lactate 5 mg Intramuscular Q6H PRN Jestine Mungo, PA-C   LORazepam 2 mg Intramuscular Q6H PRN Jestine Mungo, PA-C   LORazepam 1 mg Oral Q6H PRN Chano Boothe CORTES Linder   magnesium hydroxide 30 mL Oral Daily PRN Melvena Brayan, CORTES   melatonin 3 mg Oral HS PRN Unice Real III, DO   nicotine polacrilex 4 mg Oral Q2H PRN Melvena Brayan, CORTES   risperiDONE 1 mg Oral Q3H PRN Melvena Brayan, CORTES   ziprasidone 20 mg Oral Q4H PRN Melvena Brayan, CORTES   ziprasidone 20 mg Intramuscular Q4H PRN Melvena Brayan, CORTES   zolpidem 5 mg Oral HS PRN Melvena Brayan, CORTES       Risks, benefits and possible side effects of Medications:   Risks, benefits, and possible side effects of medications explained to patient and patient verbalizes understanding

## 2018-11-25 PROCEDURE — 99232 SBSQ HOSP IP/OBS MODERATE 35: CPT | Performed by: PSYCHIATRY & NEUROLOGY

## 2018-11-25 RX ADMIN — FLUOXETINE 40 MG: 20 CAPSULE ORAL at 08:24

## 2018-11-25 RX ADMIN — CLONAZEPAM 1 MG: 1 TABLET ORAL at 08:24

## 2018-11-25 RX ADMIN — CLONAZEPAM 1 MG: 1 TABLET ORAL at 18:26

## 2018-11-25 RX ADMIN — LORAZEPAM 1 MG: 1 TABLET ORAL at 12:14

## 2018-11-25 NOTE — PROGRESS NOTES
Progress Note - [address] 51 y o  male MRN: [id number]  Unit/Bed#: [facility] 258-01 Encounter: [id number]    The patient was seen for continuing care and reviewed with treatment team     Mental Status Evaluation:  Appearance:  Adequate hygiene and grooming   Behavior:  calm, cooperative and friendly   Mood:  euthymic   Affect: Mildly anxious   Speech: Normal rate and Normal volume   Thought Process:  Goal directed and coherent   Thought Content:  Does not verbalize delusional material   Perceptual Disturbances: Denies hallucinations and does not appear to be responding to internal stimuli   Risk Potential: Suicidal Ideations none and Homicidal Ideations none   Attention/Concentration attention span and concentration were age appropriate   Orientation:   Person, place   Gait/Station: normal gait/station   Motor Activity: No abnormal movement noted     Progress Toward Goals: He enjoied his visit with cousin yesterday and feels that he is making contacts here that he hopes to continue when he gets discharged  Wants to take time for himself and minimizes events that brought him in "the ED doc said he would commit me if I didn't sign in, it wasn't that bad, if I wanted to die I would have taken more and not carefully line up the pills " Does however feel benefit from treatment and medications, has been social on the unit, attending groups and denies SI  Assessment/Plan    Principal Problem:    Severe episode of recurrent major depressive disorder, without psychotic features ([address] )  Active Problems:    Anxiety disorder      Recommended Treatment: Continue with pharmacotherapy, group therapy, milieu therapy and occupational therapy    The patient will be maintained on the following medications:    Current Facility-Administered Medications:  acetaminophen 650 mg Oral Q6H PRN Marianna Schmidt PA-C   aluminum-magnesium hydroxide-simethicone 30 mL Oral Q4H PRN Marianna Schmidt PA-C   benztropine 1 mg Intramuscular Q6H PRN Jose J Rodriguez, PA-C   benztropine 1 mg Oral Q6H PRN Jose J Rodriguez, PA-C   clonazePAM 1 mg Oral BID Jose J Rodriguez, PA-C   FLUoxetine 40 mg Oral Daily Leon Adler   haloperidol 5 mg Oral Q6H PRN Jose J Rodriguez, PA-C   haloperidol lactate 5 mg Intramuscular Q6H PRN Jose J Rodriguez, PA-C   LORazepam 2 mg Intramuscular Q6H PRN Jose J Rodriguez, PA-C   LORazepam 1 mg Oral Q6H PRN Jose J Rodriguez, PA-C   magnesium hydroxide 30 mL Oral Daily PRN Jose J Rodriguez, PA-C   melatonin 3 mg Oral HS PRN Leann Banister III, DO   nicotine polacrilex 4 mg Oral Q2H PRN Jose J Rodriguez, PA-C   risperiDONE 1 mg Oral Q3H PRN Jose J Rodriguez, PA-C   ziprasidone 20 mg Oral Q4H PRN Jose J Rodriguez, PA-C   ziprasidone 20 mg Intramuscular Q4H PRN Jose J Rodriguez, PA-C   zolpidem 5 mg Oral HS PRN Jose J Rodriguez, PA-C       Risks, benefits and possible side effects of Medications:   Risks, benefits, and possible side effects of medications explained to patient and patient verbalizes understanding

## 2018-11-25 NOTE — PLAN OF CARE
Problem: SELF HARM/SUICIDALITY  Goal: Will have no self-injury during hospital stay  INTERVENTIONS:  - Q 15 MINUTES: Routine safety checks  - Q WAKING SHIFT & PRN: Assess risk to determine if routine checks are adequate to maintain patient safety  - Encourage patient to participate actively in care by formulating a plan to combat response to suicidal ideation, identify supports and resources   Outcome: Progressing      Problem: DEPRESSION  Goal: Will be euthymic at discharge  INTERVENTIONS:  - Administer medication as ordered  - Provide emotional support via 1:1 interaction with staff  - Encourage involvement in milieu/groups/activities  - Monitor for social isolation   Outcome: Progressing      Problem: Ineffective Coping  Goal: Participates in unit activities  Interventions:  - Provide therapeutic environment   - Provide required programming   - Redirect inappropriate behaviors    Outcome: Progressing      Problem: DISCHARGE PLANNING  Goal: Discharge to home or other facility with appropriate resources  INTERVENTIONS:  - Identify barriers to discharge w/patient and caregiver  - Arrange for needed discharge resources and transportation as appropriate  - Identify discharge learning needs (meds, wound care, etc )  - Arrange for interpretive services to assist at discharge as needed  - Refer to Case Management Department for coordinating discharge planning if the patient needs post-hospital services based on physician/advanced practitioner order or complex needs related to functional status, cognitive ability, or social support system   Outcome: Progressing

## 2018-11-25 NOTE — PROGRESS NOTES
Pt calm and pleasant  Reports having anxiety on previous shift and expressed that Ativan was effective  Denies SI, HI, AVH  In day room socializing  Attends and participates in groups  States that visits today went well  No questions/copncerns for this writer

## 2018-11-26 VITALS
SYSTOLIC BLOOD PRESSURE: 127 MMHG | OXYGEN SATURATION: 95 % | HEIGHT: 69 IN | TEMPERATURE: 97 F | HEART RATE: 89 BPM | BODY MASS INDEX: 23.08 KG/M2 | WEIGHT: 155.8 LBS | DIASTOLIC BLOOD PRESSURE: 87 MMHG | RESPIRATION RATE: 18 BRPM

## 2018-11-26 PROCEDURE — 99239 HOSP IP/OBS DSCHRG MGMT >30: CPT | Performed by: PSYCHIATRY & NEUROLOGY

## 2018-11-26 RX ORDER — LANOLIN ALCOHOL/MO/W.PET/CERES
CREAM (GRAM) TOPICAL
Qty: 1 TABLET | Refills: 0
Start: 2018-11-26

## 2018-11-26 RX ORDER — CLONAZEPAM 1 MG/1
1 TABLET ORAL 2 TIMES DAILY
Qty: 1 TABLET | Refills: 0
Start: 2018-11-26 | End: 2019-03-11 | Stop reason: SDUPTHER

## 2018-11-26 RX ADMIN — FLUOXETINE 40 MG: 20 CAPSULE ORAL at 08:23

## 2018-11-26 RX ADMIN — CLONAZEPAM 1 MG: 1 TABLET ORAL at 08:23

## 2018-11-26 NOTE — DISCHARGE SUMMARY
Discharge Summary - [address] 51 y o  male MRN: [id number]  Unit/Bed#: [facility] 258-01 Encounter: [id number]     Admission Date:   Admission Orders     Ordered        11/20/18 1420  Admit Patient to [address] [facility] (use in Admission Navigator for ED to [address] [place] [address])  Once                   Discharge Date: 11/26/18    Attending Psychiatrist: Albania Galeana MD    Reason for Admission/HPI:   History of Present Illness   Patient is a 79-year-old male who presented to [facility] by EMS due to intentional overdose of 12 50mg Trazodone tablets  Medical toxicology was consulted in the ED and was cleared for psychiatric admission  Precipitating events are marital, occupational, and life stressors  Patient is currently being managed for anxiety and depression by his PCP, where he was taking Trazodone for 2 weeks for poor sleep  He reported no relief from Trazodone  Over the last few weeks patient did report increased depression with symptoms of poor sleep, lack of energy, inability to concentrate, guilt, and suicidal thoughts  On admission he was regretful for suicide attempt  Patient also reports generalized anxiety with panic attacks  Patient denied psychosis and did not endorse criteria for mahnaz  Patient denied prior inpatient psychiatric hospitalizations and did report two overdose attempts while in high school, but never sought help  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      Psychosocial Stressors: family, marital and occupational     Hospital Course:   Behavioral Health Medications:   current meds:   Current Facility-Administered Medications   Medication Dose Route Frequency    acetaminophen (TYLENOL) tablet 650 mg  650 mg Oral Q6H PRN    aluminum-magnesium hydroxide-simethicone (MYLANTA) 200-200-20 mg/5 mL oral suspension 30 mL  30 mL Oral Q4H PRN    benztropine (COGENTIN) injection 1 mg  1 mg Intramuscular Q6H PRN    benztropine (COGENTIN) tablet 1 mg  1 mg Oral Q6H PRN    clonazePAM (KlonoPIN) tablet 1 mg  1 mg Oral BID    FLUoxetine (PROzac) capsule 40 mg  40 mg Oral Daily    haloperidol (HALDOL) tablet 5 mg  5 mg Oral Q6H PRN    haloperidol lactate (HALDOL) injection 5 mg  5 mg Intramuscular Q6H PRN    LORazepam (ATIVAN) 2 mg/mL injection 2 mg  2 mg Intramuscular Q6H PRN    LORazepam (ATIVAN) tablet 1 mg  1 mg Oral Q6H PRN    magnesium hydroxide (MILK OF MAGNESIA) 400 mg/5 mL oral suspension 30 mL  30 mL Oral Daily PRN    melatonin tablet 3 mg  3 mg Oral HS PRN    nicotine polacrilex (NICORETTE) gum 4 mg  4 mg Oral Q2H PRN    risperiDONE (RisperDAL M-TABS) dispersible tablet 1 mg  1 mg Oral Q3H PRN    ziprasidone (GEODON) capsule 20 mg  20 mg Oral Q4H PRN    ziprasidone (GEODON) IM injection 20 mg  20 mg Intramuscular Q4H PRN    zolpidem (AMBIEN) tablet 5 mg  5 mg Oral HS PRN       Patient was admitted to [address] [place] [address] inpatient psychiatric unit on voluntary 201 commitment for safety and stabilization  On admission patient was continued on Prozac 40mg QD for depression, Klonopin 1mg BID for anxiety, and started on Ambien 5mg HS for insomnia  Ambien did help with sleep, but caused urinary incontinence overnight  He then agreed to take Melatonin 3mg HS PRN for insomnia  Trazodone was not restarted due to overdose on medication  Restoril was not option with already being prescribed Klonopin  He also required p r n  Ativan 1mg q6h twice for excess anxiety and was explained he would not be getting a script for this medication at discharge  He tolerated medications with no acute side effects  His mood brightened over the course of his treatment, and he was seen in [facility] interacting appropriately with peers  He did not demonstrate dangerous behavior to self or others during his inpatient stay    On day of discharge patient had reduced depression, controllable anxiety, denied psychosis, did not show signs of mahnaz, and denied suicidal/homicidal ideations  Mental Status at time of Discharge:     Appearance:  casually dressed   Behavior:  Cooperative   Speech:  normal pitch and normal volume   Mood:  euthymic   Affect:  mood-congruent   Thought Process:  normal   Thought Content:  normal   Perceptual Disturbances: None   Risk Potential: Denied SI/HI  Potential for aggression no   Sensorium:  person, place and time/date   Cognition:  grossly intact   Consciousness:  alert and awake    Attention: attention span and concentration were age appropriate   Insight:  fair   Judgment: fair   Gait/Station: normal gait/station and normal balance   Motor Activity: no abnormal movements       Discharge Diagnosis:   Severe episode of recurrent major depressive disorder, without psychotic features ([facility] [address] )  Anxiety disorder      Discharge Medications:  See after visit summary for reconciled discharge medications provided to patient and family  Discharge instructions/Information to patient and family:   See after visit summary for information provided to patient and family  Provisions for Follow-Up Care:  See after visit summary for information related to follow-up care and any pertinent home health orders  Discharge Statement   I spent 34 minutes discharging the patient  This time was spent on the day of discharge  I had direct contact with the patient on the day of discharge  On day of discharge patient had mental status exam performed, discharge instructions/medications reviewed, and outpatient planning discussed  He was given no scripts at discharge due to having medications at home  He denied tobacco cessation therapy      Leyla Ha PA-C

## 2018-11-26 NOTE — DISCHARGE INSTRUCTIONS
Depression   WHAT YOU NEED TO KNOW:   Depression is a medical condition that causes feelings of sadness or hopelessness that do not go away  Depression may cause you to lose interest in things you used to enjoy  These feelings may interfere with your daily life  DISCHARGE INSTRUCTIONS:   Call 911 for any of the following:   · You think about harming yourself or someone else  Contact your healthcare provider if:   · Your symptoms do not improve  · You cannot make it to your next appointment  · You have new symptoms  · You have questions or concerns about your condition or care  Medicines:   · Antidepressants  may be given to improve or balance your mood  You may need to take this medicine for several weeks before you begin to feel better  · Take your medicine as directed  Contact your healthcare provider if you think your medicine is not helping or if you have side effects  Tell him of her if you are allergic to any medicine  Keep a list of the medicines, vitamins, and herbs you take  Include the amounts, and when and why you take them  Bring the list or the pill bottles to follow-up visits  Carry your medicine list with you in case of an emergency  Therapy  may be used to treat your depression  A therapist will help you learn to cope with your thoughts and feelings  This can be done alone or in a group  It may also be done with family members or a significant other  Self-care:   · Get regular physical activity  Try to exercise for 30 minutes, 3 to 5 days a week  Work with your healthcare provider to develop an exercise plan that you enjoy  Physical activity may improve your symptoms  · Get enough sleep  Create a routine to help you relax before bed  You can listen to music, read, or do yoga  Try to go to bed and wake up at the same time every day  Sleep is important for emotional health  · Eat a variety of healthy foods from all of the food groups    A healthy meal plan is low in fat, salt, and added sugar  Ask your healthcare provider for more information about a meal plan that is right for you  · Do not drink alcohol or use drugs  Alcohol and drugs can make your symptoms worse  Follow up with your healthcare provider as directed: Your healthcare provider will monitor your progress at follow-up visits  He or she will also monitor your medicine if you take antidepressants  Your healthcare provider will ask if the medicine is helping  Tell him or her about any side effects or problems you may have with your medicine  The type or amount of medicine may need to be changed  Write down your questions so you remember to ask them during your visits  © 2017 [address] Information is for End User's use only and may not be sold, redistributed or otherwise used for commercial purposes  All illustrations and images included in CareNotes® are the copyrighted property of A D A M , Inc  or Heber Waterman  The above information is an  only  It is not intended as medical advice for individual conditions or treatments  Talk to your doctor, nurse or pharmacist before following any medical regimen to see if it is safe and effective for you  Generalized Anxiety Disorder   WHAT YOU NEED TO KNOW:   Generalized anxiety disorder (OSMAN) is a condition that causes you to feel worried or nervous for at least 6 months  The anxiety may be much more severe than the event causing it  You may not be able to do your daily activities because of the anxiety  DISCHARGE INSTRUCTIONS:   Call 911 for any of the following:   · You feel like hurting yourself or someone else  · You have chest pain, tightness, or heaviness that may spread to your shoulders, arms, jaw, neck, or back  Seek care immediately if:   · You feel like fainting or are lightheaded or too dizzy to stand up  Contact your healthcare provider if:   · You have new symptoms since your last visit      · Your anxiety keeps you from doing your daily activities, such as self-care, family, or work  · You have problems that you think may be caused by the medicine you are taking  · Your symptoms are getting worse  · You have questions or concerns about your condition or care  Medicines:   · Medicines  may be given to relieve anxiety and depression and help you feel calm and relaxed  · Take your medicine as directed  Contact your healthcare provider if you think your medicine is not helping or if you have side effects  Tell him or her if you are allergic to any medicine  Keep a list of the medicines, vitamins, and herbs you take  Include the amounts, and when and why you take them  Bring the list or the pill bottles to follow-up visits  Carry your medicine list with you in case of an emergency  Follow up with your healthcare provider as directed:  Write down your questions so you remember to ask them during your visits  Monitor your condition:  Keep a record of the situations that cause your symptoms  Bring the record with you when you see your healthcare provider  Include the following:  · What were you doing when the symptoms started? · Had you eaten anything unusual, or taken a new medicine or herbal supplement? · Were you stressed or upset during the time leading up to the attack? · How often do you have symptoms? How long do they last?    · What were your thoughts and feelings during these situations? · What symptoms did you have? · Did anything help ease or stop the symptoms, such as a relaxation technique? Self-care:   · Limit or do not drink caffeine  Caffeine can increase your heartbeat and make your anxiety symptoms worse  · Limit or do not drink alcohol  Ask your healthcare provider if alcohol is safe for you  You may not be able to drink alcohol if you take certain anxiety or depression medicines  Alcohol can also increase depression   Limit alcohol to 1 drink per day if you are a woman  Limit alcohol to 2 drinks per day if you are a man  A drink of alcohol is 12 ounces of beer, 5 ounces of wine, or 1½ ounces of liquor  · Manage stress  Stress may increase symptoms of OSMAN  Relaxation therapy teaches you how to feel less physical and emotional stress  Deep breathing, muscle relaxation, and music are some forms of relaxation therapy  · Avoid hyperventilating  Some people may hyperventilate during an anxiety attack and not even notice it  Hyperventilation means that your breaths are too fast and shallow  Breathing this way can cause numbness or tingling in your hands and lips  During an anxiety attack, focus on taking very slow, deep breaths  Your healthcare provider may show you how to breathe in and out of a paper bag when you hyperventilate  Never use a plastic bag  © 2017 [address] is for End User's use only and may not be sold, redistributed or otherwise used for commercial purposes  All illustrations and images included in CareNotes® are the copyrighted property of [employer]  or Heber Waterman  The above information is an  only  It is not intended as medical advice for individual conditions or treatments  Talk to your doctor, nurse or pharmacist before following any medical regimen to see if it is safe and effective for you

## 2018-11-26 NOTE — PROGRESS NOTES
Patient pleasant and cooperative  He has concerns with having increased anxiety feeling like panic attaches mid afternoons  Had visit with wife that was pleasant but states she has concerns that he may have panic attach in afternoon when she is not there and he will not take the proper medications or do the right thing in the moment  Patient states he is somewhat anxious about leaving tomorrow with mixed emotions  Happy to get back home and get a "real cup of coffee," and be back in surroundings of home, but anxious about future and handling increased levels of anxiety  States he has a great support system with "[facility]" and his own family  He knows he is to follow up with his family physician but feels he would like to find a therapist to continue "professional help "  Patient in day room most of evening with peers, smiling and social  No other questions or concerns with medications at this time

## 2018-11-26 NOTE — CASE MANAGEMENT
BETO met with pt who reports readiness for dc  Pt does have some anxiety regarding dc but is looking forward to getting back to his family and natural supports  BETO reviewed outpatient appointment with pt's PCP Dr Wendi Winters on 12 4 18 @ 9:15 ([phone]) who is happy to fill pt's psychiatric medications  Pt declined referrals for partial hospitalization, psychiatrist and prefers to use his own natural supports and [employer] family to seek therapy  Pt denies SI/HI and contracts for safety upon dc  BETO outreached to pt's wife Tyrel Koehler ([phone]) who is going to try and persuade pt to be referred to a psychiatrist  Ivan Parker provided the number for [employer] in [place], [place] who are taking new patients  BETO provided good contact number for them ([phone])  Lux Bermeo works with recovering drug addicts and also has some contacts in the community  Swati Lee is in agreement with pt's dc but does have some concerns about pt being on Benzo's as a recovering alcoholic  No further needs anticipated at time of dc

## 2018-11-26 NOTE — DISCHARGE INSTR - LAB
Contact Information: If you have any questions, concerns, pended studies, tests and/or procedures, or emergencies regarding your inpatient behavioral health visit  Please contact [place] [facility] [phone] and ask to speak to a , nurse or physician  A contact is available 24 hours/ 7 days a week at this number  Summary of Procedures Performed During your Stay:  Below is a list of major procedures performed during your hospital stay and a summary of results:  - No major procedures performed  Pending Studies     None        If studies are pending at discharge, follow up with your PCP and/or referring provider

## 2018-11-26 NOTE — PROGRESS NOTES
Pleasant and cooperative  Expressed readiness for discharge  Verbalizes multiple supports including various relatives and peers from [facility]  Spoke about history of alcohol abuse and sobriety of 3 5yrs  Asked how he will manage his anxiety that he has been experiencing daily around noon and he mentioned he believes it may be situational but states he will read from his bible in times of struggle  Denies SI/HI  Improved depression  Compliant with medications, denies side effects  Encouraged compliance with medications and OP appointments  No questions or concerns

## 2018-12-12 ENCOUNTER — OFFICE VISIT (OUTPATIENT)
Dept: FAMILY MEDICINE CLINIC | Facility: CLINIC | Age: 48
End: 2018-12-12
Payer: COMMERCIAL

## 2018-12-12 VITALS
RESPIRATION RATE: 16 BRPM | SYSTOLIC BLOOD PRESSURE: 110 MMHG | HEART RATE: 90 BPM | HEIGHT: 69 IN | BODY MASS INDEX: 25.48 KG/M2 | WEIGHT: 172 LBS | DIASTOLIC BLOOD PRESSURE: 80 MMHG | OXYGEN SATURATION: 98 %

## 2018-12-12 DIAGNOSIS — F33.2 SEVERE EPISODE OF RECURRENT MAJOR DEPRESSIVE DISORDER, WITHOUT PSYCHOTIC FEATURES (HCC): Primary | ICD-10-CM

## 2018-12-12 DIAGNOSIS — F41.1 GENERALIZED ANXIETY DISORDER: ICD-10-CM

## 2018-12-12 PROCEDURE — 99214 OFFICE O/P EST MOD 30 MIN: CPT | Performed by: FAMILY MEDICINE

## 2018-12-12 PROCEDURE — 3008F BODY MASS INDEX DOCD: CPT | Performed by: FAMILY MEDICINE

## 2018-12-12 NOTE — ASSESSMENT & PLAN NOTE
Patient admitted to inpatient psychiatric unit [address]   Was in for 1 week  Did not have outpatient therapy follow-up scheduled  Some changes been made in his work situation and at home to deal with his sons drug relapse issues  I will see him back in 6 weeks and continue on current medications  Will plan closer follow-up as he has not seen in outpatient mental health counseling

## 2018-12-12 NOTE — PROGRESS NOTES
[address]         NAME: Alpesh Castro is a 51 y o  male  : 1970    MRN: [id number]  DATE: 2018  TIME: 8:30 AM    Assessment and Plan   Severe episode of recurrent major depressive disorder, without psychotic features ([facility] ) [F33 2]  1  Severe episode of recurrent major depressive disorder, without psychotic features ([facility] )     2  Generalized anxiety disorder         Severe episode of recurrent major depressive disorder, without psychotic features [facility])  Patient admitted to inpatient psychiatric unit [address]   Was in for 1 week  Did not have outpatient therapy follow-up scheduled  Some changes been made in his work situation and at home to deal with his sons drug relapse issues  I will see him back in 6 weeks and continue on current medications  Will plan closer follow-up as he has not seen in outpatient mental health counseling  Patient Instructions     Patient Instructions   Continue current medications  I should see you back in 4-6 weeks  Total time 30 minutes face-to-face      Chief Complaint     Chief Complaint   Patient presents with    Follow-up     f/u - hospital discharge         History of Present Illness       F/U post discharge for depression- Currently on Prozac and Clonazepam- Also melatonin and L-thianine  Patient also has had some work hours adjustment  Also has contract with son relating to his previous problems to help with home stressors  No outpatient therapy has been scheduled at this point  Will follow up here  Review of Systems   Review of Systems   Constitutional: Negative for fatigue  Respiratory: Negative for cough, shortness of breath and wheezing  Cardiovascular: Negative for chest pain, palpitations and leg swelling  Neurological: Positive for light-headedness  Negative for dizziness and headaches  Psychiatric/Behavioral: Positive for dysphoric mood and sleep disturbance   Negative for agitation and suicidal ideas  The patient is nervous/anxious  Current Medications       Current Outpatient Prescriptions:     clonazePAM (KlonoPIN) 1 mg tablet, Take 1 tablet (1 mg total) by mouth 2 (two) times a day At 9am and 6pm, Disp: 1 tablet, Rfl: 0    FLUoxetine (PROzac) 40 MG capsule, Take 1 capsule (40 mg total) by mouth daily, Disp: 30 capsule, Rfl: 5    melatonin 3 mg, Take 3-6mg by mouth daily at bedtime as needed for insomnia, Disp: 1 tablet, Rfl: 0    Current Allergies     Allergies as of 12/12/2018 - Reviewed 12/12/2018   Allergen Reaction Noted    Erythromycin Diarrhea 12/02/2016            The following portions of the patient's history were reviewed and updated as appropriate: allergies, current medications, past family history, past medical history, past social history, past surgical history and problem list      Past Medical History:   Diagnosis Date    Anxiety     Asthma     Depression     Hyperlipemia     RESOLVED [id number]    Medial meniscus tear     LAST ASSESSED [id number]    Psychiatric disorder     Psychiatric illness     Suicide attempt [facility])        Past Surgical History:   Procedure Laterality Date    ARTHROSCOPY KNEE Right     ONSET 7/3/2014    TONSILLECTOMY AND ADENOIDECTOMY      TOOTH EXTRACTION      WISDOM TEETH       Family History   Problem Relation Age of Onset    Diabetes Mother     Hypertension Mother     Prostate cancer Father     Substance Abuse Neg Hx     Mental illness Neg Hx          Medications have been verified  Objective   /80 (BP Location: Right arm, Patient Position: Sitting, Cuff Size: Standard)   Pulse 90   Resp 16   Ht 5' 9" (1 753 m)   Wt 78 kg (172 lb)   SpO2 98%   BMI 25 40 kg/m²        Physical Exam     Physical Exam   Constitutional: He is oriented to person, place, and time  He appears well-developed and well-nourished  No distress  HENT:   Head: Normocephalic and atraumatic     Eyes: Pupils are equal, round, and reactive to light  Conjunctivae and EOM are normal    Neck: Normal range of motion  No thyromegaly present  Cardiovascular: Normal rate and regular rhythm  No murmur heard  Pulmonary/Chest: Effort normal and breath sounds normal  No respiratory distress  Lymphadenopathy:     He has no cervical adenopathy  Neurological: He is alert and oriented to person, place, and time  Psychiatric: He has a normal mood and affect

## 2019-01-14 ENCOUNTER — OFFICE VISIT (OUTPATIENT)
Dept: FAMILY MEDICINE CLINIC | Facility: CLINIC | Age: 49
End: 2019-01-14
Payer: COMMERCIAL

## 2019-01-14 VITALS
HEART RATE: 84 BPM | OXYGEN SATURATION: 98 % | WEIGHT: 174 LBS | HEIGHT: 69 IN | DIASTOLIC BLOOD PRESSURE: 80 MMHG | BODY MASS INDEX: 25.77 KG/M2 | SYSTOLIC BLOOD PRESSURE: 120 MMHG | RESPIRATION RATE: 14 BRPM

## 2019-01-14 DIAGNOSIS — F41.1 GENERALIZED ANXIETY DISORDER: ICD-10-CM

## 2019-01-14 DIAGNOSIS — F33.2 SEVERE EPISODE OF RECURRENT MAJOR DEPRESSIVE DISORDER, WITHOUT PSYCHOTIC FEATURES (HCC): Primary | ICD-10-CM

## 2019-01-14 PROCEDURE — 99213 OFFICE O/P EST LOW 20 MIN: CPT | Performed by: FAMILY MEDICINE

## 2019-01-14 NOTE — PROGRESS NOTES
[address] [facility]        NAME: Papi Garsia is a 51 y o  male  : 1970    MRN: [id number]  DATE: 2019  TIME: 9:05 AM    Assessment and Plan   Severe episode of recurrent major depressive disorder, without psychotic features ([facility] ) [F33 2]  1  Severe episode of recurrent major depressive disorder, without psychotic features ([facility] )     2  Generalized anxiety disorder         No problem-specific Assessment & Plan notes found for this encounter  Patient Instructions     Patient Instructions   Continue same  recheck 3 months          Chief Complaint     Chief Complaint   Patient presents with    Follow-up     f/u tcm         History of Present Illness       F/U on depression and anxiety- doing well on Prozac 40mg- Klonopin twice alison        Review of Systems   Review of Systems   Constitutional: Negative for activity change, appetite change, chills, diaphoresis, fatigue and fever  HENT: Negative for ear pain, rhinorrhea, sinus pressure and sore throat  Eyes: Negative for discharge, redness and itching  Respiratory: Negative for cough, shortness of breath and wheezing  Cardiovascular: Negative for chest pain and palpitations  Rapid or slow heart rate  Prominent left carotid artery, no bruit   Gastrointestinal: Negative for abdominal pain, diarrhea, nausea and vomiting  Psychiatric/Behavioral: Negative for decreased concentration, dysphoric mood, sleep disturbance and suicidal ideas  The patient is nervous/anxious            Current Medications       Current Outpatient Prescriptions:     clonazePAM (KlonoPIN) 1 mg tablet, Take 1 tablet (1 mg total) by mouth 2 (two) times a day At 9am and 6pm, Disp: 1 tablet, Rfl: 0    FLUoxetine (PROzac) 40 MG capsule, Take 1 capsule (40 mg total) by mouth daily, Disp: 30 capsule, Rfl: 5    melatonin 3 mg, Take 3-6mg by mouth daily at bedtime as needed for insomnia, Disp: 1 tablet, Rfl: 0    Current Allergies Allergies as of 01/14/2019 - Reviewed 01/14/2019   Allergen Reaction Noted    Erythromycin Diarrhea 12/02/2016            The following portions of the patient's history were reviewed and updated as appropriate: allergies, current medications, past family history, past medical history, past social history, past surgical history and problem list      Past Medical History:   Diagnosis Date    Anxiety     Asthma     Depression     Hyperlipemia     RESOLVED [id number]    Medial meniscus tear     LAST ASSESSED [id number]    Psychiatric disorder     Psychiatric illness     [facility] attempt [facility])        Past Surgical History:   Procedure Laterality Date    ARTHROSCOPY KNEE Right     ONSET 7/3/2014    TONSILLECTOMY AND ADENOIDECTOMY      TOOTH EXTRACTION      WISDOM TEETH       Family History   Problem Relation Age of Onset    Diabetes Mother     Hypertension Mother     Prostate cancer Father     Substance Abuse Neg Hx     Mental illness Neg Hx          Medications have been verified          Objective   /80 (BP Location: Right arm, Patient Position: Sitting, Cuff Size: Large)   Pulse 84   Resp 14   Ht 5' 9" (1 753 m)   Wt 78 9 kg (174 lb)   SpO2 98%   BMI 25 70 kg/m²        Physical Exam     Physical Exam

## 2019-02-15 ENCOUNTER — OFFICE VISIT (OUTPATIENT)
Dept: FAMILY MEDICINE CLINIC | Facility: CLINIC | Age: 49
End: 2019-02-15
Payer: COMMERCIAL

## 2019-02-15 VITALS
OXYGEN SATURATION: 98 % | TEMPERATURE: 98.5 F | HEIGHT: 69 IN | HEART RATE: 101 BPM | SYSTOLIC BLOOD PRESSURE: 118 MMHG | BODY MASS INDEX: 25.77 KG/M2 | DIASTOLIC BLOOD PRESSURE: 78 MMHG | WEIGHT: 174 LBS

## 2019-02-15 DIAGNOSIS — B34.9 ACUTE VIRAL SYNDROME: Primary | ICD-10-CM

## 2019-02-15 PROCEDURE — 99213 OFFICE O/P EST LOW 20 MIN: CPT | Performed by: NURSE PRACTITIONER

## 2019-02-15 RX ORDER — OSELTAMIVIR PHOSPHATE 75 MG/1
75 CAPSULE ORAL 2 TIMES DAILY
Qty: 10 CAPSULE | Refills: 0 | Status: SHIPPED | OUTPATIENT
Start: 2019-02-15 | End: 2019-02-20

## 2019-02-15 NOTE — PATIENT INSTRUCTIONS
Tamiflu as directed  Increase fluids and rest  OTC cough/cold medications  Discussed viral vs bacterial infection  School note given  Return if symptoms worsen or for problems/concerns

## 2019-02-15 NOTE — PROGRESS NOTES
[facility]        NAME: Amina Parry is a 51 y o  male  : 1970    MRN: [id number]  DATE: February 15, 2019  TIME: 4:18 PM    Assessment and Plan   Acute viral syndrome [B34 9]  1  Acute viral syndrome  oseltamivir (TAMIFLU) 75 mg capsule         Patient Instructions     Patient Instructions   Tamiflu as directed  Increase fluids and rest  OTC cough/cold medications  Discussed viral vs bacterial infection  School note given  Return if symptoms worsen or for problems/concerns          Chief Complaint     Chief Complaint   Patient presents with    Cold Like Symptoms     high fever, body aches, vomitting, fatigue         History of Present Illness       Started 3 days ago with fever, cough  No has vomiting, diarrhea, body aches  Feeling very fatigue  Taking Tylenol and Motrin for fever  Review of Systems   Review of Systems   Constitutional: Positive for activity change, appetite change, chills, diaphoresis, fatigue and fever  HENT: Positive for congestion, ear pain, postnasal drip, rhinorrhea and sinus pressure  Negative for sore throat  Eyes: Negative for pain, discharge and redness  Respiratory: Positive for cough, chest tightness, shortness of breath and wheezing  Cardiovascular: Negative for chest pain  Gastrointestinal: Positive for diarrhea, nausea and vomiting  Negative for constipation  Musculoskeletal: Positive for myalgias  Skin: Negative for rash  Neurological: Positive for headaches  Negative for dizziness           Current Medications       Current Outpatient Medications:     clonazePAM (KlonoPIN) 1 mg tablet, Take 1 tablet (1 mg total) by mouth 2 (two) times a day At 9am and 6pm, Disp: 1 tablet, Rfl: 0    FLUoxetine (PROzac) 40 MG capsule, Take 1 capsule (40 mg total) by mouth daily, Disp: 30 capsule, Rfl: 5    melatonin 3 mg, Take 3-6mg by mouth daily at bedtime as needed for insomnia, Disp: 1 tablet, Rfl: 0    oseltamivir (TAMIFLU) 75 mg capsule, Take 1 capsule (75 mg total) by mouth 2 (two) times a day for 5 days, Disp: 10 capsule, Rfl: 0    Current Allergies     Allergies as of 02/15/2019 - Reviewed 02/15/2019   Allergen Reaction Noted    Erythromycin Diarrhea 12/02/2016            The following portions of the patient's history were reviewed and updated as appropriate: allergies, current medications, past family history, past medical history, past social history, past surgical history and problem list      Past Medical History:   Diagnosis Date    Anxiety     Asthma     Depression     Hyperlipemia     RESOLVED [id number]    Medial meniscus tear     LAST ASSESSED [id number]    Psychiatric disorder     Psychiatric illness     Suicide attempt [facility])        Past Surgical History:   Procedure Laterality Date    ARTHROSCOPY KNEE Right     ONSET 7/3/2014    TONSILLECTOMY AND ADENOIDECTOMY      TOOTH EXTRACTION      WISDOM TEETH       Family History   Problem Relation Age of Onset    Diabetes Mother     Hypertension Mother     Prostate cancer Father     Substance Abuse Neg Hx     Mental illness Neg Hx          Medications have been verified  Objective   /78   Pulse 101   Temp 98 5 °F (36 9 °C)   Ht 5' 9" (1 753 m)   Wt 78 9 kg (174 lb)   SpO2 98%   BMI 25 70 kg/m²        Physical Exam     Physical Exam   Constitutional: He is oriented to person, place, and time  He appears well-developed and well-nourished  He has a sickly appearance  No distress  HENT:   Head: Normocephalic and atraumatic  Right Ear: Tympanic membrane, external ear and ear canal normal    Left Ear: Tympanic membrane, external ear and ear canal normal    Nose: No rhinorrhea  No epistaxis  Mouth/Throat: Uvula is midline, oropharynx is clear and moist and mucous membranes are normal    Cardiovascular: Normal rate, regular rhythm and normal heart sounds  Exam reveals no gallop and no friction rub  No murmur heard    Pulmonary/Chest: Effort normal and breath sounds normal  No respiratory distress  He has no wheezes  He has no rales  Abdominal: He exhibits no distension  There is no tenderness  Musculoskeletal: Normal range of motion  Lymphadenopathy:        Head (right side): No submandibular adenopathy present  Head (left side): No submandibular adenopathy present  Neurological: He is alert and oriented to person, place, and time  Skin: He is not diaphoretic  Psychiatric: He has a normal mood and affect  His behavior is normal  Judgment and thought content normal    Nursing note and vitals reviewed

## 2019-03-11 ENCOUNTER — OFFICE VISIT (OUTPATIENT)
Dept: FAMILY MEDICINE CLINIC | Facility: CLINIC | Age: 49
End: 2019-03-11
Payer: COMMERCIAL

## 2019-03-11 VITALS
WEIGHT: 168 LBS | BODY MASS INDEX: 24.88 KG/M2 | HEIGHT: 69 IN | DIASTOLIC BLOOD PRESSURE: 70 MMHG | OXYGEN SATURATION: 98 % | RESPIRATION RATE: 14 BRPM | SYSTOLIC BLOOD PRESSURE: 100 MMHG | HEART RATE: 90 BPM

## 2019-03-11 DIAGNOSIS — Z00.00 ENCOUNTER FOR WELLNESS EXAMINATION IN ADULT: ICD-10-CM

## 2019-03-11 DIAGNOSIS — Z80.0 FAMILY HISTORY OF COLON CANCER IN FATHER: ICD-10-CM

## 2019-03-11 DIAGNOSIS — F41.1 GENERALIZED ANXIETY DISORDER: ICD-10-CM

## 2019-03-11 DIAGNOSIS — R73.01 IMPAIRED FASTING BLOOD SUGAR: ICD-10-CM

## 2019-03-11 DIAGNOSIS — J45.20 MILD INTERMITTENT ASTHMA WITHOUT COMPLICATION: ICD-10-CM

## 2019-03-11 DIAGNOSIS — F32.1 MODERATE SINGLE CURRENT EPISODE OF MAJOR DEPRESSIVE DISORDER (HCC): Primary | ICD-10-CM

## 2019-03-11 DIAGNOSIS — Z80.42 FAMILY HISTORY OF PROSTATE CANCER IN FATHER: ICD-10-CM

## 2019-03-11 PROBLEM — F33.2 SEVERE EPISODE OF RECURRENT MAJOR DEPRESSIVE DISORDER, WITHOUT PSYCHOTIC FEATURES (HCC): Status: RESOLVED | Noted: 2018-03-19 | Resolved: 2019-03-11

## 2019-03-11 PROCEDURE — 99396 PREV VISIT EST AGE 40-64: CPT | Performed by: FAMILY MEDICINE

## 2019-03-11 PROCEDURE — 3008F BODY MASS INDEX DOCD: CPT | Performed by: FAMILY MEDICINE

## 2019-03-11 PROCEDURE — 99214 OFFICE O/P EST MOD 30 MIN: CPT | Performed by: FAMILY MEDICINE

## 2019-03-11 RX ORDER — CLONAZEPAM 1 MG/1
1 TABLET ORAL 2 TIMES DAILY
Qty: 1 TABLET | Refills: 0
Start: 2019-03-11 | End: 2019-03-11 | Stop reason: SDUPTHER

## 2019-03-11 RX ORDER — FLUOXETINE HYDROCHLORIDE 40 MG/1
40 CAPSULE ORAL DAILY
Qty: 30 CAPSULE | Refills: 5 | Status: SHIPPED | OUTPATIENT
Start: 2019-03-11 | End: 2019-10-11 | Stop reason: SDUPTHER

## 2019-03-11 RX ORDER — CLONAZEPAM 1 MG/1
1 TABLET ORAL 2 TIMES DAILY
Qty: 60 TABLET | Refills: 2 | Status: SHIPPED | OUTPATIENT
Start: 2019-03-11 | End: 2019-06-16 | Stop reason: SDUPTHER

## 2019-03-11 RX ORDER — ARIPIPRAZOLE 5 MG/1
5 TABLET ORAL DAILY
Qty: 30 TABLET | Refills: 2 | Status: SHIPPED | OUTPATIENT
Start: 2019-03-11 | End: 2019-06-03 | Stop reason: SDUPTHER

## 2019-03-11 NOTE — ASSESSMENT & PLAN NOTE
Will continue Prozac at current dose  Add Abilify 5 mg  Previous side effects with trazodone and Wellbutrin  Recheck in 5-6 weeks  Patient to consider outpatient counseling

## 2019-03-11 NOTE — ASSESSMENT & PLAN NOTE
Blood sugars have been in the 125-130 range  A1cs have always been in the nondiabetic range  Will check prior to next visit

## 2019-03-11 NOTE — PROGRESS NOTES
HPI:  Quintin Dent is a 51 y o  male here for his yearly health maintenance exam    Patient Active Problem List   Diagnosis    Mild intermittent asthma without complication    Impaired fasting blood sugar    Cigarette nicotine dependence without complication    Anxiety disorder    Severe episode of recurrent major depressive disorder, without psychotic features [facility]    Family history of colon cancer in father    Family history of prostate cancer in father     Past Medical History:   Diagnosis Date    Anxiety     Asthma     Depression     Hyperlipemia     RESOLVED [id number]    Medial meniscus tear     LAST ASSESSED [id number]    Psychiatric disorder     Psychiatric illness     Suicide attempt ([facility] )        1  Advanced Directive: no     2  Durable Power of  for Healthcare: yes     3  Social History:               Marital History:               Work Status: full              Drug and alcohol History: no              Alcohol Use: recovery     4  General Health: good              Regular Dental Visits:no              Vision problems:glasses              Hearing Loss:no              Immunizations up to date: yes                 Lifestyle:                           Healthy Diet:no                          Tobacco Use:current                          Regular exercise:yes                          Weight concerns:no                          Drug abuse: no     5       PHQ-9 Depression Screening    PHQ-9:    Frequency of the following problems over the past two weeks:                Current Outpatient Medications   Medication Sig Dispense Refill    clonazePAM (KlonoPIN) 1 mg tablet Take 1 tablet (1 mg total) by mouth 2 (two) times a day At 9am and 6pm 1 tablet 0    FLUoxetine (PROzac) 40 MG capsule Take 1 capsule (40 mg total) by mouth daily 30 capsule 5    melatonin 3 mg Take 3-6mg by mouth daily at bedtime as needed for insomnia 1 tablet 0     No current facility-administered medications for this visit  Allergies   Allergen Reactions    Erythromycin Diarrhea     Other reaction(s): Abdominal pain     Immunization History   Administered Date(s) Administered    Influenza Quadrivalent Preservative Free 3 years and older IM 01/09/2017    Tdap 03/19/2018       Patient Care Team:  Henri Arias MD as PCP - General (Family Medicine)  Cachorro Aguirre MD    Review of Systems      Physical Exam :  Physical Exam   Constitutional: He is oriented to person, place, and time  He appears well-developed and well-nourished  No distress  HENT:   Head: Normocephalic and atraumatic  Right Ear: Tympanic membrane, external ear and ear canal normal    Left Ear: Tympanic membrane, external ear and ear canal normal    Nose: No mucosal edema or rhinorrhea  Right sinus exhibits no maxillary sinus tenderness  Left sinus exhibits no maxillary sinus tenderness  Mouth/Throat: Uvula is midline  Mucous membranes are not pale and not dry  Normal dentition  No oropharyngeal exudate  Eyes: Pupils are equal, round, and reactive to light  EOM are normal  Right eye exhibits no discharge  Left eye exhibits no discharge  Neck: Normal range of motion  Neck supple  No thyromegaly present  Cardiovascular: Normal rate, regular rhythm, normal heart sounds and intact distal pulses  No murmur heard  Pulmonary/Chest: Effort normal and breath sounds normal  No respiratory distress  He has no wheezes  He has no rales  Abdominal: Soft  He exhibits no distension  There is no tenderness  There is no rebound  Musculoskeletal: Normal range of motion  He exhibits no edema or tenderness  Lymphadenopathy:     He has no cervical adenopathy  Neurological: He is alert and oriented to person, place, and time  No cranial nerve deficit  Skin: He is not diaphoretic  Psychiatric: He has a normal mood and affect   His behavior is normal          Reviewed Updated [facility] Prior Wellness Visits:   Last Health Maintenance visit information was reviewed, patient interviewed , no change since last HM visit yes  Last  visit information was reviewed, patient interviewed and updates made to the record today yes    Assessment and Plan:  1  Encounter for wellness examination in adult     2  Family history of colon cancer in father     3  Family history of prostate cancer in father  PSA Total (Reflex To Free)    PSA Total (Reflex To Free)   4   Impaired fasting blood sugar  Basic metabolic panel    Hemoglobin E3I    Basic metabolic panel    Hemoglobin A1C       Health Maintenance Due   Topic Date Due    BMI: Followup Plan  05/03/1987

## 2019-03-11 NOTE — PATIENT INSTRUCTIONS
Health maintenance-you should have your colonoscopy at age 47  Metabolic screens are current  Review your advanced directive living will status  PSA will be ordered due to family history of prostate cancer  Keep working to decrease cigarette smoking  Medical management-trial of adding Abilify 5 mg to current medications  Continue the other medications  Recheck in 4-5 weeks  Consider counseling

## 2019-03-11 NOTE — PROGRESS NOTES
Subjective:   Chief Complaint   Patient presents with    Follow-up     discuss meds    Annual Exam             Patient ID: Essence Lamb is a 51 y o  male  Patient comes earlier for re-evaluation have his depression anxiety  He notes some increased anxiety and increased depression  He is on fluoxetine 40 mg and Klonopin 1 mg twice daily  Previously had been on Wellbutrin and trazodone with side effects  He has not had counseling in the past   He did have some counseling as an inpatient previously  Asthma has been well control currently  History of impaired fasting glucose  Will be due for labs with A1c  The following portions of the patient's history were reviewed and updated as appropriate: allergies, current medications, past family history, past medical history, past social history, past surgical history and problem list     Review of Systems   Constitutional: Negative for appetite change, diaphoresis, fatigue and fever  Respiratory: Negative for cough, shortness of breath and wheezing  Cardiovascular: Negative for chest pain, palpitations and leg swelling  Gastrointestinal: Negative for abdominal pain, blood in stool, diarrhea and nausea  Psychiatric/Behavioral: Positive for dysphoric mood and sleep disturbance  Negative for agitation, decreased concentration, self-injury and suicidal ideas  The patient is nervous/anxious  Objective:  Vitals:    03/11/19 0856   BP: 100/70   BP Location: Right arm   Patient Position: Sitting   Cuff Size: Standard   Pulse: 90   Resp: 14   SpO2: 98%   Weight: 76 2 kg (168 lb)   Height: 5' 9" (1 753 m)      Physical Exam   Constitutional: He is oriented to person, place, and time  He appears well-developed and well-nourished  No distress  HENT:   Head: Normocephalic and atraumatic  Right Ear: Tympanic membrane and external ear normal  No drainage  Left Ear: Tympanic membrane normal  No drainage  Mouth/Throat: No oropharyngeal exudate  Eyes: Conjunctivae and EOM are normal  Right eye exhibits no discharge  Left eye exhibits no discharge  Neck: Normal range of motion  Neck supple  No thyromegaly present  Cardiovascular: Normal rate, regular rhythm and normal heart sounds  Pulmonary/Chest: Effort normal  No respiratory distress  He has no wheezes  He has no rales  Abdominal: Soft  Lymphadenopathy:     He has no cervical adenopathy  Neurological: He is alert and oriented to person, place, and time  Psychiatric: He has a normal mood and affect  His behavior is normal          Assessment/Plan: Moderate single current episode of major depressive disorder ([address] )  Will continue Prozac at current dose  Add Abilify 5 mg  Previous side effects with trazodone and Wellbutrin  Recheck in 5-6 weeks  Patient to consider outpatient counseling  Mild intermittent asthma without complication  Doing well with current therapy  Minimal use of rescue inhaler  Impaired fasting blood sugar  Blood sugars have been in the 125-130 range  A1cs have always been in the nondiabetic range  Will check prior to next visit  Anxiety disorder  Will continue current dose of Klonopin  Will re-evaluate in 6 weeks  Diagnoses and all orders for this visit:    Encounter for wellness examination in adult    Family history of colon cancer in father    Family history of prostate cancer in father  -     PSA Total (Reflex To Free); Future  -     PSA Total (Reflex To Free)    Impaired fasting blood sugar  -     Basic metabolic panel; Future  -     Hemoglobin A1C; Future  -     Basic metabolic panel  -     Hemoglobin A1C    Generalized anxiety disorder  -     FLUoxetine (PROzac) 40 MG capsule; Take 1 capsule (40 mg total) by mouth daily  -     Discontinue: clonazePAM (KlonoPIN) 1 mg tablet; Take 1 tablet (1 mg total) by mouth 2 (two) times a day At 9am and 6pm  -     ARIPiprazole (ABILIFY) 5 mg tablet;  Take 1 tablet (5 mg total) by mouth daily  - clonazePAM (KlonoPIN) 1 mg tablet; Take 1 tablet (1 mg total) by mouth 2 (two) times a day At 9am and 6pm    Mild intermittent asthma without complication    Moderate single current episode of major depressive disorder (HCC)  -     FLUoxetine (PROzac) 40 MG capsule; Take 1 capsule (40 mg total) by mouth daily  -     ARIPiprazole (ABILIFY) 5 mg tablet;  Take 1 tablet (5 mg total) by mouth daily    Anxiety

## 2019-04-08 ENCOUNTER — TELEPHONE (OUTPATIENT)
Dept: FAMILY MEDICINE CLINIC | Facility: CLINIC | Age: 49
End: 2019-04-08

## 2019-04-08 ENCOUNTER — OFFICE VISIT (OUTPATIENT)
Dept: FAMILY MEDICINE CLINIC | Facility: CLINIC | Age: 49
End: 2019-04-08
Payer: COMMERCIAL

## 2019-04-08 VITALS
DIASTOLIC BLOOD PRESSURE: 84 MMHG | SYSTOLIC BLOOD PRESSURE: 124 MMHG | BODY MASS INDEX: 26.51 KG/M2 | HEIGHT: 69 IN | OXYGEN SATURATION: 98 % | HEART RATE: 87 BPM | WEIGHT: 179 LBS

## 2019-04-08 DIAGNOSIS — S89.91XA KNEE INJURY, RIGHT, INITIAL ENCOUNTER: Primary | ICD-10-CM

## 2019-04-08 PROCEDURE — 99213 OFFICE O/P EST LOW 20 MIN: CPT | Performed by: FAMILY MEDICINE

## 2019-04-08 PROCEDURE — 3008F BODY MASS INDEX DOCD: CPT | Performed by: FAMILY MEDICINE

## 2019-04-14 LAB
BUN SERPL-MCNC: 11 MG/DL (ref 6–24)
BUN/CREAT SERPL: 13 (ref 9–20)
CALCIUM SERPL-MCNC: 9.3 MG/DL (ref 8.7–10.2)
CHLORIDE SERPL-SCNC: 103 MMOL/L (ref 96–106)
CO2 SERPL-SCNC: 23 MMOL/L (ref 20–29)
CREAT SERPL-MCNC: 0.86 MG/DL (ref 0.76–1.27)
EST. AVERAGE GLUCOSE BLD GHB EST-MCNC: 126 MG/DL
GLUCOSE SERPL-MCNC: 140 MG/DL (ref 65–99)
HBA1C MFR BLD: 6 % (ref 4.8–5.6)
POTASSIUM SERPL-SCNC: 4.3 MMOL/L (ref 3.5–5.2)
PSA SERPL-MCNC: 0.9 NG/ML (ref 0–4)
SL AMB EGFR AFRICAN AMERICAN: 118 ML/MIN/1.73
SL AMB EGFR NON AFRICAN AMERICAN: 102 ML/MIN/1.73
SL AMB REFLEX CRITERIA: NORMAL
SODIUM SERPL-SCNC: 139 MMOL/L (ref 134–144)

## 2019-04-15 ENCOUNTER — OFFICE VISIT (OUTPATIENT)
Dept: FAMILY MEDICINE CLINIC | Facility: CLINIC | Age: 49
End: 2019-04-15
Payer: COMMERCIAL

## 2019-04-15 VITALS
HEART RATE: 77 BPM | DIASTOLIC BLOOD PRESSURE: 84 MMHG | HEIGHT: 69 IN | SYSTOLIC BLOOD PRESSURE: 126 MMHG | WEIGHT: 178 LBS | OXYGEN SATURATION: 98 % | BODY MASS INDEX: 26.36 KG/M2

## 2019-04-15 DIAGNOSIS — J45.20 MILD INTERMITTENT ASTHMA WITHOUT COMPLICATION: ICD-10-CM

## 2019-04-15 DIAGNOSIS — F32.1 MODERATE SINGLE CURRENT EPISODE OF MAJOR DEPRESSIVE DISORDER (HCC): Primary | ICD-10-CM

## 2019-04-15 DIAGNOSIS — R73.01 IMPAIRED FASTING BLOOD SUGAR: ICD-10-CM

## 2019-04-15 DIAGNOSIS — F41.1 GENERALIZED ANXIETY DISORDER: ICD-10-CM

## 2019-04-15 DIAGNOSIS — S89.91XD INJURY OF RIGHT KNEE, SUBSEQUENT ENCOUNTER: ICD-10-CM

## 2019-04-15 PROCEDURE — 99214 OFFICE O/P EST MOD 30 MIN: CPT | Performed by: FAMILY MEDICINE

## 2019-04-17 ENCOUNTER — OFFICE VISIT (OUTPATIENT)
Dept: FAMILY MEDICINE CLINIC | Facility: CLINIC | Age: 49
End: 2019-04-17
Payer: COMMERCIAL

## 2019-04-17 ENCOUNTER — TELEPHONE (OUTPATIENT)
Dept: FAMILY MEDICINE CLINIC | Facility: CLINIC | Age: 49
End: 2019-04-17

## 2019-04-17 VITALS
BODY MASS INDEX: 26.36 KG/M2 | SYSTOLIC BLOOD PRESSURE: 124 MMHG | DIASTOLIC BLOOD PRESSURE: 82 MMHG | HEIGHT: 69 IN | TEMPERATURE: 98.7 F | OXYGEN SATURATION: 98 % | HEART RATE: 98 BPM | WEIGHT: 178 LBS

## 2019-04-17 DIAGNOSIS — K57.92 DIVERTICULITIS: Primary | ICD-10-CM

## 2019-04-17 PROCEDURE — 99214 OFFICE O/P EST MOD 30 MIN: CPT | Performed by: FAMILY MEDICINE

## 2019-04-17 RX ORDER — METRONIDAZOLE 500 MG/1
500 TABLET ORAL EVERY 8 HOURS SCHEDULED
Qty: 30 TABLET | Refills: 0 | Status: SHIPPED | OUTPATIENT
Start: 2019-04-17 | End: 2019-04-27

## 2019-04-17 RX ORDER — CIPROFLOXACIN 500 MG/1
500 TABLET, FILM COATED ORAL EVERY 12 HOURS SCHEDULED
Qty: 20 TABLET | Refills: 0 | Status: SHIPPED | OUTPATIENT
Start: 2019-04-17 | End: 2019-04-27

## 2019-04-22 ENCOUNTER — OFFICE VISIT (OUTPATIENT)
Dept: GASTROENTEROLOGY | Facility: CLINIC | Age: 49
End: 2019-04-22
Payer: COMMERCIAL

## 2019-04-22 VITALS
WEIGHT: 181 LBS | SYSTOLIC BLOOD PRESSURE: 130 MMHG | BODY MASS INDEX: 25.91 KG/M2 | DIASTOLIC BLOOD PRESSURE: 84 MMHG | HEIGHT: 70 IN | HEART RATE: 89 BPM

## 2019-04-22 DIAGNOSIS — K57.92 DIVERTICULITIS: ICD-10-CM

## 2019-04-22 DIAGNOSIS — R19.7 DIARRHEA, UNSPECIFIED TYPE: Primary | ICD-10-CM

## 2019-04-22 DIAGNOSIS — K57.92 DIVERTICULITIS: Primary | ICD-10-CM

## 2019-04-22 PROCEDURE — 99204 OFFICE O/P NEW MOD 45 MIN: CPT | Performed by: NURSE PRACTITIONER

## 2019-05-06 ENCOUNTER — TELEPHONE (OUTPATIENT)
Dept: FAMILY MEDICINE CLINIC | Facility: CLINIC | Age: 49
End: 2019-05-06

## 2019-05-06 ENCOUNTER — OFFICE VISIT (OUTPATIENT)
Dept: FAMILY MEDICINE CLINIC | Facility: CLINIC | Age: 49
End: 2019-05-06
Payer: COMMERCIAL

## 2019-05-06 VITALS
HEIGHT: 70 IN | HEART RATE: 82 BPM | TEMPERATURE: 97.3 F | WEIGHT: 181 LBS | OXYGEN SATURATION: 98 % | BODY MASS INDEX: 25.91 KG/M2 | DIASTOLIC BLOOD PRESSURE: 84 MMHG | SYSTOLIC BLOOD PRESSURE: 120 MMHG

## 2019-05-06 DIAGNOSIS — G47.33 OSA (OBSTRUCTIVE SLEEP APNEA): Primary | ICD-10-CM

## 2019-05-06 PROCEDURE — 99213 OFFICE O/P EST LOW 20 MIN: CPT | Performed by: FAMILY MEDICINE

## 2019-05-29 ENCOUNTER — TELEPHONE (OUTPATIENT)
Dept: GASTROENTEROLOGY | Facility: CLINIC | Age: 49
End: 2019-05-29

## 2019-06-03 ENCOUNTER — TELEPHONE (OUTPATIENT)
Dept: FAMILY MEDICINE CLINIC | Facility: CLINIC | Age: 49
End: 2019-06-03

## 2019-06-03 DIAGNOSIS — F32.1 MODERATE SINGLE CURRENT EPISODE OF MAJOR DEPRESSIVE DISORDER (HCC): ICD-10-CM

## 2019-06-03 DIAGNOSIS — F41.1 GENERALIZED ANXIETY DISORDER: ICD-10-CM

## 2019-06-03 RX ORDER — ARIPIPRAZOLE 5 MG/1
TABLET ORAL
Qty: 90 TABLET | Refills: 0 | Status: SHIPPED | OUTPATIENT
Start: 2019-06-03 | End: 2019-09-04 | Stop reason: SDUPTHER

## 2019-06-11 ENCOUNTER — TELEPHONE (OUTPATIENT)
Dept: FAMILY MEDICINE CLINIC | Facility: CLINIC | Age: 49
End: 2019-06-11

## 2019-06-16 DIAGNOSIS — F41.1 GENERALIZED ANXIETY DISORDER: ICD-10-CM

## 2019-06-17 ENCOUNTER — CONSULT (OUTPATIENT)
Dept: SURGERY | Facility: HOSPITAL | Age: 49
End: 2019-06-17
Payer: COMMERCIAL

## 2019-06-17 VITALS
RESPIRATION RATE: 16 BRPM | WEIGHT: 184.8 LBS | HEIGHT: 70 IN | TEMPERATURE: 99.2 F | HEART RATE: 73 BPM | BODY MASS INDEX: 26.45 KG/M2 | DIASTOLIC BLOOD PRESSURE: 81 MMHG | SYSTOLIC BLOOD PRESSURE: 117 MMHG

## 2019-06-17 DIAGNOSIS — D18.01 HEMANGIOMA OF SKIN: ICD-10-CM

## 2019-06-17 DIAGNOSIS — L21.9 SEBORRHEA: ICD-10-CM

## 2019-06-17 DIAGNOSIS — K40.20 BILATERAL INGUINAL HERNIA WITHOUT OBSTRUCTION OR GANGRENE, RECURRENCE NOT SPECIFIED: ICD-10-CM

## 2019-06-17 DIAGNOSIS — K57.32 DIVERTICULITIS OF SIGMOID COLON: Primary | ICD-10-CM

## 2019-06-17 DIAGNOSIS — D22.9 MULTIPLE PIGMENTED NEVI: ICD-10-CM

## 2019-06-17 DIAGNOSIS — K42.9 UMBILICAL HERNIA WITHOUT OBSTRUCTION AND WITHOUT GANGRENE: ICD-10-CM

## 2019-06-17 PROCEDURE — 99244 OFF/OP CNSLTJ NEW/EST MOD 40: CPT | Performed by: SURGERY

## 2019-06-17 RX ORDER — CLONAZEPAM 1 MG/1
TABLET ORAL
Qty: 60 TABLET | Refills: 0 | Status: SHIPPED | OUTPATIENT
Start: 2019-06-17 | End: 2019-07-16 | Stop reason: SDUPTHER

## 2019-06-17 NOTE — H&P (VIEW-ONLY)
Assessment/Plan: Solis Chow is a 47year old male who presents today, per referral by Dr Cherie Linda, for consultation regarding diverticulitis  He had a colonoscopy on 6/10/19 that showed polyps of sigmoid colon, moderate diverticulitis of sigmoid colon, and mild diverticulitis of the hepatic flexure, ascending colon, and cecum  Pathology showed the polyps of sigmoid colon were filiform hyperplastic polyps with no dysplasia or malignancy  Discussed risks, benefits, and alternatives to laparoscopic colon resection  Explained the surgery as well as pre- and post-procedural protocols and restrictions  Lifting restrictions of no greater than 15 pounds and no strenuous activity for 2 weeks after surgery  No heavy lifting of greater than 25 pounds for weeks 3 and 4  He will be on a low fiber diet for 2 weeks after the procedure  After 2 weeks, he should move to a high fiber diet  He understands that the procedure would reduce his risk of future diverticular attacks in the sigmoid colon, but he may still have future attacks in other parts of his colon  As his attacks have all been uncomplicated, he does not need to pursue surgery at this time  If he has an attack of complicated diverticulitis, he should consider having surgery  He would like to have the procedure  The office will schedule him for the procedure  He knows to contact the office if any questions or concerns arise  Hernias- Physical exam revealed a small umbilical hernia and small bilateral inguinal hernias  Discussed risks, benefits, and alternatives to laparoscopic repair of these hernias  Explained the surgery as well as pre- and post-procedural protocols and restrictions  If his hernias are reducible and asymptomatic, he may monitor them at this time  If he notices pain or a non-reducible bulge, he should contact the office  Skin- Multiple pigmented nevi, scattered hemangiomas, and seborrhea of neck and back   Recommend he has his PCP monitor these annually  No problem-specific Assessment & Plan notes found for this encounter  There are no diagnoses linked to this encounter  Subjective:      Patient ID: Paola Mercer is a 47 y o  male  Saintclair Destine is a 47year old male who presents today, per referral by Dr Shelby Willard, for consultation regarding diverticulitis  He had a diverticular attack in November 2018 and had a CT scan from 11/14/18 showed mild uncomplicated acute sigmoid diverticulitis  He has had 5 or 6 diverticular attacks and has been managed as an outpatient for all of his attacks because his wife says he refuses to be admitted for diverticulitis  He says his attacks have been non-complicated sigmoid diverticulitis  He had a colonoscopy 9 years ago that showed diverticulitis and he had another colonoscopy on 6/10/19 that showed diverticulitis and polyps  The following portions of the patient's history were reviewed and updated as appropriate: allergies, current medications, past family history, past medical history, past social history, past surgical history and problem list     Review of Systems   Constitutional: Negative  HENT: Negative  Eyes: Negative  Respiratory: Negative  Cardiovascular: Negative  Gastrointestinal: Negative  Endocrine: Negative  Genitourinary: Negative  Musculoskeletal: Negative  Skin: Negative  Allergic/Immunologic: Negative  Neurological: Negative  Hematological: Negative  Psychiatric/Behavioral: Negative  All other systems reviewed and are negative  Objective:      /81   Pulse 73   Temp 99 2 °F (37 3 °C) (Tympanic)   Resp 16   Ht 5' 10" (1 778 m)   Wt 83 8 kg (184 lb 12 8 oz)   BMI 26 52 kg/m²          Physical Exam   Constitutional: He is oriented to person, place, and time  He appears well-developed and well-nourished  No distress  HENT:   Head: Normocephalic and atraumatic     Right Ear: External ear normal    Left Ear: External ear normal    Nose: Nose normal    Mouth/Throat: Oropharynx is clear and moist  No oropharyngeal exudate  Eyes: Conjunctivae and EOM are normal  Right eye exhibits no discharge  Left eye exhibits no discharge  No scleral icterus  Neck: Normal range of motion  Neck supple  No JVD present  No tracheal deviation present  No thyromegaly present  Cardiovascular: Normal rate, regular rhythm, normal heart sounds and intact distal pulses  Exam reveals no gallop and no friction rub  No murmur heard  Pulmonary/Chest: Effort normal and breath sounds normal  No stridor  No respiratory distress  He has no wheezes  He has no rales  He exhibits no tenderness  Abdominal: Soft  Bowel sounds are normal  He exhibits no distension and no mass  There is no tenderness  There is no rebound and no guarding  A hernia (small umbilical; small bilateral inguinal) is present  Musculoskeletal: Normal range of motion  He exhibits no edema, tenderness or deformity  Lymphadenopathy:     He has no cervical adenopathy  Neurological: He is alert and oriented to person, place, and time  No cranial nerve deficit  Coordination normal    Skin: Skin is dry  No rash noted  He is not diaphoretic  No erythema  No pallor  Multiple pigmented nevi, scattered hemangiomas, and seborrhea of neck and back  Psychiatric: He has a normal mood and affect  His behavior is normal  Thought content normal    Nursing note and vitals reviewed  By signing my name below, I, Leta Mejia, attest that this documentation has been prepared under the direction and in the presence of Nate Blair MD  Electronically Signed: Viki Rhoades  6/17/19  Ash Cabrera, personally performed the services described in this documentation  All medical record entries made by the scribe were at my direction and in my presence   I have reviewed the chart and discharge instructions and agree that the record reflects my personal performance and is accurate and complete  Kashmir Sprague MD  6/17/19

## 2019-06-25 ENCOUNTER — TELEPHONE (OUTPATIENT)
Dept: FAMILY MEDICINE CLINIC | Facility: CLINIC | Age: 49
End: 2019-06-25

## 2019-06-28 ENCOUNTER — HOSPITAL ENCOUNTER (OUTPATIENT)
Dept: RADIOLOGY | Facility: HOSPITAL | Age: 49
Discharge: HOME | End: 2019-06-28
Attending: SURGERY
Payer: COMMERCIAL

## 2019-06-28 ENCOUNTER — LAB (OUTPATIENT)
Dept: LAB | Facility: HOSPITAL | Age: 49
DRG: 331 | End: 2019-06-28
Attending: SURGERY
Payer: COMMERCIAL

## 2019-06-28 ENCOUNTER — TRANSCRIBE ORDERS (OUTPATIENT)
Dept: ADMINISTRATIVE | Facility: HOSPITAL | Age: 49
End: 2019-06-28

## 2019-06-28 ENCOUNTER — HOSPITAL ENCOUNTER (OUTPATIENT)
Dept: NON INVASIVE DIAGNOSTICS | Facility: HOSPITAL | Age: 49
Discharge: HOME | End: 2019-06-28
Attending: SURGERY

## 2019-06-28 DIAGNOSIS — K57.32 DIVERTICULITIS OF COLON (WITHOUT MENTION OF HEMORRHAGE)(562.11): Primary | ICD-10-CM

## 2019-06-28 DIAGNOSIS — K57.32 DIVERTICULITIS OF SIGMOID COLON: ICD-10-CM

## 2019-06-28 DIAGNOSIS — K57.32 DIVERTICULITIS OF COLON (WITHOUT MENTION OF HEMORRHAGE)(562.11): ICD-10-CM

## 2019-06-28 LAB
ABO GROUP BLD: NORMAL
ANION GAP SERPL CALCULATED.3IONS-SCNC: 9 MMOL/L (ref 4–13)
APTT PPP: 30 SECONDS (ref 23–37)
BASOPHILS # BLD AUTO: 0.01 THOUSANDS/ΜL (ref 0–0.1)
BASOPHILS NFR BLD AUTO: 0 % (ref 0–1)
BILIRUB UR QL STRIP: NEGATIVE
BLD GP AB SCN SERPL QL: NEGATIVE
BUN SERPL-MCNC: 22 MG/DL (ref 5–25)
CALCIUM SERPL-MCNC: 9 MG/DL (ref 8.3–10.1)
CHLORIDE SERPL-SCNC: 104 MMOL/L (ref 100–108)
CLARITY UR: CLEAR
CO2 SERPL-SCNC: 26 MMOL/L (ref 21–32)
COLOR UR: YELLOW
CREAT SERPL-MCNC: 1 MG/DL (ref 0.6–1.3)
EOSINOPHIL # BLD AUTO: 0.38 THOUSAND/ΜL (ref 0–0.61)
EOSINOPHIL NFR BLD AUTO: 6 % (ref 0–6)
ERYTHROCYTE [DISTWIDTH] IN BLOOD BY AUTOMATED COUNT: 12.2 % (ref 11.6–15.1)
EST. AVERAGE GLUCOSE BLD GHB EST-MCNC: 128 MG/DL
GFR SERPL CREATININE-BSD FRML MDRD: 87 ML/MIN/1.73SQ M
GLUCOSE P FAST SERPL-MCNC: 178 MG/DL (ref 65–99)
GLUCOSE UR STRIP-MCNC: NEGATIVE MG/DL
HBA1C MFR BLD: 6.1 % (ref 4.2–6.3)
HCT VFR BLD AUTO: 43.3 % (ref 36.5–49.3)
HGB BLD-MCNC: 14.7 G/DL (ref 12–17)
HGB UR QL STRIP.AUTO: NEGATIVE
IMM GRANULOCYTES # BLD AUTO: 0.02 THOUSAND/UL (ref 0–0.2)
IMM GRANULOCYTES NFR BLD AUTO: 0 % (ref 0–2)
INR PPP: 1.04 (ref 0.84–1.19)
KETONES UR STRIP-MCNC: NEGATIVE MG/DL
LEUKOCYTE ESTERASE UR QL STRIP: NEGATIVE
LYMPHOCYTES # BLD AUTO: 1.52 THOUSANDS/ΜL (ref 0.6–4.47)
LYMPHOCYTES NFR BLD AUTO: 26 % (ref 14–44)
MCH RBC QN AUTO: 31.1 PG (ref 26.8–34.3)
MCHC RBC AUTO-ENTMCNC: 33.9 G/DL (ref 31.4–37.4)
MCV RBC AUTO: 92 FL (ref 82–98)
MONOCYTES # BLD AUTO: 0.49 THOUSAND/ΜL (ref 0.17–1.22)
MONOCYTES NFR BLD AUTO: 8 % (ref 4–12)
NEUTROPHILS # BLD AUTO: 3.52 THOUSANDS/ΜL (ref 1.85–7.62)
NEUTS SEG NFR BLD AUTO: 60 % (ref 43–75)
NITRITE UR QL STRIP: NEGATIVE
NRBC BLD AUTO-RTO: 0 /100 WBCS
PH UR STRIP.AUTO: 6 [PH]
PLATELET # BLD AUTO: 268 THOUSANDS/UL (ref 149–390)
PMV BLD AUTO: 9.1 FL (ref 8.9–12.7)
POTASSIUM SERPL-SCNC: 4.2 MMOL/L (ref 3.5–5.3)
PROT UR STRIP-MCNC: NEGATIVE MG/DL
PROTHROMBIN TIME: 13.3 SECONDS (ref 11.6–14.5)
RBC # BLD AUTO: 4.72 MILLION/UL (ref 3.88–5.62)
RH BLD: POSITIVE
SODIUM SERPL-SCNC: 139 MMOL/L (ref 136–145)
SP GR UR STRIP.AUTO: <=1.005 (ref 1–1.03)
SPECIMEN EXPIRATION DATE: NORMAL
UROBILINOGEN UR QL STRIP.AUTO: 0.2 E.U./DL
WBC # BLD AUTO: 5.94 THOUSAND/UL (ref 4.31–10.16)

## 2019-06-28 PROCEDURE — 86900 BLOOD TYPING SEROLOGIC ABO: CPT

## 2019-06-28 PROCEDURE — 80048 BASIC METABOLIC PNL TOTAL CA: CPT

## 2019-06-28 PROCEDURE — 85730 THROMBOPLASTIN TIME PARTIAL: CPT

## 2019-06-28 PROCEDURE — 83036 HEMOGLOBIN GLYCOSYLATED A1C: CPT

## 2019-06-28 PROCEDURE — 81003 URINALYSIS AUTO W/O SCOPE: CPT | Performed by: SURGERY

## 2019-06-28 PROCEDURE — 86850 RBC ANTIBODY SCREEN: CPT

## 2019-06-28 PROCEDURE — 71046 X-RAY EXAM CHEST 2 VIEWS: CPT

## 2019-06-28 PROCEDURE — 85025 COMPLETE CBC W/AUTO DIFF WBC: CPT

## 2019-06-28 PROCEDURE — 86901 BLOOD TYPING SEROLOGIC RH(D): CPT

## 2019-06-28 PROCEDURE — 36415 COLL VENOUS BLD VENIPUNCTURE: CPT

## 2019-06-28 PROCEDURE — 85610 PROTHROMBIN TIME: CPT

## 2019-06-28 PROCEDURE — 93005 ELECTROCARDIOGRAM TRACING: CPT

## 2019-06-28 RX ORDER — NEOMYCIN SULFATE 500 MG/1
TABLET ORAL
Refills: 0 | COMMUNITY
Start: 2019-06-18 | End: 2019-07-19 | Stop reason: ALTCHOICE

## 2019-06-28 NOTE — PRE-PROCEDURE INSTRUCTIONS
Pre-Surgery Instructions:   Medication Instructions    ARIPiprazole (ABILIFY) 5 mg tablet Instructed patient per Anesthesia Guidelines   clonazePAM (KlonoPIN) 1 mg tablet Instructed patient per Anesthesia Guidelines   FLUoxetine (PROzac) 40 MG capsule Instructed patient per Anesthesia Guidelines   melatonin 3 mg Instructed patient per Anesthesia Guidelines  Pre-op Showering Instructions for Surgery Patients    Before your operation, you play an important role in decreasing your risk for infection by washing with special antiseptic soap  This is an effective way to reduce bacteria on the skin which may help to prevent infections at the surgical site  Please read the following directions in advance  1  In the week before your operation, purchase a 4 ounce bottle of antiseptic soap containing chlorhexidine gluconate (CHG)  4%  Some brand names include: Aplicare®, Endure, and Hibiclens®  The cost is usually less than $5 00   For your convenience, the [employer] carries the soap   It may also be available at your doctors office or pre-admission testing center, and at most retail pharmacies   If you are allergic or sensitive to soaps containing CHG, please let your doctor know so another antiseptic can be suggested   CHG antiseptic soap is for external use only  2   The day before your operation, follow these instructions carefully to get ready   Please clean linens (sheets) on your bed; you should sleep on clean sheets after your evening shower   Get clean towels and washcloth ready  you need enough for 2 showers   Set aside clean underwear, pajamas, and clothing to wear after the showers     Reminders:   DO NOT use any other soap or body rinse on your skin during or after the antiseptic showers   DO NOT use lotion, powder, deodorant, or perfume/aftershave of any kind on your skin after your antiseptic shower   DO NOT shave any body parts in the 24 hours/day before your operation   DO NOT get the antiseptic soap in your eyes, ears, nose, mouth, or vaginal area    3  You will need to shower the night before AND the morning of your surgery  Shower 1:   The first evening before the operation, take the first shower   First, shampoo your hair with regular shampoo and rinse it completely before you use the antiseptic soap  After washing and rinsing your hair, rinse your body   Next, use a clean washcloth to apply the antiseptic soap and wash your body from the neck down to your toes using ½ bottle of the antiseptic soap   You will use the other ½ bottle for the second shower   Clean the area where your incision will be; lather this area well for about 2 minutes   If you are having head or neck surgery, wash areas with the antiseptic soap   Rinse yourself completely with running water   Use a clean towel to dry off   Wear clean underwear and clothing/pajamas  Shower 2   The morning of your operation, take the second shower following the same steps as Shower 1 using the second ½ of the bottle of antiseptic soap   Use clean cloths and towels to wash and dry yourself   Wear clean underwear and clothing

## 2019-06-30 LAB
ATRIAL RATE: 67 BPM
P AXIS: 56 DEGREES
PR INTERVAL: 156 MS
QRS AXIS: 37 DEGREES
QRSD INTERVAL: 80 MS
QT INTERVAL: 378 MS
QTC INTERVAL: 399 MS
T WAVE AXIS: 35 DEGREES
VENTRICULAR RATE: 67 BPM

## 2019-06-30 PROCEDURE — 93010 ELECTROCARDIOGRAM REPORT: CPT | Performed by: INTERNAL MEDICINE

## 2019-07-02 ENCOUNTER — OFFICE VISIT (OUTPATIENT)
Dept: SLEEP CENTER | Facility: HOSPITAL | Age: 49
End: 2019-07-02
Payer: COMMERCIAL

## 2019-07-02 VITALS
WEIGHT: 187 LBS | DIASTOLIC BLOOD PRESSURE: 72 MMHG | HEART RATE: 78 BPM | HEIGHT: 70 IN | SYSTOLIC BLOOD PRESSURE: 126 MMHG | BODY MASS INDEX: 26.77 KG/M2

## 2019-07-02 DIAGNOSIS — G47.33 OSA (OBSTRUCTIVE SLEEP APNEA): Primary | ICD-10-CM

## 2019-07-02 DIAGNOSIS — E66.3 OVERWEIGHT (BMI 25.0-29.9): ICD-10-CM

## 2019-07-02 DIAGNOSIS — J45.20 MILD INTERMITTENT ASTHMA WITHOUT COMPLICATION: ICD-10-CM

## 2019-07-02 DIAGNOSIS — G47.00 INSOMNIA, UNSPECIFIED TYPE: ICD-10-CM

## 2019-07-02 DIAGNOSIS — F32.1 MODERATE SINGLE CURRENT EPISODE OF MAJOR DEPRESSIVE DISORDER (HCC): ICD-10-CM

## 2019-07-02 PROCEDURE — 99244 OFF/OP CNSLTJ NEW/EST MOD 40: CPT | Performed by: INTERNAL MEDICINE

## 2019-07-02 NOTE — PROGRESS NOTES
Review of Systems      Genitourinary none   Cardiology none   Gastrointestinal none   Neurology none   Constitutional none   Integumentary none   Psychiatry none   Musculoskeletal leg cramps   Pulmonary none   ENT none   Endocrine none   Hematological none

## 2019-07-02 NOTE — PATIENT INSTRUCTIONS
What is OBED? Obstructive sleep apnea is a common and serious sleep disorder that causes you to stop breathing during sleep  The airway repeatedly becomes blocked, limiting the amount of air that reaches your lungs  When this happens, you may snore loudly or making choking noises as you try to breathe  Your brain and body becomes oxygen deprived and you may wake up  This may happen a few times a night, or in more severe cases, several hundred times a night  Sleep apnea can make you wake up in the morning feeling tired or unrefreshed even though you have had a full night of sleep  During the day, you may feel fatigued, have difficulty concentrating or you may even unintentionally fall asleep  This is because your body is waking up numerous times throughout the night, even though you might not be conscious of each awakening  The lack of oxygen your body receives can have negative long-term consequences for your health  This includes:  High blood pressure  Heart disease  Irregular heart rhythms  Stroke  Pre-diabetes and diabetes  Depression    Testing  An objective evaluation of your sleep may be needed before your board certified sleep physician can make a diagnosis  Options include:   In-lab overnight sleep study  This type of sleep study requires you to stay overnight at a sleep center, in a bed that may resemble a hotel room  You will sleep with sensors hooked up to various parts of your body  These sensors record your brain waves, heartbeat, breathing and movement  An overnight sleep study also provides your doctor with the most complete information about your sleep  Learn more about an overnight sleep study      Home sleep apnea test  Some patients with high risk factors for obstructive sleep apnea and no other medical disorders may be candidates for a home sleep apnea test  The testing equipment differs in that it is less complicated than what is used in an overnight sleep study   As such, does not give all the data an in-lab will and if negative, may not mean you do not have the problem  Treatment for sleep apnea  includes using a continuous positive airway pressure (CPAP) machine to keep your airway open during sleep  A mask is placed over your nose and mouth, or just your nose  The mask is hooked to the CPAP machine that blows a gentle stream of air into the mask when you breathe  This helps keep your airway open so you can breathe more regularly  Extra oxygen may be given to you through the machine  You may be given a mouth device  It looks like a mouth guard or dental retainer and stops your tongue and mouth tissues from blocking your throat while you sleep  Surgery may be needed to remove extra tissues that block your mouth, throat, or nose  Manage sleep apnea:   Do not smoke  Nicotine and other chemicals in cigarettes and cigars can cause lung damage  Ask your healthcare provider for information if you currently smoke and need help to quit  E-cigarettes or smokeless tobacco still contain nicotine  Talk to your healthcare provider before you use these products  Do not drink alcohol or take sedative medicine before you go to sleep  Alcohol and sedatives can relax the muscles and tissues around your throat  This can block the airflow to your lungs  Maintain a healthy weight  Excess tissue around your throat may restrict your breathing  Ask your healthcare provider for information if you need to lose weight  Sleep on your side or use pillows designed to prevent sleep apnea  This prevents your tongue or other tissues from blocking your throat  You can also raise the head of your bed  Driving Safety  Refrain from driving when drowsy  Follow up with your healthcare provider as directed:  Write down your questions so you remember to ask them during your visits  Go to AASM website for more information: Sleepeducation  org     What is OBED?    Obstructive sleep apnea is a common and serious sleep disorder that causes you to stop breathing during sleep  The airway repeatedly becomes blocked, limiting the amount of air that reaches your lungs  When this happens, you may snore loudly or making choking noises as you try to breathe  Your brain and body becomes oxygen deprived and you may wake up  This may happen a few times a night, or in more severe cases, several hundred times a night  Sleep apnea can make you wake up in the morning feeling tired or unrefreshed even though you have had a full night of sleep  During the day, you may feel fatigued, have difficulty concentrating or you may even unintentionally fall asleep  This is because your body is waking up numerous times throughout the night, even though you might not be conscious of each awakening  The lack of oxygen your body receives can have negative long-term consequences for your health  This includes:  High blood pressure  Heart disease  Irregular heart rhythms  Stroke  Pre-diabetes and diabetes  Depression    Testing  An objective evaluation of your sleep may be needed before your board certified sleep physician can make a diagnosis  Options include:   In-lab overnight sleep study  This type of sleep study requires you to stay overnight at a sleep center, in a bed that may resemble a hotel room  You will sleep with sensors hooked up to various parts of your body  These sensors record your brain waves, heartbeat, breathing and movement  An overnight sleep study also provides your doctor with the most complete information about your sleep  Learn more about an overnight sleep study      Home sleep apnea test  Some patients with high risk factors for obstructive sleep apnea and no other medical disorders may be candidates for a home sleep apnea test  The testing equipment differs in that it is less complicated than what is used in an overnight sleep study   As such, does not give all the data an in-lab will and if negative, may not mean you do not have the problem  Treatment for sleep apnea  includes using a continuous positive airway pressure (CPAP) machine to keep your airway open during sleep  A mask is placed over your nose and mouth, or just your nose  The mask is hooked to the CPAP machine that blows a gentle stream of air into the mask when you breathe  This helps keep your airway open so you can breathe more regularly  Extra oxygen may be given to you through the machine  You may be given a mouth device  It looks like a mouth guard or dental retainer and stops your tongue and mouth tissues from blocking your throat while you sleep  Surgery may be needed to remove extra tissues that block your mouth, throat, or nose  Manage sleep apnea:   Do not smoke  Nicotine and other chemicals in cigarettes and cigars can cause lung damage  Ask your healthcare provider for information if you currently smoke and need help to quit  E-cigarettes or smokeless tobacco still contain nicotine  Talk to your healthcare provider before you use these products  Do not drink alcohol or take sedative medicine before you go to sleep  Alcohol and sedatives can relax the muscles and tissues around your throat  This can block the airflow to your lungs  Maintain a healthy weight  Excess tissue around your throat may restrict your breathing  Ask your healthcare provider for information if you need to lose weight  Sleep on your side or use pillows designed to prevent sleep apnea  This prevents your tongue or other tissues from blocking your throat  You can also raise the head of your bed  Driving Safety  Refrain from driving when drowsy  Follow up with your healthcare provider as directed:  Write down your questions so you remember to ask them during your visits  Go to AASM website for more information: Sleepeducation  org           What you can do to improve your sleep: (Sleep Hygiene) Basic rules for a good night's sleep    Create a regular sleep schedule    This will help you form a sleep routine  Keep a record of your sleep patterns, and any sleeping problems you have  Bring the record to follow-up visits with healthcare providers  Avoid prolonged use of light-emitting screens before bedtime or watching TV in bed  Avoid forcing sleep  Do not take naps  Naps could make it hard for you to fall asleep at bedtime  Deal with your worries before bedtime  Keep your bedroom cool, quiet, and dark  Turn on white noise, such as a fan, to help you relax  Do not use your bed for any activity that will keep you awake  Do not read, exercise, eat, or watch TV in your bedroom  Get up if you do not fall asleep within 20 minutes  Move to another room and do something relaxing until you become sleepy  Limit caffeine, alcohol, nicotine and food to earlier in the day  Only drink caffeine in the morning  Do not drink alcohol within 6 hours of bedtime  Do not eat a heavy meal right before you go to bed  Avoid smoking, especially in the evening  Exercise regularly  Daily exercise will help you sleep better  Do not exercise within 4 hours of bedtime  Stimulus control therapy rules  1  Go to bed only when sleepy  2  Do not watch television, read, eat, or worry while in bed  Use bed only for sleep and sex  3  Get out of bed if unable to fall asleep within 20 minutes and go to another room  Return to bed only when sleepy  Repeat this step as many times as necessary throughout the night  4  Set an alarm clock to wake up at a fixed time each morning, including weekends  5  Do not take a nap during the day  Data from: [facility], [address] Nonpharmacologic treatments of insomnia  [facility] 7190; 53:37  Go to AASM website for more information: Sleepeducation  org     Recommended Reading:  Book by authors [address]   No More sleepless nights

## 2019-07-02 NOTE — PROGRESS NOTES
Consultation - [id number] [facility] [address]  47 y o  male  DM  AJM:[id number]    Physician Requesting Consult: Leopoldo Loan, MD     Reason for Consult : At your kind request I saw this patient for initial sleep evaluation today  The patient is here to evaluate for suspected Obstructive Sleep Apnea  PFSH, Problem List, Medications & Allergies were reviewed in EMR  He  has a past medical history of Anxiety, Asthma, Depression, Diverticulitis, Hyperlipemia, Medial meniscus tear, Psychiatric disorder, Psychiatric illness, and Suicide attempt ([facility] [address] )  He has a current medication list which includes the following prescription(s): aripiprazole, clonazepam, fluoxetine, melatonin, and neomycin  HPI:  He reports loud snoring of several years duration that disturbs his wife was also witnessed apnea  He is not aware of snoring or breathing difficulties during sleep  Snoring would improve when he was in the lateral position but this no longer helps  Other Complaints: none  Restless Leg Syndrome: reports no suggestive symptoms   Parasomnia: no features reported   Sleep Routine:   Typical Bedtime:  10:00 p m  Gets OOB:  6:30 a m  TIB:8 5 hrs  Sleep latency:< 15 minutes but needs melatonin and Benadryl as sleep aids otherwise my brain does not short off  Sleep Interruptions:2 3 x/night he is unsure of the cause and able to fall back asleep  Awakens: spontaneously  Upon awakening:is not always refreshed He estimates getting 6 hrs sleep  He denied Excessive Daytime Sleepiness or napping  [place] Sleepiness Scale rated at Total score: 3 /24  Habits: reports that he has quit smoking  His smoking use included cigarettes  He has a 30 00 pack-year smoking history  He uses smokeless tobacco , reports that he does not drink alcohol ,  reports that he does not use drugs  ,Caffeine use:excessive until around 6:00 p m , Exercise routine: none but is physically active in his job  Family History:   Mother has insomnia  ROS: reviewed & as attached  Significant for approximately 10 lb weight gain in the past 4 months since he stopped smoking  He regularly awakens with nasal congestion  He reported no respiratory or cardiac symptoms  He feels mood has improved on current medications  EXAM:    Vitals /72   Pulse 78   Ht 5' 9 5" (1 765 m)   Wt 84 8 kg (187 lb)   BMI 27 22 kg/m²     General  Well groomed male; appears stated age; no apparent distress  Psychiatric   Speech:clear and coherent; Cooperative  Normal mood, affect & thought    Head   Craniofacial anatomy:narrow facies Sinuses: non- tender  TMJ: Normal     Eyes   EOM's intact;  conjunctiva/corneas clear         Nasal Airway  is patent Septum:central, Mucous membranes:appear normal   Turbinates: normal ;  No Rhinorrhea; No PND  Oral   Airway  crowded Tongue:  ; Modified Mallampati class IV (only hard palate visible)  Hard Palate:narrow; Soft Palate:  redundant soft palateTonsils: no hypertrophy  Teeth: normal       Neck  appears thick; Neck Circumference: 38 5cm; Supple; no abnormal masses; Thyroid:normal  Trachea:central      Lymph    No Cervical or Submandibular Lymhadenopathy   Heart:   S1,S2 normal;RRR; no gallop; nomurmurs     Lungs   Respiratory Effort:normal  Air entry good bilaterally  No wheezes  No rales   Abdomen   Obese, Soft & non-tender     Extremities   No pedal edema  No clubbing or cyanosis  Skin   Skin is warm and dry; Color& Hydration good; no facial rashes or lesions    Neurologic  Alert and orientated; CNII-XII intact; Motor normal; Sensation normal    Muscskeltl    Muscle bulk, tone and power WNL Gait:normal           IMPRESSION: Primary Sleep/Secondary(to Medical or Psych conditions) & comorbidities   1  OBED (obstructive sleep apnea)  Ambulatory referral to Sleep Medicine    Diagnostic Sleep Study   2  Insomnia, unspecified type     3  Mild intermittent asthma without complication     4   Moderate single current episode of major depressive disorder ([facility] [address] )     5  Overweight (BMI 25 0-29  9)          PLAN:  1  Comprehensive counseling was provided on pathophysiology, diagnostic strategies & treatment options; effects on symptoms and comorbidities; risks of inadequate therapy; costs and insurance aspects  2  I advised on weight reduction, avoiding sleeping supine, using alcohol or sedating medications close to bed time and on safe driving practices  3  Cognitive behavioral therapy was initiated with advise on Sleep Hygiene and behavioral techniques to manage Insomnia  Specifically, avoiding caffeine use after 3:00 p m , regular aerobic exercise, limiting time in bed to 7-1/2 hours and on relaxation techniques  4  Nocturnal polysomnography is indicated and a diagnostic study will be scheduled  5  Patient is interested in the mandibular advancement device  6  Follow-up will be scheduled after the studies to review results, further details of treatment options and to initiate/adjust therapy  Thank you for allowing me to participate in the care of this patient  I will keep you apprised of developments      Sincerely,     Authenticated electronically by Tee Clarke MD   on 93/52/19   Board Certified Specialist

## 2019-07-08 RX ORDER — CEFAZOLIN SODIUM 2 G/50ML
2000 SOLUTION INTRAVENOUS ONCE
Status: CANCELLED | OUTPATIENT
Start: 2019-07-09

## 2019-07-08 RX ORDER — SODIUM CHLORIDE, SODIUM LACTATE, POTASSIUM CHLORIDE, CALCIUM CHLORIDE 600; 310; 30; 20 MG/100ML; MG/100ML; MG/100ML; MG/100ML
125 INJECTION, SOLUTION INTRAVENOUS CONTINUOUS
Status: CANCELLED | OUTPATIENT
Start: 2019-07-09

## 2019-07-09 ENCOUNTER — ANESTHESIA (OUTPATIENT)
Dept: PERIOP | Facility: HOSPITAL | Age: 49
DRG: 331 | End: 2019-07-09
Payer: COMMERCIAL

## 2019-07-09 ENCOUNTER — HOSPITAL ENCOUNTER (INPATIENT)
Facility: HOSPITAL | Age: 49
LOS: 2 days | Discharge: HOME | DRG: 331 | End: 2019-07-11
Attending: SURGERY | Admitting: SURGERY
Payer: COMMERCIAL

## 2019-07-09 ENCOUNTER — ANESTHESIA EVENT (OUTPATIENT)
Dept: PERIOP | Facility: HOSPITAL | Age: 49
DRG: 331 | End: 2019-07-09
Payer: COMMERCIAL

## 2019-07-09 DIAGNOSIS — K57.32 DIVERTICULITIS OF SIGMOID COLON: ICD-10-CM

## 2019-07-09 PROCEDURE — 88307 TISSUE EXAM BY PATHOLOGIST: CPT | Performed by: PATHOLOGY

## 2019-07-09 PROCEDURE — 0DBM4ZZ EXCISION OF DESCENDING COLON, PERCUTANEOUS ENDOSCOPIC APPROACH: ICD-10-PCS | Performed by: SURGERY

## 2019-07-09 PROCEDURE — 88304 TISSUE EXAM BY PATHOLOGIST: CPT | Performed by: PATHOLOGY

## 2019-07-09 PROCEDURE — 44207 L COLECTOMY/COLOPROCTOSTOMY: CPT | Performed by: SURGERY

## 2019-07-09 PROCEDURE — 44207 L COLECTOMY/COLOPROCTOSTOMY: CPT | Performed by: PHYSICIAN ASSISTANT

## 2019-07-09 PROCEDURE — 0DJD8ZZ INSPECTION OF LOWER INTESTINAL TRACT, VIA NATURAL OR ARTIFICIAL OPENING ENDOSCOPIC: ICD-10-PCS | Performed by: SURGERY

## 2019-07-09 PROCEDURE — 0DTN4ZZ RESECTION OF SIGMOID COLON, PERCUTANEOUS ENDOSCOPIC APPROACH: ICD-10-PCS | Performed by: SURGERY

## 2019-07-09 RX ORDER — CLONAZEPAM 1 MG/1
1 TABLET ORAL 2 TIMES DAILY
Status: DISCONTINUED | OUTPATIENT
Start: 2019-07-09 | End: 2019-07-11 | Stop reason: HOSPADM

## 2019-07-09 RX ORDER — CEFAZOLIN SODIUM 2 G/50ML
2000 SOLUTION INTRAVENOUS ONCE
Status: DISCONTINUED | OUTPATIENT
Start: 2019-07-09 | End: 2019-07-09 | Stop reason: HOSPADM

## 2019-07-09 RX ORDER — HYDROMORPHONE HCL/PF 1 MG/ML
0.5 SYRINGE (ML) INJECTION
Status: DISCONTINUED | OUTPATIENT
Start: 2019-07-09 | End: 2019-07-09 | Stop reason: HOSPADM

## 2019-07-09 RX ORDER — METOCLOPRAMIDE HYDROCHLORIDE 5 MG/ML
10 INJECTION INTRAMUSCULAR; INTRAVENOUS ONCE AS NEEDED
Status: DISCONTINUED | OUTPATIENT
Start: 2019-07-09 | End: 2019-07-09 | Stop reason: HOSPADM

## 2019-07-09 RX ORDER — ONDANSETRON 2 MG/ML
4 INJECTION INTRAMUSCULAR; INTRAVENOUS EVERY 6 HOURS PRN
Status: DISCONTINUED | OUTPATIENT
Start: 2019-07-09 | End: 2019-07-11 | Stop reason: HOSPADM

## 2019-07-09 RX ORDER — HEPARIN SODIUM 5000 [USP'U]/ML
5000 INJECTION, SOLUTION INTRAVENOUS; SUBCUTANEOUS EVERY 8 HOURS SCHEDULED
Status: DISCONTINUED | OUTPATIENT
Start: 2019-07-09 | End: 2019-07-11 | Stop reason: HOSPADM

## 2019-07-09 RX ORDER — ARIPIPRAZOLE 5 MG/1
5 TABLET ORAL EVERY EVENING
Status: DISCONTINUED | OUTPATIENT
Start: 2019-07-09 | End: 2019-07-11 | Stop reason: HOSPADM

## 2019-07-09 RX ORDER — FLUOXETINE HYDROCHLORIDE 20 MG/1
40 CAPSULE ORAL DAILY
Status: DISCONTINUED | OUTPATIENT
Start: 2019-07-10 | End: 2019-07-11 | Stop reason: HOSPADM

## 2019-07-09 RX ORDER — FENTANYL CITRATE/PF 50 MCG/ML
25 SYRINGE (ML) INJECTION
Status: DISCONTINUED | OUTPATIENT
Start: 2019-07-09 | End: 2019-07-09 | Stop reason: HOSPADM

## 2019-07-09 RX ORDER — KETOROLAC TROMETHAMINE 30 MG/ML
INJECTION, SOLUTION INTRAMUSCULAR; INTRAVENOUS AS NEEDED
Status: DISCONTINUED | OUTPATIENT
Start: 2019-07-09 | End: 2019-07-09

## 2019-07-09 RX ORDER — PROPOFOL 10 MG/ML
INJECTION, EMULSION INTRAVENOUS AS NEEDED
Status: DISCONTINUED | OUTPATIENT
Start: 2019-07-09 | End: 2019-07-09 | Stop reason: SURG

## 2019-07-09 RX ORDER — ONDANSETRON 2 MG/ML
4 INJECTION INTRAMUSCULAR; INTRAVENOUS ONCE AS NEEDED
Status: DISCONTINUED | OUTPATIENT
Start: 2019-07-09 | End: 2019-07-09 | Stop reason: HOSPADM

## 2019-07-09 RX ORDER — HYDROMORPHONE HCL/PF 1 MG/ML
1 SYRINGE (ML) INJECTION
Status: DISCONTINUED | OUTPATIENT
Start: 2019-07-09 | End: 2019-07-11 | Stop reason: HOSPADM

## 2019-07-09 RX ORDER — MIDAZOLAM HYDROCHLORIDE 1 MG/ML
INJECTION INTRAMUSCULAR; INTRAVENOUS AS NEEDED
Status: DISCONTINUED | OUTPATIENT
Start: 2019-07-09 | End: 2019-07-09 | Stop reason: SURG

## 2019-07-09 RX ORDER — ROCURONIUM BROMIDE 10 MG/ML
INJECTION, SOLUTION INTRAVENOUS AS NEEDED
Status: DISCONTINUED | OUTPATIENT
Start: 2019-07-09 | End: 2019-07-09 | Stop reason: SURG

## 2019-07-09 RX ORDER — CEFAZOLIN SODIUM 2 G/50ML
SOLUTION INTRAVENOUS AS NEEDED
Status: DISCONTINUED | OUTPATIENT
Start: 2019-07-09 | End: 2019-07-09 | Stop reason: SURG

## 2019-07-09 RX ORDER — SODIUM CHLORIDE 9 MG/ML
125 INJECTION, SOLUTION INTRAVENOUS CONTINUOUS
Status: DISCONTINUED | OUTPATIENT
Start: 2019-07-09 | End: 2019-07-10

## 2019-07-09 RX ORDER — NEOSTIGMINE METHYLSULFATE 1 MG/ML
INJECTION INTRAVENOUS AS NEEDED
Status: DISCONTINUED | OUTPATIENT
Start: 2019-07-09 | End: 2019-07-09 | Stop reason: SURG

## 2019-07-09 RX ORDER — CHLORHEXIDINE GLUCONATE 0.12 MG/ML
15 RINSE ORAL ONCE
Status: COMPLETED | OUTPATIENT
Start: 2019-07-09 | End: 2019-07-09

## 2019-07-09 RX ORDER — SODIUM CHLORIDE 9 MG/ML
INJECTION, SOLUTION INTRAVENOUS AS NEEDED
Status: DISCONTINUED | OUTPATIENT
Start: 2019-07-09 | End: 2019-07-09 | Stop reason: HOSPADM

## 2019-07-09 RX ORDER — ONDANSETRON 2 MG/ML
INJECTION INTRAMUSCULAR; INTRAVENOUS AS NEEDED
Status: DISCONTINUED | OUTPATIENT
Start: 2019-07-09 | End: 2019-07-09 | Stop reason: SURG

## 2019-07-09 RX ORDER — SODIUM CHLORIDE, SODIUM LACTATE, POTASSIUM CHLORIDE, CALCIUM CHLORIDE 600; 310; 30; 20 MG/100ML; MG/100ML; MG/100ML; MG/100ML
125 INJECTION, SOLUTION INTRAVENOUS CONTINUOUS
Status: DISCONTINUED | OUTPATIENT
Start: 2019-07-09 | End: 2019-07-10

## 2019-07-09 RX ORDER — HYDROCODONE BITARTRATE AND ACETAMINOPHEN 5; 325 MG/1; MG/1
2 TABLET ORAL EVERY 4 HOURS PRN
Status: DISCONTINUED | OUTPATIENT
Start: 2019-07-09 | End: 2019-07-11 | Stop reason: HOSPADM

## 2019-07-09 RX ORDER — FENTANYL CITRATE 50 UG/ML
INJECTION, SOLUTION INTRAMUSCULAR; INTRAVENOUS AS NEEDED
Status: DISCONTINUED | OUTPATIENT
Start: 2019-07-09 | End: 2019-07-09 | Stop reason: SURG

## 2019-07-09 RX ORDER — KETOROLAC TROMETHAMINE 30 MG/ML
INJECTION, SOLUTION INTRAMUSCULAR; INTRAVENOUS AS NEEDED
Status: DISCONTINUED | OUTPATIENT
Start: 2019-07-09 | End: 2019-07-09 | Stop reason: SURG

## 2019-07-09 RX ORDER — GLYCOPYRROLATE 0.2 MG/ML
INJECTION INTRAMUSCULAR; INTRAVENOUS AS NEEDED
Status: DISCONTINUED | OUTPATIENT
Start: 2019-07-09 | End: 2019-07-09 | Stop reason: SURG

## 2019-07-09 RX ADMIN — ROCURONIUM BROMIDE 10 MG: 10 INJECTION, SOLUTION INTRAVENOUS at 10:26

## 2019-07-09 RX ADMIN — FENTANYL CITRATE 50 MCG: 50 INJECTION, SOLUTION INTRAMUSCULAR; INTRAVENOUS at 10:32

## 2019-07-09 RX ADMIN — SODIUM CHLORIDE, SODIUM LACTATE, POTASSIUM CHLORIDE, AND CALCIUM CHLORIDE: .6; .31; .03; .02 INJECTION, SOLUTION INTRAVENOUS at 11:09

## 2019-07-09 RX ADMIN — METRONIDAZOLE 500 MG: 500 INJECTION, SOLUTION INTRAVENOUS at 09:05

## 2019-07-09 RX ADMIN — ROCURONIUM BROMIDE 10 MG: 10 INJECTION, SOLUTION INTRAVENOUS at 09:50

## 2019-07-09 RX ADMIN — CHLORHEXIDINE GLUCONATE 0.12% ORAL RINSE 15 ML: 1.2 LIQUID ORAL at 07:52

## 2019-07-09 RX ADMIN — SODIUM CHLORIDE 125 ML/HR: 0.9 INJECTION, SOLUTION INTRAVENOUS at 20:40

## 2019-07-09 RX ADMIN — GLYCOPYRROLATE 0.8 MG: 0.2 INJECTION, SOLUTION INTRAMUSCULAR; INTRAVENOUS at 11:15

## 2019-07-09 RX ADMIN — CEFAZOLIN SODIUM 2000 MG: 2 SOLUTION INTRAVENOUS at 08:52

## 2019-07-09 RX ADMIN — CLONAZEPAM 1 MG: 1 TABLET ORAL at 18:05

## 2019-07-09 RX ADMIN — SODIUM CHLORIDE, SODIUM LACTATE, POTASSIUM CHLORIDE, AND CALCIUM CHLORIDE 125 ML/HR: .6; .31; .03; .02 INJECTION, SOLUTION INTRAVENOUS at 07:50

## 2019-07-09 RX ADMIN — SODIUM CHLORIDE, SODIUM LACTATE, POTASSIUM CHLORIDE, AND CALCIUM CHLORIDE: .6; .31; .03; .02 INJECTION, SOLUTION INTRAVENOUS at 09:50

## 2019-07-09 RX ADMIN — SODIUM CHLORIDE, SODIUM LACTATE, POTASSIUM CHLORIDE, AND CALCIUM CHLORIDE: .6; .31; .03; .02 INJECTION, SOLUTION INTRAVENOUS at 09:43

## 2019-07-09 RX ADMIN — ROCURONIUM BROMIDE 50 MG: 10 INJECTION, SOLUTION INTRAVENOUS at 08:55

## 2019-07-09 RX ADMIN — HYDROMORPHONE HYDROCHLORIDE 0.5 MG: 1 INJECTION, SOLUTION INTRAMUSCULAR; INTRAVENOUS; SUBCUTANEOUS at 12:11

## 2019-07-09 RX ADMIN — HYDROCODONE BITARTRATE AND ACETAMINOPHEN 2 TABLET: 5; 325 TABLET ORAL at 21:15

## 2019-07-09 RX ADMIN — FENTANYL CITRATE 100 MCG: 50 INJECTION, SOLUTION INTRAMUSCULAR; INTRAVENOUS at 08:49

## 2019-07-09 RX ADMIN — FENTANYL CITRATE 25 MCG: 50 INJECTION, SOLUTION INTRAMUSCULAR; INTRAVENOUS at 12:00

## 2019-07-09 RX ADMIN — MIDAZOLAM 2 MG: 1 INJECTION INTRAMUSCULAR; INTRAVENOUS at 08:45

## 2019-07-09 RX ADMIN — FENTANYL CITRATE 25 MCG: 50 INJECTION, SOLUTION INTRAMUSCULAR; INTRAVENOUS at 11:50

## 2019-07-09 RX ADMIN — ARIPIPRAZOLE 5 MG: 5 TABLET ORAL at 18:05

## 2019-07-09 RX ADMIN — HYDROMORPHONE HYDROCHLORIDE 1 MG: 1 INJECTION, SOLUTION INTRAMUSCULAR; INTRAVENOUS; SUBCUTANEOUS at 14:13

## 2019-07-09 RX ADMIN — KETOROLAC TROMETHAMINE 30 MG: 30 INJECTION, SOLUTION INTRAMUSCULAR; INTRAVENOUS at 10:40

## 2019-07-09 RX ADMIN — SODIUM CHLORIDE 125 ML/HR: 0.9 INJECTION, SOLUTION INTRAVENOUS at 12:56

## 2019-07-09 RX ADMIN — HEPARIN SODIUM 5000 UNITS: 5000 INJECTION INTRAVENOUS; SUBCUTANEOUS at 21:15

## 2019-07-09 RX ADMIN — NEOSTIGMINE METHYLSULFATE 4 MG: 1 INJECTION INTRAVENOUS at 11:16

## 2019-07-09 RX ADMIN — FENTANYL CITRATE 50 MCG: 50 INJECTION, SOLUTION INTRAMUSCULAR; INTRAVENOUS at 10:51

## 2019-07-09 RX ADMIN — PROPOFOL 200 MG: 10 INJECTION, EMULSION INTRAVENOUS at 08:53

## 2019-07-09 RX ADMIN — ROCURONIUM BROMIDE 20 MG: 10 INJECTION, SOLUTION INTRAVENOUS at 09:18

## 2019-07-09 RX ADMIN — ONDANSETRON 4 MG: 2 INJECTION INTRAMUSCULAR; INTRAVENOUS at 11:02

## 2019-07-09 NOTE — INTERIM OP NOTE
RESECTION COLON SIGMOID LAPAROSCOPIC WITH ANASTOMOSIS: INTRAOP COLONOSCOPY  Postoperative Note  PATIENT NAME: Caio Presley  : 1970  MRN: [id number]  QU OR ROOM 02    Surgery Date: 2019    Preop Diagnosis:  Diverticulitis of sigmoid colon [K57 32]    Post-Op Diagnosis Codes:     * Diverticulitis of sigmoid colon [K57 32]    Procedure(s) (LRB):  RESECTION COLON SIGMOID LAPAROSCOPIC WITH ANASTOMOSIS: INTRAOP COLONOSCOPY (N/A)    Surgeon(s) and Role:     * Lovette Ganser, MD - Primary     Kunal Riggins PA-C - Assisting    Specimens:  ID Type Source Tests Collected by Time Destination   1 :  Tissue Large Intestine, Sigmoid Colon TISSUE EXAM Lovette Ganser, MD 2019 1042    2 : Juan Pablo granados Tissue Large Intestine, Sigmoid Colon TISSUE EXAM Lovette Ganser, MD 2019 1043        Estimated Blood Loss:   100 mL    Anesthesia Type:   General ETA    Findings:    as above, sigmoid diverticulosis    Complications:   None    SIGNATURE: Tia Riggins PA-C   DATE: 2019   TIME: 11:36 AM

## 2019-07-09 NOTE — PLAN OF CARE
Problem: Prexisting or High Potential for Compromised Skin Integrity  Goal: Skin integrity is maintained or improved  Description  INTERVENTIONS:  - Identify patients at risk for skin breakdown  - Assess and monitor skin integrity  - Assess and monitor nutrition and hydration status  - Monitor labs (i e  albumin)  - Assess for incontinence   - Turn and reposition patient  - Assist with mobility/ambulation  - Relieve pressure over bony prominences  - Avoid friction and shearing  - Provide appropriate hygiene as needed including keeping skin clean and dry  - Evaluate need for skin moisturizer/barrier cream  - Collaborate with interdisciplinary team (i e  Nutrition, Rehabilitation, etc )   - Patient/family teaching  Outcome: Progressing     Problem: PAIN - ADULT  Goal: Verbalizes/displays adequate comfort level or baseline comfort level  Description  Interventions:  - Encourage patient to monitor pain and request assistance  - Assess pain using appropriate pain scale  - Administer analgesics based on type and severity of pain and evaluate response  - Implement non-pharmacological measures as appropriate and evaluate response  - Consider cultural and social influences on pain and pain management  - Notify physician/advanced practitioner if interventions unsuccessful or patient reports new pain  Outcome: Progressing     Problem: GASTROINTESTINAL - ADULT  Goal: Maintains or returns to baseline bowel function  Description  INTERVENTIONS:  - Assess bowel function  - Encourage oral fluids to ensure adequate hydration  - Administer IV fluids as ordered to ensure adequate hydration  - Administer ordered medications as needed  - Encourage mobilization and activity  - Nutrition services referral to assist patient with appropriate food choices  Outcome: Progressing

## 2019-07-09 NOTE — OP NOTE
Laparoscopic Colon Procedure Note    Name: Paola Mercer   : 1970  MRN: [id number]  Date: 2019    Indications: Sigmoid Diverticulitis    Pre-operative Diagnosis Sigmoid Diverticulitis  Post-operative Diagnosis: Same     Procedure:  Laparoscopic sigmoid colon resection with colorectal anastomosis, intra-op colonoscopy    Surgeon: Hortencia Healy MD     Assistants: Noy GOFF-C  No qualified resident available  PA is medically necessary for the surgical safety of the case, including suturing, retracting, and hemostasis  Anesthesia: General endotracheal anesthesia    ASA Class: 2    Procedure Details T  he patient, after proper facial and armband identification and time out, was placed under general anesthesia with endotracheal tube in satisfactory condition  The patient was then  changed in modified lithotomy position and the abdominal wall was prepped by ChloraPrep and perineum with Betadine  Charles was inserted  The patient was draped in the sterile usual manner  Through Umbilical access a manohar trocar was placed under open technique and was inserted into the abdominal cavity and abdomen was insufflated  Another 5-mm port in the right upper quadrant and a 12 mm port was inserted in the right lower quadrant  Abdominal laparoscopic exploratory was done with the liver normal in appearance  The small bowel is normal  The colon was viable  The sigmoid colon was adhered deep into the pelvis and through the process of dissection the site of the stricture was noted of the colon to the side abdominal wall probably from previous acute diverticulitis and abscess formation that has cleared by scar formation and the stricture  Both ureters were identified and protected throughout the suture  Retroperitoneal medial to lateral dissection was done   The left ureter was identified and the dissection of the sigmoid colon and descending colon were made on the retroperitoneal area and dissected free from the renal attachments laterally to the lateral wall  The inferior mesenteric artery below the left colic artery, but above the sigmoidal branches, was identified  This was transected using Harmonic scalpel and ligated with Vicryl as needed  The colon was completely freed from the pelvis  The rectal mesentery was transected below the rectosigmoid junction on the rectum proper on preparation for transection of the rectum  [place] 60 mm stapler with blue stapler was inserted through the 12 mm port and placed on the rectum and the rectum was transected securely without any problem  A midline incision 4 cm in length at the level of the umbilicus The fascia was opened and the peritoneum was opened longitudinally and the wound protector was inserted  The colon could be delivered outside the abdominal cavity through this wound protector  The site of anastomosis was depicted on the descending colon where there was no further diverticular disease the bowel lumen and the wall was well pliable  A 2-0 Prolene pursestring suture was applied on the descending colon satisfactorily  [facility] staple instrument anvil was placed above the pursestring and tied securely with satisfactory pursestring around the rods  The bowel was replaced anatomically and the abdominal cavity and the air insufflation of the abdomen was reinstituted  Descending colon would reach into the pelvis for the anastomosis to the top of the rectum without any tension in both end of the bowels had shown satisfactory arterial blood supply  The perineal  inserted the [facility] stapler gun through the anus, which was easily brought up to the stapled stump of the rectum and on opening the spike, the anvil was placed on the spike and closing it the reach of the 2 ends were satisfactory without any tension  The gun was fired and anastomosis was achieved  Both doughnuts were circular and complete   Using a flexible colonoscope and insufflation of the rectum under water, the anastomosis showed no air bubble and the anastomosis seemed quite satisfactory with well formed staples and no bleeding  The abdominal cavity was irrigated clean with a large amount of saline solution  Hemostasis was well affected  Sponges and counts were correct  Gown and gloves were changed and with new instruments abdominal cavity was closed The 12 mm port site was closed using 0 Vicryl suture laparoscopically  Using 1-0 PDS running suture, the fascia on the abdominal midline incision was closed  The subcutaneous area was irrigated clean and the skin was closed using 4-0 Monocryl subcuticular running suture and/or staples  The patient tolerated the procedure well and was transferred to the recovery room in satisfactory condition  Findings:  Diverticular disease    Estimated Blood Loss: Minimal           Drains: none                Specimens: Sigmoid colectomy  Order Name Source Comment Collection Info Order Time   TISSUE EXAM Large Intestine, Sigmoid Colon  Collected By: Jennifer Reyes MD 7/9/2019 10:44 AM            Complications: None; patient tolerated the procedure well             Disposition: PACU            Condition: Stable    Signature:   Jennifer Reyes MD

## 2019-07-09 NOTE — ANESTHESIA PREPROCEDURE EVALUATION
Review of Systems/Medical History  Patient summary reviewed  Chart reviewed      Cardiovascular   Pulmonary       GI/Hepatic  Negative GI/hepatic ROS          Negative  ROS        Endo/Other  Negative endo/other ROS      GYN  Negative gynecology ROS          Hematology  Negative hematology ROS      Musculoskeletal  Negative musculoskeletal ROS        Neurology  Negative neurology ROS      Psychology   Anxiety, Depression ,              Physical Exam    Airway    Mallampati score: II  TM Distance: >3 FB  Neck ROM: full     Dental   No notable dental hx     Cardiovascular  Rhythm: regular, Rate: normal, Cardiovascular exam normal    Pulmonary  Pulmonary exam normal     Other Findings        Anesthesia Plan  ASA Score- 2     Anesthesia Type- general with ASA Monitors  Additional Monitors:   Airway Plan: ETT  Plan Factors-    Induction- intravenous  Postoperative Plan- Plan for postoperative opioid use  Informed Consent- Anesthetic plan and risks discussed with patient  I personally reviewed this patient with the CRNA  Discussed and agreed on the Anesthesia Plan with the CRNA  Tiffanie Melgoza

## 2019-07-09 NOTE — ADDENDUM NOTE
Addendum  created 07/09/19 1146 by Joo Monteiro CRNA    Intraprocedure LDAs edited, LDA properties accepted

## 2019-07-10 LAB
ALBUMIN SERPL BCP-MCNC: 2.7 G/DL (ref 3.5–5)
ALP SERPL-CCNC: 42 U/L (ref 46–116)
ALT SERPL W P-5'-P-CCNC: 31 U/L (ref 12–78)
ANION GAP SERPL CALCULATED.3IONS-SCNC: 5 MMOL/L (ref 4–13)
AST SERPL W P-5'-P-CCNC: 18 U/L (ref 5–45)
BILIRUB SERPL-MCNC: 1.2 MG/DL (ref 0.2–1)
BUN SERPL-MCNC: 8 MG/DL (ref 5–25)
CALCIUM SERPL-MCNC: 7.6 MG/DL (ref 8.3–10.1)
CHLORIDE SERPL-SCNC: 106 MMOL/L (ref 100–108)
CO2 SERPL-SCNC: 28 MMOL/L (ref 21–32)
CREAT SERPL-MCNC: 0.87 MG/DL (ref 0.6–1.3)
ERYTHROCYTE [DISTWIDTH] IN BLOOD BY AUTOMATED COUNT: 12.2 % (ref 11.6–15.1)
GFR SERPL CREATININE-BSD FRML MDRD: 101 ML/MIN/1.73SQ M
GLUCOSE SERPL-MCNC: 137 MG/DL (ref 65–140)
HCT VFR BLD AUTO: 39 % (ref 36.5–49.3)
HGB BLD-MCNC: 13.3 G/DL (ref 12–17)
MAGNESIUM SERPL-MCNC: 1.6 MG/DL (ref 1.6–2.6)
MCH RBC QN AUTO: 31.7 PG (ref 26.8–34.3)
MCHC RBC AUTO-ENTMCNC: 34.1 G/DL (ref 31.4–37.4)
MCV RBC AUTO: 93 FL (ref 82–98)
PHOSPHATE SERPL-MCNC: 2.3 MG/DL (ref 2.7–4.5)
PLATELET # BLD AUTO: 213 THOUSANDS/UL (ref 149–390)
PMV BLD AUTO: 9.3 FL (ref 8.9–12.7)
POTASSIUM SERPL-SCNC: 3.7 MMOL/L (ref 3.5–5.3)
PROT SERPL-MCNC: 6 G/DL (ref 6.4–8.2)
RBC # BLD AUTO: 4.19 MILLION/UL (ref 3.88–5.62)
SODIUM SERPL-SCNC: 139 MMOL/L (ref 136–145)
WBC # BLD AUTO: 8.58 THOUSAND/UL (ref 4.31–10.16)

## 2019-07-10 PROCEDURE — 84100 ASSAY OF PHOSPHORUS: CPT | Performed by: PHYSICIAN ASSISTANT

## 2019-07-10 PROCEDURE — 80053 COMPREHEN METABOLIC PANEL: CPT | Performed by: PHYSICIAN ASSISTANT

## 2019-07-10 PROCEDURE — 99024 POSTOP FOLLOW-UP VISIT: CPT | Performed by: PHYSICIAN ASSISTANT

## 2019-07-10 PROCEDURE — 83735 ASSAY OF MAGNESIUM: CPT | Performed by: PHYSICIAN ASSISTANT

## 2019-07-10 PROCEDURE — 85027 COMPLETE CBC AUTOMATED: CPT | Performed by: PHYSICIAN ASSISTANT

## 2019-07-10 RX ADMIN — HYDROCODONE BITARTRATE AND ACETAMINOPHEN 2 TABLET: 5; 325 TABLET ORAL at 09:29

## 2019-07-10 RX ADMIN — CLONAZEPAM 1 MG: 1 TABLET ORAL at 08:30

## 2019-07-10 RX ADMIN — HYDROCODONE BITARTRATE AND ACETAMINOPHEN 2 TABLET: 5; 325 TABLET ORAL at 21:38

## 2019-07-10 RX ADMIN — HEPARIN SODIUM 5000 UNITS: 5000 INJECTION INTRAVENOUS; SUBCUTANEOUS at 21:29

## 2019-07-10 RX ADMIN — HYDROMORPHONE HYDROCHLORIDE 1 MG: 1 INJECTION, SOLUTION INTRAMUSCULAR; INTRAVENOUS; SUBCUTANEOUS at 14:45

## 2019-07-10 RX ADMIN — SODIUM CHLORIDE 125 ML/HR: 0.9 INJECTION, SOLUTION INTRAVENOUS at 04:48

## 2019-07-10 RX ADMIN — FLUOXETINE 40 MG: 20 CAPSULE ORAL at 08:30

## 2019-07-10 RX ADMIN — HYDROCODONE BITARTRATE AND ACETAMINOPHEN 2 TABLET: 5; 325 TABLET ORAL at 04:59

## 2019-07-10 RX ADMIN — HEPARIN SODIUM 5000 UNITS: 5000 INJECTION INTRAVENOUS; SUBCUTANEOUS at 13:11

## 2019-07-10 RX ADMIN — HEPARIN SODIUM 5000 UNITS: 5000 INJECTION INTRAVENOUS; SUBCUTANEOUS at 05:06

## 2019-07-10 RX ADMIN — ARIPIPRAZOLE 5 MG: 5 TABLET ORAL at 17:07

## 2019-07-10 RX ADMIN — POTASSIUM PHOSPHATE, MONOBASIC AND POTASSIUM PHOSPHATE, DIBASIC 30 MMOL: 224; 236 INJECTION, SOLUTION INTRAVENOUS at 10:32

## 2019-07-10 RX ADMIN — CLONAZEPAM 1 MG: 1 TABLET ORAL at 17:07

## 2019-07-10 NOTE — SOCIAL WORK
LOS: 1  Patient is not a Medicare Bundled or 30 day readmission patient  Mett with patient with his parents present to discuss the role of Care Management  Patient resides with his wife in a 2 story home with 3 MENDY  He plans to sleep on the couch once discharged home  There is a PW on the 1st floor  Patient is independent of ADL's  He uses no assistive device, he is employed full time and drives  He uses [employer] in [facility] for his meds  He is employed full time  He has a [facility] admission at [facility] in November 2018  He reports no history of  VNA services, drug and alcohol or  SNF rehab stay  He did recieve intensive outpatient alcohol rehab over 4 years ago and has not drank alcohol since  He has no Advanced Directive or POA,  Information on AD/POA was given to jessica by EDWARD  He plans to return home and anticipates no dicsharge needs

## 2019-07-10 NOTE — PROGRESS NOTES
Progress Note - General Surgery   Wilfrid Ibarra 47 y o  male MRN: [id number]  Unit/Bed#: [address] [place] [address] [id number] Encounter: [id number]    Assessment/Plan:  History of complicated diverticulitis  Postop day 1 status post laparoscopic sigmoid colectomy with colo colo anastomosis, intraoperative colonoscopy  -patient doing well, tolerating clears, passing some flatus  -advanced to full liquid diet, surgical soft for dinner as seble  -DC Charles catheter  -pain control as needed, encourage orals  -encourage out of bed, ambulation, incentive spirometer  -heparin for DVT prophylaxis  -monitor bowel function    Hypophosphatemia  -replace, monitor electrolytes    Psychiatric disorder-stable  -continue psychiatric medications        Subjective/Objective   Chief Complaint:  Pain    Subjective:  Patient complains of incisional pain and some gas pain today  He is passing some flatus  Otherwise tolerating clear liquids without nausea or vomiting  Not out of bed  VSS and Urine output good  Objective:     Blood pressure 117/73, pulse 88, temperature 98 6 °F (37 °C), temperature source Oral, resp  rate 18, height 5' 10 8" (1 798 m), weight 91 5 kg (201 lb 11 5 oz), SpO2 90 %  ,Body mass index is 28 29 kg/m²        Intake/Output Summary (Last 24 hours) at 7/10/2019 1051  Last data filed at 7/10/2019 0501  Gross per 24 hour   Intake 1480 ml   Output 1980 ml   Net -500 ml       Invasive Devices     Peripheral Intravenous Line            Peripheral IV 07/09/19 Right Hand 1 day          Drain            Urethral Catheter Latex 16 Fr  1 day                Physical Exam:   General appearance: alert and oriented, in no acute distress  Head: Normocephalic, without obvious abnormality, atraumatic, sclerae anicteric, mucous membranes moist  Neck: no JVD and supple, symmetrical, trachea midline  Lungs: clear to auscultation, no wheezes or rales  Heart:   Regular rate and rhythm, S1-S2 normal, no murmur  Abdomen:   Soft, mildly distended, tender over incision sites, active bowel sounds  Extremities:   No edema, redness or tenderness in the calves or thighs  Skin: Warm, dry; incision sites clean, dry intact  Nursing notes and vital signs reviewed  }    Lab, Imaging and other studies:  I have personally reviewed pertinent lab results    , CBC:   Lab Results   Component Value Date    WBC 8 58 07/10/2019    HGB 13 3 07/10/2019    HCT 39 0 07/10/2019    MCV 93 07/10/2019     07/10/2019    MCH 31 7 07/10/2019    MCHC 34 1 07/10/2019    RDW 12 2 07/10/2019    MPV 9 3 07/10/2019   , CMP:   Lab Results   Component Value Date    SODIUM 139 07/10/2019    K 3 7 07/10/2019     07/10/2019    CO2 28 07/10/2019    BUN 8 07/10/2019    CREATININE 0 87 07/10/2019    CALCIUM 7 6 (L) 07/10/2019    AST 18 07/10/2019    ALT 31 07/10/2019    ALKPHOS 42 (L) 07/10/2019    EGFR 101 07/10/2019     VTE Pharmacologic Prophylaxis: Heparin  VTE Mechanical Prophylaxis: sequential compression device     Leidy Riggins PA-C

## 2019-07-10 NOTE — PROGRESS NOTES
07/10/19 1300   Clinical Encounter Type   Visited With Patient and family together   Routine Visit Introduction

## 2019-07-10 NOTE — UTILIZATION REVIEW
Initial Clinical Review    Elective INPT surgical procedure  Age/Sex: 47 y o  male  Surgery Date: 7/9/19  Procedure: Laparoscopic sigmoid colon resection with colorectal anastomosis, intra-op colonoscopy CPT 21550  Anesthesia:General endotracheal anesthesia   Operative Findings: Diverticular disease  Admission Orders: Date/Time/Statement: INPT 7/9/19 @ 1250   Orders Placed This Encounter   Procedures    Inpatient Admission     Standing Status:   Standing     Number of Occurrences:   1     Order Specific Question:   Admitting Physician     Answer:   Torin Delacruz     Order Specific Question:   Level of Care     Answer:   Med Surg [16]     Order Specific Question:   Estimated length of stay     Answer:   More than 2 Midnights     Order Specific Question:   Certification     Answer:   I certify that inpatient services are medically necessary for this patient for a duration of greater than two midnights  See H&P and MD Progress Notes for additional information about the patient's course of treatment       Vital Signs: /73 (BP Location: Right arm)   Pulse 88   Temp 98 6 °F (37 °C) (Oral)   Resp 18   Ht 5' 10 8" (1 798 m)   Wt 91 5 kg (201 lb 11 5 oz)   SpO2 90%   BMI 28 29 kg/m²   07/09/19 1316  98 2 °F (36 8 °C)  90  16  125/89  97 %  Nasal cannula    Lying   07/09/19 1245  98 °F (36 7 °C)  88  16  120/82  98 %  Nasal cannula    Lying   07/09/19 1228  98 5 °F (36 9 °C)  78  14  119/70  96 %         07/09/19 1213    74  12  111/69  97 %         07/09/19 1158    66  14  115/71  96 %         07/09/19 1143    80  14  122/72  96 %         07/09/19 1128  99 2 °F (37 3 °C)  77  14  122/64  97 %  Nasal cannula  WDL     07/09/19 0742  98 1 °F (36 7 °C)  79  16  127/75  96 %  None (Room air)         07/09/19 0700 - 07/10/19 0659    Intake (ml) 2480   Output (ml) 2525   Net (ml) -45   Last Weight 91 5 kg (201 lb 11 5 oz)       Diet: CLEAR LIQ DIET  Mobility: AMBULATE  DVT Prophylaxis: SEQ COMP DEVICE    IS  I&O    Scheduled Meds:  Current Facility-Administered Medications:  ARIPiprazole 5 mg Oral QPM     clonazePAM 1 mg Oral BID     FLUoxetine 40 mg Oral Daily     heparin (porcine) 5,000 Units Subcutaneous Q8H [address]     HYDROcodone-acetaminophen 2 tablet Oral Q4H PRN 7/9 X 1, 7/10 X 1    HYDROmorphone 1 mg Intravenous Q3H PRN 7/9 X 1    lactated ringers 125 mL/hr Intravenous Continuous     ondansetron 4 mg Intravenous Q6H PRN     sodium chloride 125 mL/hr Intravenous Continuous       Network Utilization Review Department  Phone: [phone]; Fax [phone]  [email or web address]  ATTENTION: Please call with any questions or concerns to [phone]  and carefully listen to the prompts so that you are directed to the right person  Send all requests for admission clinical reviews, approved or denied determinations and any other requests to fax [phone]   All voicemails are confidential

## 2019-07-10 NOTE — PROGRESS NOTES
07/10/19 [address] Involvement Patient active with Jew;Yazidism active in [address] Aware/Contacted Leader/Jew aware of patient's status   Stress Factors   Patient Stress Factors Health changes   Coping Responses   Patient Coping Accepting   Plan of Care   Comments   (His  provides spiritualsupport when needed )   Assessment Completed by: Unit visit

## 2019-07-11 VITALS
TEMPERATURE: 98.1 F | HEART RATE: 85 BPM | BODY MASS INDEX: 28.24 KG/M2 | RESPIRATION RATE: 18 BRPM | OXYGEN SATURATION: 98 % | HEIGHT: 71 IN | WEIGHT: 201.72 LBS | DIASTOLIC BLOOD PRESSURE: 75 MMHG | SYSTOLIC BLOOD PRESSURE: 117 MMHG

## 2019-07-11 LAB
ANION GAP SERPL CALCULATED.3IONS-SCNC: 8 MMOL/L (ref 4–13)
BUN SERPL-MCNC: 8 MG/DL (ref 5–25)
CALCIUM SERPL-MCNC: 8.3 MG/DL (ref 8.3–10.1)
CHLORIDE SERPL-SCNC: 106 MMOL/L (ref 100–108)
CO2 SERPL-SCNC: 28 MMOL/L (ref 21–32)
CREAT SERPL-MCNC: 0.8 MG/DL (ref 0.6–1.3)
GFR SERPL CREATININE-BSD FRML MDRD: 104 ML/MIN/1.73SQ M
GLUCOSE SERPL-MCNC: 96 MG/DL (ref 65–140)
MAGNESIUM SERPL-MCNC: 1.8 MG/DL (ref 1.6–2.6)
PHOSPHATE SERPL-MCNC: 2.5 MG/DL (ref 2.7–4.5)
POTASSIUM SERPL-SCNC: 3.9 MMOL/L (ref 3.5–5.3)
SODIUM SERPL-SCNC: 142 MMOL/L (ref 136–145)

## 2019-07-11 PROCEDURE — 99024 POSTOP FOLLOW-UP VISIT: CPT | Performed by: PHYSICIAN ASSISTANT

## 2019-07-11 PROCEDURE — 80048 BASIC METABOLIC PNL TOTAL CA: CPT | Performed by: PHYSICIAN ASSISTANT

## 2019-07-11 PROCEDURE — NC001 PR NO CHARGE: Performed by: PHYSICIAN ASSISTANT

## 2019-07-11 PROCEDURE — 83735 ASSAY OF MAGNESIUM: CPT | Performed by: PHYSICIAN ASSISTANT

## 2019-07-11 PROCEDURE — 84100 ASSAY OF PHOSPHORUS: CPT | Performed by: PHYSICIAN ASSISTANT

## 2019-07-11 RX ORDER — HYDROCODONE BITARTRATE AND ACETAMINOPHEN 5; 325 MG/1; MG/1
1-2 TABLET ORAL EVERY 6 HOURS PRN
Qty: 20 TABLET | Refills: 0 | Status: SHIPPED | OUTPATIENT
Start: 2019-07-11 | End: 2019-07-19 | Stop reason: ALTCHOICE

## 2019-07-11 RX ADMIN — CLONAZEPAM 1 MG: 1 TABLET ORAL at 08:00

## 2019-07-11 RX ADMIN — FLUOXETINE 40 MG: 20 CAPSULE ORAL at 08:00

## 2019-07-11 RX ADMIN — HEPARIN SODIUM 5000 UNITS: 5000 INJECTION INTRAVENOUS; SUBCUTANEOUS at 14:36

## 2019-07-11 RX ADMIN — SODIUM PHOSPHATE, MONOBASIC, MONOHYDRATE 30 MMOL: 276; 142 INJECTION, SOLUTION INTRAVENOUS at 11:14

## 2019-07-11 RX ADMIN — HYDROCODONE BITARTRATE AND ACETAMINOPHEN 2 TABLET: 5; 325 TABLET ORAL at 07:54

## 2019-07-11 RX ADMIN — HEPARIN SODIUM 5000 UNITS: 5000 INJECTION INTRAVENOUS; SUBCUTANEOUS at 05:02

## 2019-07-11 NOTE — DISCHARGE INSTRUCTIONS
Kierra Duckworth Instructions              Dr Freire Signs  M D     1  General: Eduin Pardo will feel pulling sensations around the wound and/or aches and pains around the incisions  This is normal  Even minor surgery is a change in your body and this is your bodys way of reaction to it  If you have had abdominal surgery, it may help to support the incision with a small pillow or blanket for comfort when moving or coughing  2  Wound care:     Glue - Leave glue alone, it will fall off on its own, no need for an additional dressings    3  Water: You may shower over the wound, unless there are drain tubes left in place  Do not bathe or use a pool or hot tub until cleared by the physician  You may shower right over the staples, glue or Steri-Strips and rinse wound with soapy water but do not scrub incision pat dry when you are done  4  Activity: You may go up and down stairs, walk as much as you are comfortable, but walk at least 3 times each day  If you have had abdominal or hernia surgery, do not lift anything heavier than 15 pounds for at least 2 weeks and nothing more than 25 pounds for weeks 3 and 4, unless cleared by the doctor  5  Diet: You may resume a low residual/low fiber diet x 2 weeks  Then high fiber diet  6  Medications: Resume all of your previous medications, unless told otherwise by the doctor  Avoid aspirin products for 3-5 days after the date of surgery  You may, at that time, began to take them again  Tylenol and ibuoprofen is always fine, unless you are taking any narcotic pain medication containing Tylenol (such as Percocet, Darvocet, Vicodin, or anything containing acetaminophen)  Do not take Tylenol if you're taking these medications  You do not need to take the narcotic pain medications unless you are having significant pain and discomfort  7  Driving: He will need someone to drive you home on the day of surgery   Do not drive or make any important decisions while on narcotic pain medication or 24 hours and after anesthesia or sedation for surgery  Generally, you may drive when your off all narcotic pain medications  8  Upset Stomach: You may take Maalox, Tums, or similar items for an upset stomach  If your narcotic pain medication causes an upset stomach, do not take it on an empty stomach  Try taking it with at least some crackers or toast      9  Constipation: Patients often experienced constipation after surgery  You may take over-the-counter medication for this, such as Metamucil, Senokot, Dulcolax, milk of magnesia, etc  You may take a suppository unless you have had anorectal surgery such as a procedure on your hemorrhoids  If you experience significant nausea or vomiting after abdominal surgery, call the office before trying any of these medications  10  Call the office: If you are experiencing any of the following, fevers above 101 5°, significant nausea or vomiting, if the wound develops drainage and/or is excessive redness around the wound, or if you have significant diarrhea or other worsening symptoms  11  Pain: You may be given a prescription for pain  This will be given to the hospital, the day of surgery  12  Sexual Activity: You may resume sexual activity when you feel ready and comfortable and your incision is sealed and healed without apparent infection risk  [facility]  Phone: [phone]      Low Fiber Diet   WHAT YOU NEED TO KNOW:   A low-fiber diet limits foods that are high in fiber  Fiber is the part of fruits, vegetables, and grains that is not broken down by your body  You may need to follow this diet after surgery on your intestines  You may also need to follow this diet for certain conditions such as Crohn disease or ulcerative colitis  Ask your healthcare provider or dietitian how much fiber you can have each day     DISCHARGE INSTRUCTIONS:   Foods to include:  Read food labels to check the amount of fiber that are found in foods  Some foods that are low in fiber include the following:  · Grains:  Choose grains that have less than 2 grams of fiber in each serving  Examples include the following:     ¨ Cream of wheat and finely ground grits    ¨ Dry cereal made from rice     ¨ White bread, white pasta, and white rice    ¨ Crackers, bagels, and rolls made from white or refined flour    · Other foods:      ¨ Canned and well-cooked fruit without skins or seeds, and juice without pulp    ¨ Ripe bananas and melons    ¨ Canned and well-cooked vegetables without skins or seeds, and vegetable juice    ¨ Cow's milk, lactose-free milk, soy milk, and rice milk    ¨ Yogurt without nuts, fruit, or granola    ¨ Eggs, poultry (such as chicken and turkey), fish, and tender, ground, well-cooked beef     ¨ Tofu and smooth peanut butter    ¨ Broth and strained soups made of low-fiber foods  Foods to avoid:   · Breads, cereals, crackers, and pasta made with whole wheat or whole grains (such as whole oats)    · [employer], wild rice, quinoa, kasha, and barley    · All fresh fruit with skin, except banana and melons     · Dried fruits and fruit juice with pulp    · Canned pineapple    · Raw vegetables    · Nuts, seeds, and popcorn    · Beans, nuts, peas, and lentils    · Tough meats    · Coconut and avocado  What else you should know about a low-fiber diet:  A low-fiber diet can decrease the amount of bowel movements you have  Drink liquids as directed to avoid constipation  Ask how much liquid to drink each day and which liquids are best for you  © 2017 [address] Information is for End User's use only and may not be sold, redistributed or otherwise used for commercial purposes  All illustrations and images included in CareNotes® are the copyrighted property of [employer]  or Heber Waterman  The above information is an  only  It is not intended as medical advice for individual conditions or treatments  Talk to your doctor, nurse or pharmacist before following any medical regimen to see if it is safe and effective for you

## 2019-07-11 NOTE — NURSING NOTE
Patient to be discharged to home in stable condition  Spouse to take him home  Instructions given to patient

## 2019-07-11 NOTE — PROGRESS NOTES
Progress Note - General Surgery   Theresa Becker 47 y o  male MRN: [id number]  Unit/Bed#: [address] [place] [address] [id number] Encounter: [id number]    Assessment/Plan:  History of complicated diverticulitis  POD#2 s/p laparoscopic sigmoid colectomy with colo/colo anastomosis, intraoperative colonoscopy  Passing flatus  Ambulating hallways  Tolerating low fiber diet with n/v  Po pain control  D/C to home today    Hypophosphatemia  Continue to replace    Psychiatric disorder - stable  Continue medications    Subjective/Objective   Chief Complaint: Feeling good today    Subjective: Reports he feels good, had some flatus last night, then had some distention which improved this am  Ambulating without difficulty  Tolerating diet  Objective:     Blood pressure 134/84, pulse 95, temperature 97 9 °F (36 6 °C), temperature source Oral, resp  rate 18, height 5' 10 8" (1 798 m), weight 91 5 kg (201 lb 11 5 oz), SpO2 95 %  ,Body mass index is 28 29 kg/m²        Intake/Output Summary (Last 24 hours) at 7/11/2019 0925  Last data filed at 7/10/2019 1500  Gross per 24 hour   Intake    Output 500 ml   Net -500 ml       Invasive Devices     Peripheral Intravenous Line            Peripheral IV 07/09/19 Right Hand 2 days                Physical Exam: General appearance: alert and oriented, in no acute distress and sitting up on side of bed  Lungs: clear to auscultation bilaterally and without wheezes, crackles, or rhonchi  Heart: regular rate and rhythm, S1, S2 normal, no murmur, click, rub or gallop  Abdomen: soft, incisional tenderness, dependent ecchymosis at umbilical incision, bowel sounds active    Lab, Imaging and other studies:  CBC: No results found for: WBC, HGB, HCT, MCV, PLT, ADJUSTEDWBC, MCH, MCHC, RDW, MPV, NRBC, CMP:   Lab Results   Component Value Date    SODIUM 142 07/11/2019    K 3 9 07/11/2019     07/11/2019    CO2 28 07/11/2019    BUN 8 07/11/2019    CREATININE 0 80 07/11/2019    CALCIUM 8 3 07/11/2019    EGFR 104 07/11/2019     VTE Pharmacologic Prophylaxis: Heparin  VTE Mechanical Prophylaxis: sequential compression device

## 2019-07-12 ENCOUNTER — TRANSITIONAL CARE MANAGEMENT (OUTPATIENT)
Dept: FAMILY MEDICINE CLINIC | Facility: CLINIC | Age: 49
End: 2019-07-12

## 2019-07-12 ENCOUNTER — TELEPHONE (OUTPATIENT)
Dept: SURGERY | Facility: HOSPITAL | Age: 49
End: 2019-07-12

## 2019-07-12 NOTE — TELEPHONE ENCOUNTER
Two day post op call  Left message for patient  Asked how patient was feeling if there were any questions or concerns he should call the office  Reminded him of post op appointment and to call if any questions or problems

## 2019-07-16 DIAGNOSIS — F41.1 GENERALIZED ANXIETY DISORDER: ICD-10-CM

## 2019-07-16 RX ORDER — CLONAZEPAM 1 MG/1
TABLET ORAL
Qty: 60 TABLET | Refills: 0 | Status: SHIPPED | OUTPATIENT
Start: 2019-07-16 | End: 2019-08-15 | Stop reason: SDUPTHER

## 2019-07-18 NOTE — DISCHARGE SUMMARY
Discharge Summary - Kae Nielson 47 y o  male MRN: [id number]    Unit/Bed#: [address] [place] [id number] Encounter: [id number]    Admission Date: 7/9/2019     Shermanharis Prow Date: 07/11/19  Admitting Diagnosis: Diverticulitis of sigmoid colon [K57 32]    Secondary Diagnosis:   Past Medical History:   Diagnosis Date    Anxiety     Asthma     Depression     Diverticulitis     Hyperlipemia     RESOLVED [id number]    Medial meniscus tear     LAST ASSESSED [id number]    Psychiatric disorder     Psychiatric illness     [facility]icide attempt [facility])        Discharge Diagnosis: Same    Procedures Performed: Procedure(s):  RESECTION COLON SIGMOID LAPAROSCOPIC WITH ANASTOMOSIS: INTRAOP COLONOSCOPY    Consults: none    Hospital Course: Patient is a 48 yo male with history of diverticulitis who presented to [facility] for laparoscopic sigmoid colon resection on 7/9/19  He underwent the procedure that day and was admitted post operatively for pain and diet management  The next day he was tolerating a clear liquid diet and was advanced that evening to a surgical soft diet  Post operative day 2 he was tolerating his surgical soft diet, passing flatus and ambulating without difficulty  He was able to tolerate PO pain medications and so was discharged home in stable condition  Disposition: home in stable condition  Call physician for temperature over 101, wound redness or discharge, vomiting, or intolerance to diet  Discharge Medications:  See after visit summary for reconciled discharge medications provided to patient and family  This text is generated with voice recognition software  There may be translation, syntax,  or grammatical errors  If you have any questions, please contact the dictating provider

## 2019-07-19 ENCOUNTER — OFFICE VISIT (OUTPATIENT)
Dept: FAMILY MEDICINE CLINIC | Facility: CLINIC | Age: 49
End: 2019-07-19
Payer: COMMERCIAL

## 2019-07-19 VITALS
HEIGHT: 70 IN | DIASTOLIC BLOOD PRESSURE: 82 MMHG | SYSTOLIC BLOOD PRESSURE: 126 MMHG | BODY MASS INDEX: 26.34 KG/M2 | WEIGHT: 184 LBS | OXYGEN SATURATION: 97 % | HEART RATE: 85 BPM

## 2019-07-19 DIAGNOSIS — K57.30 DIVERTICULOSIS OF LARGE INTESTINE WITHOUT HEMORRHAGE: Primary | ICD-10-CM

## 2019-07-19 DIAGNOSIS — F32.1 MODERATE SINGLE CURRENT EPISODE OF MAJOR DEPRESSIVE DISORDER (HCC): ICD-10-CM

## 2019-07-19 DIAGNOSIS — F41.1 GENERALIZED ANXIETY DISORDER: ICD-10-CM

## 2019-07-19 PROBLEM — K57.92 DIVERTICULITIS: Status: RESOLVED | Noted: 2019-04-22 | Resolved: 2019-07-19

## 2019-07-19 PROCEDURE — 1111F DSCHRG MED/CURRENT MED MERGE: CPT | Performed by: FAMILY MEDICINE

## 2019-07-19 PROCEDURE — 99495 TRANSJ CARE MGMT MOD F2F 14D: CPT | Performed by: FAMILY MEDICINE

## 2019-07-19 NOTE — PROGRESS NOTES
[address]        NAME: Gary Wilkins is a 47 y o  male  : 1970    MRN: [id number]  DATE: 2019  TIME: 9:51 AM    Assessment and Plan   Diverticulosis of large intestine without hemorrhage [K57 30]  1  Diverticulosis of large intestine without hemorrhage     2  Moderate single current episode of major depressive disorder ([facility] [address] )     3  Generalized anxiety disorder         No problem-specific Assessment & Plan notes found for this encounter  Patient Instructions     Patient Instructions   Continue current medications  Follow up with general surgeon  Recheck 3 months for depression  Chief Complaint     Chief Complaint   Patient presents with    Transition of Care Management     d/c 19    Follow-up     6mon med check      TCM Call (since 2019)     Date and time call was made  2019  3:08 PM    Patient was hospitialized at  [facility]    Date of Admission  19    Date of discharge  19    Diagnosis  Diverticulitis    Disposition  Home    Were the patients medications reviewed and updated  Yes    Current Symptoms  None      TCM Call (since 2019)     Post hospital issues  None    Scheduled for follow up? Yes    Did you obtain your prescribed medications  Yes    Do you need help managing your prescriptions or medications  No    Is transportation to your appointment needed  No    I have advised the patient to call PCP with any new or worsening symptoms  daysiris cole    Living Arrangements  Spouse or Significiant other    Are you recieving any outpatient services  No    Are you recieving home care services  No    Are you using any community resources  No    Interperter language line needed  No            History of Present Illness       Patient here for TCM visit following colon resection due to diverticulitis/diverticulosis  Patient is back to work  Minimal pain  Medications are reconciled        Review of Systems Review of Systems   Constitutional: Negative for appetite change, chills, diaphoresis, fatigue and fever  Respiratory: Negative for cough, choking and shortness of breath  Cardiovascular: Negative for chest pain and palpitations  Gastrointestinal: Negative for abdominal distention, abdominal pain, blood in stool, constipation, diarrhea, nausea, rectal pain and vomiting  Genitourinary: Negative for dysuria, flank pain and frequency  Neurological: Negative for dizziness, syncope and light-headedness  Psychiatric/Behavioral: Negative for decreased concentration and dysphoric mood  The patient is nervous/anxious            Current Medications       Current Outpatient Medications:     ARIPiprazole (ABILIFY) 5 mg tablet, TAKE 1 TABLET BY MOUTH ONCE DAILY (Patient taking differently: TAKE 1 TABLET BY MOUTH Every Evening), Disp: 90 tablet, Rfl: 0    clonazePAM (KlonoPIN) 1 mg tablet, TAKE 1 TABLET BY MOUTH TWICE DAILY AT  9  AM  AND  6  PM, Disp: 60 tablet, Rfl: 0    FLUoxetine (PROzac) 40 MG capsule, Take 1 capsule (40 mg total) by mouth daily, Disp: 30 capsule, Rfl: 5    melatonin 3 mg, Take 3-6mg by mouth daily at bedtime as needed for insomnia, Disp: 1 tablet, Rfl: 0    Current Allergies     Allergies as of 07/19/2019 - Reviewed 07/19/2019   Allergen Reaction Noted    Erythromycin Diarrhea 12/02/2016            The following portions of the patient's history were reviewed and updated as appropriate: allergies, current medications, past family history, past medical history, past social history, past surgical history and problem list      Past Medical History:   Diagnosis Date    Anxiety     Asthma     Depression     Diverticulitis     Hyperlipemia     RESOLVED [id number]    Medial meniscus tear     LAST ASSESSED [id number]    Psychiatric disorder     Psychiatric illness     Suicide attempt [facility])        Past Surgical History:   Procedure Laterality Date    ARTHROSCOPY KNEE Right     ONSET 7/3/2014    NE LAP,SURG,COLECTOMY, PARTIAL, W/ANAST N/A 7/9/2019    Procedure: RESECTION COLON SIGMOID LAPAROSCOPIC WITH ANASTOMOSIS: INTRAOP COLONOSCOPY;  Surgeon: Lovette Ganser, MD;  Location: [facility];  Service: General    TONSILLECTOMY AND ADENOIDECTOMY      TOOTH EXTRACTION      WISDOM TEETH       Family History   Problem Relation Age of Onset    Diabetes Mother     Hypertension Mother     Prostate cancer Father     Substance Abuse Neg Hx     Mental illness Neg Hx          Medications have been verified  Objective   /82   Pulse 85   Ht 5' 10" (1 778 m)   Wt 83 5 kg (184 lb)   SpO2 97%   BMI 26 40 kg/m²        Physical Exam     Physical Exam   Constitutional: He is oriented to person, place, and time  He appears well-developed and well-nourished  No distress  HENT:   Head: Normocephalic and atraumatic  Right Ear: Tympanic membrane and external ear normal  No drainage  Left Ear: Tympanic membrane normal  No drainage  Mouth/Throat: No oropharyngeal exudate  Eyes: Conjunctivae and EOM are normal  Right eye exhibits no discharge  Left eye exhibits no discharge  Neck: Normal range of motion  Neck supple  No thyromegaly present  Cardiovascular: Normal rate, regular rhythm and normal heart sounds  Pulmonary/Chest: Effort normal  No respiratory distress  He has no wheezes  He has no rales  Lymphadenopathy:     He has no cervical adenopathy  Neurological: He is alert and oriented to person, place, and time  Psychiatric: He has a normal mood and affect   His behavior is normal

## 2019-07-25 ENCOUNTER — OFFICE VISIT (OUTPATIENT)
Dept: SURGERY | Facility: HOSPITAL | Age: 49
End: 2019-07-25

## 2019-07-25 VITALS — WEIGHT: 183.8 LBS | HEIGHT: 70 IN | TEMPERATURE: 98.8 F | BODY MASS INDEX: 26.31 KG/M2

## 2019-07-25 DIAGNOSIS — Z09 POSTOP CHECK: Primary | ICD-10-CM

## 2019-07-25 PROCEDURE — 99024 POSTOP FOLLOW-UP VISIT: CPT | Performed by: SURGERY

## 2019-07-25 NOTE — PROGRESS NOTES
Seen and examined no acute events doing well no issues  AVSS Afebrile  Soft +BS ND NT incision cdi    S/p lap sigmoid   Path reviewed consistent with diverticulitis, cont light duty for two more weeks, high fiber for two more weeks, f/u prn

## 2019-08-15 DIAGNOSIS — F41.1 GENERALIZED ANXIETY DISORDER: ICD-10-CM

## 2019-08-19 RX ORDER — CLONAZEPAM 1 MG/1
TABLET ORAL
Qty: 60 TABLET | Refills: 1 | Status: SHIPPED | OUTPATIENT
Start: 2019-08-19 | End: 2019-09-24 | Stop reason: SDUPTHER

## 2019-09-04 DIAGNOSIS — F41.1 GENERALIZED ANXIETY DISORDER: ICD-10-CM

## 2019-09-04 DIAGNOSIS — F32.1 MODERATE SINGLE CURRENT EPISODE OF MAJOR DEPRESSIVE DISORDER (HCC): ICD-10-CM

## 2019-09-04 RX ORDER — ARIPIPRAZOLE 5 MG/1
TABLET ORAL
Qty: 90 TABLET | Refills: 0 | Status: SHIPPED | OUTPATIENT
Start: 2019-09-04 | End: 2019-11-27 | Stop reason: SDUPTHER

## 2019-09-23 DIAGNOSIS — F41.1 GENERALIZED ANXIETY DISORDER: ICD-10-CM
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
## 2019-09-23 NOTE — TELEPHONE ENCOUNTER
I can okay 1 time refill, insurance company or pharmacy may refuse  He will also probably need to pay cash

## 2019-09-24 RX ORDER — CLONAZEPAM 1 MG/1
TABLET ORAL
Qty: 60 TABLET | Refills: 0 | Status: SHIPPED | OUTPATIENT
Start: 2019-09-24 | End: 2019-10-15 | Stop reason: ALTCHOICE

## 2019-10-02 ENCOUNTER — HOSPITAL ENCOUNTER (EMERGENCY)
Facility: HOSPITAL | Age: 49
Discharge: HOME | End: 2019-10-02
Attending: EMERGENCY MEDICINE
Payer: COMMERCIAL

## 2019-10-02 VITALS
OXYGEN SATURATION: 96 % | RESPIRATION RATE: 20 BRPM | DIASTOLIC BLOOD PRESSURE: 89 MMHG | BODY MASS INDEX: 26.26 KG/M2 | HEART RATE: 99 BPM | SYSTOLIC BLOOD PRESSURE: 138 MMHG | WEIGHT: 183 LBS | TEMPERATURE: 97.7 F

## 2019-10-02 DIAGNOSIS — S61.419A HAND LACERATION: Primary | ICD-10-CM

## 2019-10-02 PROCEDURE — 99282 EMERGENCY DEPT VISIT SF MDM: CPT | Performed by: EMERGENCY MEDICINE

## 2019-10-02 PROCEDURE — 99282 EMERGENCY DEPT VISIT SF MDM: CPT

## 2019-10-02 PROCEDURE — 12001 RPR S/N/AX/GEN/TRNK 2.5CM/<: CPT | Performed by: EMERGENCY MEDICINE

## 2019-10-02 NOTE — ED PROVIDER NOTES
History  Chief Complaint   Patient presents with    Hand Laceration     Patient stae she cut his R hand with a picture frame earlier today  No bleeding noted     Cut dorsal aspect of the right hand on glass at 9 o'clock this morning last tetanus is up-to-date he has a 2 cm laceration to the dorsal aspect of the right hand pulses and gross sensation are intact no obvious foreign body      History provided by:  Patient  Injury   Location:  Right hand  Quality:  Laceration  Severity:  Mild  Onset quality:  Sudden  Timing:  Constant  Progression:  Unchanged  Chronicity:  New  Context:  Right hand laceration from broken glass  Relieved by:  Nothing  Worsened by:  Nothing      Prior to Admission Medications   Prescriptions Last Dose Informant Patient Reported? Taking?    ARIPiprazole (ABILIFY) 5 mg tablet   No Yes   Sig: TAKE 1 TABLET BY MOUTH ONCE DAILY   FLUoxetine (PROzac) 40 MG capsule  Self No Yes   Sig: Take 1 capsule (40 mg total) by mouth daily   clonazePAM (KlonoPIN) 1 mg tablet   No Yes   Sig: Use Previous sig   melatonin 3 mg Not Taking at Unknown time Self No No   Sig: Take 3-6mg by mouth daily at bedtime as needed for insomnia   Patient not taking: Reported on 10/2/2019      Facility-Administered Medications: None       Past Medical History:   Diagnosis Date    Anxiety     Asthma     Depression     Diverticulitis     Hyperlipemia     RESOLVED [id number]    Medial meniscus tear     LAST ASSESSED [id number]    Psychiatric disorder     Psychiatric illness     Suicide attempt [facility])        Past Surgical History:   Procedure Laterality Date    ARTHROSCOPY KNEE Right     ONSET 7/3/2014    NE LAP,SURG,COLECTOMY, PARTIAL, W/ANAST N/A 7/9/2019    Procedure: RESECTION COLON SIGMOID LAPAROSCOPIC WITH ANASTOMOSIS: INTRAOP COLONOSCOPY;  Surgeon: Daja Barfield MD;  Location:  MAIN OR;  Service: General    TONSILLECTOMY AND ADENOIDECTOMY      TOOTH EXTRACTION      WISDOM TEETH       Family History   Problem Relation Age of Onset    Diabetes Mother     Hypertension Mother     Prostate cancer Father     Substance Abuse Neg Hx     Mental illness Neg Hx      I have reviewed and agree with the history as documented  Social History     Tobacco Use    Smoking status: Former Smoker     Packs/day: 1 00     Years: 30 00     Pack years: 30 00     Types: Cigarettes    Smokeless tobacco: Current User    Tobacco comment: quit march 2019   Substance Use Topics    Alcohol use: No     Comment: sober 4 years    Drug use: No        Review of Systems   Skin: Positive for wound  All other systems reviewed and are negative  Physical Exam  Physical Exam   Constitutional: He is oriented to person, place, and time  He appears well-developed and well-nourished  No distress  HENT:   Head: Normocephalic and atraumatic  Right Ear: External ear normal    Left Ear: External ear normal    Eyes: Pupils are equal, round, and reactive to light  EOM are normal  Right eye exhibits no discharge  Left eye exhibits no discharge  No scleral icterus  Neck: Neck supple  No JVD present  No tracheal deviation present  Cardiovascular: Normal rate, regular rhythm and intact distal pulses  Exam reveals no gallop and no friction rub  No murmur heard  Pulmonary/Chest: Effort normal and breath sounds normal  No stridor  No respiratory distress  He has no wheezes  Abdominal: Soft  Bowel sounds are normal  He exhibits no distension  There is no tenderness  Musculoskeletal: Normal range of motion  He exhibits no edema, tenderness or deformity  Neurological: He is alert and oriented to person, place, and time  No cranial nerve deficit or sensory deficit  He exhibits normal muscle tone  Coordination normal    Skin: Skin is warm  No rash noted  He is not diaphoretic  2 cm laceration to the dorsal aspect of the right hand   Psychiatric: He has a normal mood and affect   His behavior is normal  Thought content normal    Nursing note and vitals reviewed  Vital Signs  ED Triage Vitals [10/02/19 1450]   Temperature Pulse Respirations Blood Pressure SpO2   97 7 °F (36 5 °C) 99 20 138/89 96 %      Temp src Heart Rate Source Patient Position - Orthostatic VS BP Location FiO2 (%)   -- -- -- -- --      Pain Score       4           Vitals:    10/02/19 1450   BP: 138/89   Pulse: 99         Visual Acuity      ED Medications  Medications - No data to display    Diagnostic Studies  Results Reviewed     None                 No orders to display              Procedures  Laceration repair  Date/Time: 10/2/2019 3:15 PM  Performed by: Cali Hendricks DO  Authorized by: Cali Hendricks DO   Laceration length: 2 cm      Procedure Details:  Amount of cleaning: standard  Skin closure: glue and Steri-Strips  Number of sutures: 3 Steri-Strips  Patient tolerance: Patient tolerated the procedure well with no immediate complications             ED Course                               MDM    Disposition  Final diagnoses:   Hand laceration - Dorsal aspect right hand from glass     Time reflects when diagnosis was documented in both MDM as applicable and the Disposition within this note     Time User Action Codes Description Comment    10/2/2019  3:22 PM Graylon Maribell Add [L78 [id number]] Hand laceration     10/2/2019  3:22 PM Graylon Maribell Modify [M00 [id number]] Hand laceration Dorsal aspect right hand from glass      ED Disposition     ED Disposition Condition Date/Time Comment    Discharge Stable Wed Oct 2, 2019  3:22 PM Disha Pantoja discharge to home/self care              Follow-up Information     Follow up With Specialties Details Why Contact Info    Jamari Stuart MD Family Medicine  As needed [address]  [address] [facility] [address]  [address] [facility]  [phone]            Discharge Medication List as of 10/2/2019  3:23 PM      CONTINUE these medications which have NOT CHANGED    Details   ARIPiprazole (ABILIFY) 5 mg tablet TAKE 1 TABLET BY MOUTH ONCE DAILY, Normal      clonazePAM (KlonoPIN) 1 mg tablet Use Previous sig, Normal      FLUoxetine (PROzac) 40 MG capsule Take 1 capsule (40 mg total) by mouth daily, Starting Mon 3/11/2019, Print      melatonin 3 mg Take 3-6mg by mouth daily at bedtime as needed for insomnia, No Print           No discharge procedures on file      ED Provider  Electronically Signed by           Lesvia Epstein DO  10/02/19 1525

## 2019-10-04 ENCOUNTER — VBI (OUTPATIENT)
Dept: FAMILY MEDICINE CLINIC | Facility: CLINIC | Age: 49
End: 2019-10-04

## 2019-10-04 NOTE — TELEPHONE ENCOUNTER
10/04/2019 01:20 PM Phone (Rinku Bruner) Hans Menezes (Self) [phone] (H)   Left Message  Unable to reach patient regarding recent ED visit on 10/02 for Hand laceration [Dorsal aspect right hand from glass]  Patient was discharged without medication and advised to follow up with PCP, as needed  2nd attempt will be made on 10/07/2019      10/07/2019 09:16 AM Phone (Rinku Bruner) Hansyoana Menezes ([employer]) [phone] (H)  Left Message  Unable to reach patient regarding recent ED visit on 10/02 for Hand laceration [Dorsal aspect right hand from glass]  Patient was discharged without medication and advised to follow up with PCP, as needed  Letter generated and mailed

## 2019-10-04 NOTE — LETTER
[facility]  [address]  [address]  [facility] [address]    Date: 10/07/19    Wilder Sánchez  [address]  [facility] [address]    Dear Catrachito Olivares:                                                                                                                Thank you for choosing [facility] emergency department for care  As your primary care provider we want to make sure that your ongoing medical care is being addressed  If you require follow up care as a result of your emergency department visit, there are a few things we would like you to know  As part of our continuing commitment to caring for our patient, we have added more same day appointments and have extended our office hours to meet your medical needs  After hours, on-call physicians are available via telephone  We encourage you to contact our office prior to seeking treatment to discuss your symptoms with our medical staff  Together, we can determine the correct course of action  A majority of non-emergent conditions such as: common cold, flu-like symptoms, fevers, strains/sprains, dislocations, minor burns, cuts and animal bites can be treated at [facility] facilities  Diagnostic testing is available at some sites  Of course, if you are experiencing a life threatening medical emergency call 911 or proceed directly to the nearest emergency room      Your nearest [facility] facility is conveniently located at:    Antonia DELANEY [address], [address]  [phone]    Sincerely,    [address] [facility]  Dept: [phone]

## 2019-10-11 DIAGNOSIS — F32.1 MODERATE SINGLE CURRENT EPISODE OF MAJOR DEPRESSIVE DISORDER (HCC): ICD-10-CM

## 2019-10-11 DIAGNOSIS — F41.1 GENERALIZED ANXIETY DISORDER: ICD-10-CM

## 2019-10-11 RX ORDER — FLUOXETINE HYDROCHLORIDE 40 MG/1
CAPSULE ORAL
Qty: 30 CAPSULE | Refills: 5 | Status: SHIPPED | OUTPATIENT
Start: 2019-10-11 | End: 2020-08-12 | Stop reason: SDUPTHER

## 2019-10-11 NOTE — TELEPHONE ENCOUNTER
Medication was sent, however should have follow-up visit on anxiety impression by the end of the year  Also do flu shot if desired

## 2019-10-15 ENCOUNTER — OFFICE VISIT (OUTPATIENT)
Dept: FAMILY MEDICINE CLINIC | Facility: CLINIC | Age: 49
End: 2019-10-15
Payer: COMMERCIAL

## 2019-10-15 VITALS
DIASTOLIC BLOOD PRESSURE: 76 MMHG | HEART RATE: 76 BPM | WEIGHT: 179 LBS | RESPIRATION RATE: 16 BRPM | BODY MASS INDEX: 25.62 KG/M2 | HEIGHT: 70 IN | SYSTOLIC BLOOD PRESSURE: 118 MMHG

## 2019-10-15 DIAGNOSIS — F41.1 GENERALIZED ANXIETY DISORDER: ICD-10-CM

## 2019-10-15 DIAGNOSIS — F32.1 MODERATE SINGLE CURRENT EPISODE OF MAJOR DEPRESSIVE DISORDER (HCC): Primary | ICD-10-CM

## 2019-10-15 PROCEDURE — 99213 OFFICE O/P EST LOW 20 MIN: CPT | Performed by: FAMILY MEDICINE

## 2019-10-15 PROCEDURE — 3008F BODY MASS INDEX DOCD: CPT | Performed by: FAMILY MEDICINE

## 2019-10-15 RX ORDER — LORAZEPAM 1 MG/1
1 TABLET ORAL EVERY 8 HOURS PRN
Qty: 60 TABLET | Refills: 2 | Status: SHIPPED | OUTPATIENT
Start: 2019-10-15 | End: 2019-12-27 | Stop reason: SDUPTHER

## 2019-10-15 NOTE — PROGRESS NOTES
[address]         NAME: Maria Ines Chao is a 47 y o  male  : 1970    MRN: [id number]  DATE: October 15, 2019  TIME: 7:38 AM    Assessment and Plan   Moderate single current episode of major depressive disorder ([facility] [address] ) [F32 1]  1  Moderate single current episode of major depressive disorder ([facility] [address] )     2  Generalized anxiety disorder         No problem-specific Assessment & Plan notes found for this encounter  Patient Instructions     There are no Patient Instructions on file for this visit  Chief Complaint     Chief Complaint   Patient presents with    Follow-up     3 MONTH MED CHECK         History of Present Illness       Follow-up on anxiety and depression  Anxiety has been worse recently  At times is unable leave the house  Did have previous experience with lorazepam while in the hospital which did not find overly sedating  Depressive signs are minimal with a PHQ score of 8  Review of Systems   Review of Systems   Constitutional: Negative for activity change, appetite change, diaphoresis and fatigue  Respiratory: Negative for cough, chest tightness, shortness of breath and wheezing  Cardiovascular: Negative for chest pain, palpitations and leg swelling  Fast or slow heart rate   Gastrointestinal: Negative for abdominal pain, blood in stool, constipation, diarrhea, nausea and vomiting  Genitourinary: Negative for difficulty urinating, dysuria and frequency  Neurological: Negative for dizziness, light-headedness, numbness and headaches  Psychiatric/Behavioral: Positive for sleep disturbance  Negative for agitation, confusion and dysphoric mood  The patient is nervous/anxious            Current Medications       Current Outpatient Medications:     ARIPiprazole (ABILIFY) 5 mg tablet, TAKE 1 TABLET BY MOUTH ONCE DAILY, Disp: 90 tablet, Rfl: 0    clonazePAM (KlonoPIN) 1 mg tablet, Use Previous sig, Disp: 60 tablet, Rfl: 0    FLUoxetine (PROzac) 40 MG capsule, TAKE 1 CAPSULE BY MOUTH ONCE DAILY, Disp: 30 capsule, Rfl: 5    melatonin 3 mg, Take 3-6mg by mouth daily at bedtime as needed for insomnia, Disp: 1 tablet, Rfl: 0    Current Allergies     Allergies as of 10/15/2019 - Reviewed 10/15/2019   Allergen Reaction Noted    Erythromycin Diarrhea 12/02/2016            The following portions of the patient's history were reviewed and updated as appropriate: allergies, current medications, past family history, past medical history, past social history, past surgical history and problem list      Past Medical History:   Diagnosis Date    Anxiety     Asthma     Depression     Diverticulitis     Hyperlipemia     RESOLVED [id number]    Medial meniscus tear     LAST ASSESSED [id number]    Psychiatric disorder     Psychiatric illness     Suicide [facility])        Past Surgical History:   Procedure Laterality Date    ARTHROSCOPY KNEE Right     ONSET 7/3/2014    RI LAP,SURG,COLECTOMY, PARTIAL, W/ANAST N/A 7/9/2019    Procedure: RESECTION COLON SIGMOID LAPAROSCOPIC WITH ANASTOMOSIS: INTRAOP COLONOSCOPY;  Surgeon: Aj Dasilva MD;  Location: [facility] OR;  Service: General    TONSILLECTOMY AND ADENOIDECTOMY      TOOTH EXTRACTION      WISDOM TEETH       Family History   Problem Relation Age of Onset    Diabetes Mother     Hypertension Mother     Prostate cancer Father     Substance Abuse Neg Hx     Mental illness Neg Hx          Medications have been verified  Objective   /76   Pulse 76   Resp 16   Ht 5' 10" (1 778 m)   Wt 81 2 kg (179 lb)   BMI 25 68 kg/m²        Physical Exam     Physical Exam   Constitutional: He is oriented to person, place, and time  He appears well-developed and well-nourished  No distress  HENT:   Head: Normocephalic and atraumatic  Eyes: Pupils are equal, round, and reactive to light  EOM are normal    Neck: Normal range of motion  Neck supple  No thyromegaly present     Cardiovascular: Normal rate, regular rhythm and normal heart sounds  No murmur heard  Pulmonary/Chest: Effort normal and breath sounds normal  No respiratory distress  Neurological: He is alert and oriented to person, place, and time  Psychiatric: He has a normal mood and affect   His behavior is normal

## 2019-10-15 NOTE — PATIENT INSTRUCTIONS
Continue meds for depression  Switch to lorazepam for anxiety control  DC clonazepam   Recheck in 3 months  Will plan to do full physical in 6 months with labs at that time

## 2019-11-27 DIAGNOSIS — F41.1 GENERALIZED ANXIETY DISORDER: ICD-10-CM

## 2019-11-27 DIAGNOSIS — F32.1 MODERATE SINGLE CURRENT EPISODE OF MAJOR DEPRESSIVE DISORDER (HCC): ICD-10-CM

## 2019-11-27 RX ORDER — ARIPIPRAZOLE 5 MG/1
TABLET ORAL
Qty: 90 TABLET | Refills: 0 | Status: SHIPPED | OUTPATIENT
Start: 2019-11-27 | End: 2020-08-12 | Stop reason: SDUPTHER

## 2019-12-27 DIAGNOSIS — F41.1 GENERALIZED ANXIETY DISORDER: ICD-10-CM

## 2019-12-27 RX ORDER — LORAZEPAM 1 MG/1
TABLET ORAL
Qty: 60 TABLET | Refills: 0 | Status: SHIPPED | OUTPATIENT
Start: 2019-12-27 | End: 2020-01-15 | Stop reason: SDUPTHER

## 2020-01-15 ENCOUNTER — OFFICE VISIT (OUTPATIENT)
Dept: FAMILY MEDICINE CLINIC | Facility: CLINIC | Age: 50
End: 2020-01-15
Payer: COMMERCIAL

## 2020-01-15 VITALS
SYSTOLIC BLOOD PRESSURE: 124 MMHG | DIASTOLIC BLOOD PRESSURE: 78 MMHG | RESPIRATION RATE: 16 BRPM | WEIGHT: 186 LBS | BODY MASS INDEX: 26.63 KG/M2 | HEIGHT: 70 IN | HEART RATE: 76 BPM

## 2020-01-15 DIAGNOSIS — Z12.5 ENCOUNTER FOR PROSTATE CANCER SCREENING: ICD-10-CM

## 2020-01-15 DIAGNOSIS — F41.1 GENERALIZED ANXIETY DISORDER: ICD-10-CM

## 2020-01-15 DIAGNOSIS — Z13.6 SCREENING FOR CARDIOVASCULAR CONDITION: ICD-10-CM

## 2020-01-15 DIAGNOSIS — F32.1 MODERATE SINGLE CURRENT EPISODE OF MAJOR DEPRESSIVE DISORDER (HCC): Primary | ICD-10-CM

## 2020-01-15 PROCEDURE — 1036F TOBACCO NON-USER: CPT | Performed by: FAMILY MEDICINE

## 2020-01-15 PROCEDURE — 99213 OFFICE O/P EST LOW 20 MIN: CPT | Performed by: FAMILY MEDICINE

## 2020-01-15 RX ORDER — LORAZEPAM 1 MG/1
1 TABLET ORAL EVERY 8 HOURS PRN
Qty: 60 TABLET | Refills: 1 | Status: SHIPPED | OUTPATIENT
Start: 2020-01-15 | End: 2020-03-13

## 2020-01-15 NOTE — PROGRESS NOTES
BMI Counseling: Body mass index is 26 69 kg/m²  The BMI is above normal  Nutrition recommendations include decreasing portion sizes, encouraging healthy choices of fruits and vegetables and moderation in carbohydrate intake  Exercise recommendations include moderate physical activity 150 minutes/week  No pharmacotherapy was ordered  Patient referred to PCP due to patient being overweight  [id number]        NAME: Ronda Thayer is a 47 y o  male  : 1970    MRN: [id number]  DATE: January 15, 2020  TIME: 8:08 AM    Assessment and Plan   Moderate single current episode of major depressive disorder ([facility] ) [F32 1]  1  Moderate single current episode of major depressive disorder ([facility] )     2  Generalized anxiety disorder  LORazepam (ATIVAN) 1 mg tablet   3  Encounter for prostate cancer screening  PSA Total (Reflex To Free)    PSA Total (Reflex To Free)   4  Screening for cardiovascular condition  Comprehensive metabolic panel    Lipid Panel with Direct LDL reflex    Comprehensive metabolic panel    Lipid Panel with Direct LDL reflex   5  BMI 26 0-26 9,adult         No problem-specific Assessment & Plan notes found for this encounter  Patient Instructions     Patient Instructions   Continue current medications  Schedule evaluation for certification for medical marijuana  I would get labs in early May and schedule physical in May to   Weight Management   AMBULATORY CARE:   Why it is important to manage your weight:  Being overweight increases your risk of health conditions such as heart disease, high blood pressure, type 2 diabetes, and certain types of cancer  It can also increase your risk for osteoarthritis, sleep apnea, and other respiratory problems  Aim for a slow, steady weight loss  Even a small amount of weight loss can lower your risk of health problems    How to lose weight safely:  A safe and healthy way to lose weight is to eat fewer calories and get regular exercise  You can lose up about 1 pound a week by decreasing the number of calories you eat by 500 calories each day  You can decrease calories by eating smaller portion sizes or by cutting out high-calorie foods  Read labels to find out how many calories are in the foods you eat  You can also burn calories with exercise such as walking, swimming, or biking  You will be more likely to keep weight off if you make these changes part of your lifestyle  Healthy meal plan for weight management:  A healthy meal plan includes a variety of foods, contains fewer calories, and helps you stay healthy  A healthy meal plan includes the following:  · Eat whole-grain foods more often  A healthy meal plan should contain fiber  Fiber is the part of grains, fruits, and vegetables that is not broken down by your body  Whole-grain foods are healthy and provide extra fiber in your diet  Some examples of whole-grain foods are whole-wheat breads and pastas, oatmeal, brown rice, and bulgur  · Eat a variety of vegetables every day  Include dark, leafy greens such as spinach, kale, sherrill greens, and mustard greens  Eat yellow and orange vegetables such as carrots, sweet potatoes, and winter squash  · Eat a variety of fruits every day  Choose fresh or canned fruit (canned in its own juice or light syrup) instead of juice  Fruit juice has very little or no fiber  · Eat low-fat dairy foods  Drink fat-free (skim) milk or 1% milk  Eat fat-free yogurt and low-fat cottage cheese  Try low-fat cheeses such as mozzarella and other reduced-fat cheeses  · Choose meat and other protein foods that are low in fat  Choose beans or other legumes such as split peas or lentils  Choose fish, skinless poultry (chicken or turkey), or lean cuts of red meat (beef or pork)  Before you cook meat or poultry, cut off any visible fat  · Use less fat and oil  Try baking foods instead of frying them   Add less fat, such as margarine, sour cream, regular salad dressing and mayonnaise to foods  Eat fewer high-fat foods  Some examples of high-fat foods include french fries, doughnuts, ice cream, and cakes  · Eat fewer sweets  Limit foods and drinks that are high in sugar  This includes candy, cookies, regular soda, and sweetened drinks  Ways to decrease calories:   · Eat smaller portions  ¨ Use a small plate with smaller servings  ¨ Do not eat second helpings  ¨ When you eat at a restaurant, ask for a box and place half of your meal in the box before you eat  ¨ Share an entrée with someone else  · Replace high-calorie snacks with healthy, low-calorie snacks  ¨ Choose fresh fruit, vegetables, fat-free rice cakes, or air-popped popcorn instead of potato chips, nuts, or chocolate  ¨ Choose water or calorie-free drinks instead of soda or sweetened drinks  · Eat regular meals  Skipping meals can lead to overeating later in the day  Eat a healthy snack in place of a meal if you do not have time to eat a regular meal      · Do not shop for groceries when you are hungry  You may be more likely to make unhealthy food choices  Take a grocery list of healthy foods and shop after you have eaten  Exercise:  Exercise at least 30 minutes per day on most days of the week  Some examples of exercise include walking, biking, dancing, and swimming  You can also fit in more physical activity by taking the stairs instead of the elevator or parking farther away from stores  Ask your healthcare provider about the best exercise plan for you  Other things to consider as you try to lose weight:   · Be aware of situations that may give you the urge to overeat, such as eating while watching television  Find ways to avoid these situations  For example, read a book, go for a walk, or do crafts  · Meet with a weight loss support group or friends who are also trying to lose weight   This may help you stay motivated to continue working on your weight loss goals  © 2017 [address] [facility] Information is for End User's use only and may not be sold, redistributed or otherwise used for commercial purposes  All illustrations and images included in CareNotes® are the copyrighted property of [employer] M [employer]  or [employer]  The above information is an  only  It is not intended as medical advice for individual conditions or treatments  Talk to your doctor, nurse or pharmacist before following any medical regimen to see if it is safe and effective for you  Heart Healthy Diet   AMBULATORY CARE:   A heart healthy diet  is an eating plan low in total fat, unhealthy fats, and sodium (salt)  A heart healthy diet helps decrease your risk for heart disease and stroke  Limit the amount of fat you eat to 25% to 35% of your total daily calories  Limit sodium to less than 2,300 mg each day  Healthy fats:  Healthy fats can help improve cholesterol levels  The risk for heart disease is decreased when cholesterol levels are normal  Choose healthy fats, such as the following:  · Unsaturated fat  is found in foods such as soybean, canola, olive, corn, and safflower oils  It is also found in soft tub margarine that is made with liquid vegetable oil  · Omega-3 fat  is found in certain fish, such as salmon, tuna, and trout, and in walnuts and flaxseed  Unhealthy fats:  Unhealthy fats can cause unhealthy cholesterol levels in your blood and increase your risk of heart disease  Limit unhealthy fats, such as the following:  · Cholesterol  is found in animal foods, such as eggs and lobster, and in dairy products made from whole milk  Limit cholesterol to less than 300 milligrams (mg) each day  You may need to limit cholesterol to 200 mg each day if you have heart disease  · Saturated fat  is found in meats, such as warren and hamburger  It is also found in chicken or turkey skin, whole milk, and butter   Limit saturated fat to less than 7% of your total daily calories  Limit saturated fat to less than 6% if you have heart disease or are at increased risk for it  · Trans fat  is found in packaged foods, such as potato chips and cookies  It is also in hard margarine, some fried foods, and shortening  Avoid trans fats as much as possible    Heart healthy foods and drinks to include:  Ask your dietitian or healthcare provider how many servings to have from each of the following food groups:  · Grains:      ¨ Whole-wheat breads, cereals, and pastas, and brown rice    ¨ Low-fat, low-sodium crackers and chips    · Vegetables:      ¨ Broccoli, green beans, green peas, and spinach    ¨ Collards, kale, and lima beans    ¨ Carrots, sweet potatoes, tomatoes, and peppers    ¨ Canned vegetables with no salt added    · Fruits:      ¨ Bananas, peaches, pears, and pineapple    ¨ Grapes, raisins, and dates    ¨ Oranges, tangerines, grapefruit, orange juice, and grapefruit juice    ¨ Apricots, mangoes, melons, and papaya    ¨ Raspberries and strawberries    ¨ Canned fruit with no added sugar    · Low-fat dairy products:      ¨ Nonfat (skim) milk, 1% milk, and low-fat almond, cashew, or soy milks fortified with calcium    ¨ Low-fat cheese, regular or frozen yogurt, and cottage cheese    · Meats and proteins , such as lean cuts of beef and pork (loin, leg, round), skinless chicken and turkey, legumes, soy products, egg whites, and nuts  Foods and drinks to limit or avoid:  Ask your dietitian or healthcare provider about these and other foods that are high in unhealthy fat, sodium, and sugar:  · Snack or packaged foods , such as frozen dinners, cookies, macaroni and cheese, and cereals with more than 300 mg of sodium per serving    · Canned or dry mixes  for cakes, soups, sauces, or gravies    · Vegetables with added sodium , such as instant potatoes, vegetables with added sauces, or regular canned vegetables    · Other foods high in sodium , such as ketchup, barbecue sauce, salad dressing, pickles, olives, soy sauce, and miso    · High-fat dairy foods  such as whole or 2% milk, cream cheese, or sour cream, and cheeses     · High-fat protein foods  such as high-fat cuts of beef (T-bone steaks, ribs), chicken or turkey with skin, and organ meats, such as liver    · Cured or smoked meats , such as hot dogs, warren, and sausage    · Unhealthy fats and oils , such as butter, stick margarine, shortening, and cooking oils such as coconut or palm oil    · Food and drinks high in sugar , such as soft drinks (soda), sports drinks, sweetened tea, candy, cake, cookies, pies, and doughnuts  Other diet guidelines to follow:   · Eat more foods containing omega-3 fats  Eat fish high in omega-3 fats at least 2 times a week  · Limit alcohol  Too much alcohol can damage your heart and raise your blood pressure  Women should limit alcohol to 1 drink a day  Men should limit alcohol to 2 drinks a day  A drink of alcohol is 12 ounces of beer, 5 ounces of wine, or 1½ ounces of liquor  · Choose low-sodium foods  High-sodium foods can lead to high blood pressure  Add little or no salt to food you prepare  Use herbs and spices in place of salt  · Eat more fiber  to help lower cholesterol levels  Eat at least 5 servings of fruits and vegetables each day  Eat 3 ounces of whole-grain foods each day  Legumes (beans) are also a good source of fiber  Lifestyle guidelines:   · Do not smoke  Nicotine and other chemicals in cigarettes and cigars can cause lung and heart damage  Ask your healthcare provider for information if you currently smoke and need help to quit  E-cigarettes or smokeless tobacco still contain nicotine  Talk to your healthcare provider before you use these products  · Exercise regularly  to help you maintain a healthy weight and improve your blood pressure and cholesterol levels  Ask your healthcare provider about the best exercise plan for you   Do not start an exercise program without asking your healthcare provider  Follow up with your healthcare provider as directed:  Write down your questions so you remember to ask them during your visits  © 2017 [address] [employer] Information is for End User's use only and may not be sold, redistributed or otherwise used for commercial purposes  All illustrations and images included in CareNotes® are the copyrighted property of [employer] Inc  or [employer]  The above information is an  only  It is not intended as medical advice for individual conditions or treatments  Talk to your doctor, nurse or pharmacist before following any medical regimen to see if it is safe and effective for you  Chief Complaint     Chief Complaint   Patient presents with    Follow-up     MED CHECK         History of Present Illness       Patient here for follow-up on anxiety and depression  Depressive symptoms are under good control  Still having significant anxiety  Finds using the lorazepam 2-3 times per day  Would be interested in considering medical marijuana for the anxiety  Review of Systems   Review of Systems   Constitutional: Negative for appetite change, chills, diaphoresis and fever  HENT: Negative for ear pain, rhinorrhea, sinus pressure and sore throat  Eyes: Negative for discharge, redness and itching  Respiratory: Negative for cough, shortness of breath and wheezing  Cardiovascular: Negative for chest pain and palpitations  Rapid or slow heart rate   Gastrointestinal: Negative for abdominal pain, diarrhea, nausea and vomiting  Psychiatric/Behavioral: Negative for decreased concentration, dysphoric mood, sleep disturbance and suicidal ideas  The patient is nervous/anxious            Current Medications       Current Outpatient Medications:     ARIPiprazole (ABILIFY) 5 mg tablet, TAKE 1 TABLET BY MOUTH ONCE DAILY, Disp: 90 tablet, Rfl: 0    FLUoxetine (PROzac) 40 MG capsule, TAKE 1 CAPSULE BY MOUTH ONCE DAILY, Disp: 30 capsule, Rfl: 5    LORazepam (ATIVAN) 1 mg tablet, Take 1 tablet (1 mg total) by mouth every 8 (eight) hours as needed for anxiety, Disp: 60 tablet, Rfl: 1    melatonin 3 mg, Take 3-6mg by mouth daily at bedtime as needed for insomnia, Disp: 1 tablet, Rfl: 0    Current Allergies     Allergies as of 01/15/2020 - Reviewed 01/15/2020   Allergen Reaction Noted    Erythromycin Diarrhea 12/02/2016            The following portions of the patient's history were reviewed and updated as appropriate: allergies, current medications, past family history, past medical history, past social history, past surgical history and problem list      Past Medical History:   Diagnosis Date    Anxiety     Asthma     Depression     Diverticulitis     Hyperlipemia     RESOLVED [id number]    Medial meniscus tear     LAST ASSESSED [id number]    Psychiatric disorder     Psychiatric illness     [facility])        Past Surgical History:   Procedure Laterality Date    ARTHROSCOPY KNEE Right     ONSET 7/3/2014    DE LAP,SURG,COLECTOMY, PARTIAL, W/ANAST N/A 7/9/2019    Procedure: RESECTION COLON SIGMOID LAPAROSCOPIC WITH ANASTOMOSIS: INTRAOP COLONOSCOPY;  Surgeon: Nick Muñoz MD;  Location:  MAIN OR;  Service: General    TONSILLECTOMY AND ADENOIDECTOMY      TOOTH EXTRACTION      WISDOM TEETH       Family History   Problem Relation Age of Onset    Diabetes Mother     Hypertension Mother     Prostate cancer Father     Substance Abuse Neg Hx     Mental illness Neg Hx          Medications have been verified  Objective   /78   Pulse 76   Resp 16   Ht 5' 10" (1 778 m)   Wt 84 4 kg (186 lb)   BMI 26 69 kg/m²        Physical Exam     Physical Exam   Constitutional: He is oriented to person, place, and time  He appears well-developed and well-nourished  No distress  HENT:   Head: Normocephalic and atraumatic     Right Ear: Tympanic membrane and external ear normal  No drainage  Left Ear: Tympanic membrane normal  No drainage  Mouth/Throat: No oropharyngeal exudate  Eyes: Conjunctivae and EOM are normal  Right eye exhibits no discharge  Left eye exhibits no discharge  Neck: Normal range of motion  Neck supple  No thyromegaly present  Cardiovascular: Normal rate, regular rhythm and normal heart sounds  Pulmonary/Chest: Effort normal  No respiratory distress  He has no wheezes  He has no rales  Lymphadenopathy:     He has no cervical adenopathy  Neurological: He is alert and oriented to person, place, and time  Psychiatric: He has a normal mood and affect  His behavior is normal                  BMI Counseling: Body mass index is 26 69 kg/m²  The BMI is above normal  Nutrition recommendations include reducing portion sizes, decreasing overall calorie intake and 3-5 servings of fruits/vegetables daily

## 2020-01-15 NOTE — PATIENT INSTRUCTIONS
Continue current medications  Schedule evaluation for certification for medical marijuana  I would get labs in early May and schedule physical in May to June  Weight Management   AMBULATORY CARE:   Why it is important to manage your weight:  Being overweight increases your risk of health conditions such as heart disease, high blood pressure, type 2 diabetes, and certain types of cancer  It can also increase your risk for osteoarthritis, sleep apnea, and other respiratory problems  Aim for a slow, steady weight loss  Even a small amount of weight loss can lower your risk of health problems  How to lose weight safely:  A safe and healthy way to lose weight is to eat fewer calories and get regular exercise  You can lose up about 1 pound a week by decreasing the number of calories you eat by 500 calories each day  You can decrease calories by eating smaller portion sizes or by cutting out high-calorie foods  Read labels to find out how many calories are in the foods you eat  You can also burn calories with exercise such as walking, swimming, or biking  You will be more likely to keep weight off if you make these changes part of your lifestyle  Healthy meal plan for weight management:  A healthy meal plan includes a variety of foods, contains fewer calories, and helps you stay healthy  A healthy meal plan includes the following:  · Eat whole-grain foods more often  A healthy meal plan should contain fiber  Fiber is the part of grains, fruits, and vegetables that is not broken down by your body  Whole-grain foods are healthy and provide extra fiber in your diet  Some examples of whole-grain foods are whole-wheat breads and pastas, oatmeal, brown rice, and bulgur  · Eat a variety of vegetables every day  Include dark, leafy greens such as spinach, kale, sherrill greens, and mustard greens  Eat yellow and orange vegetables such as carrots, sweet potatoes, and winter squash  · Eat a variety of fruits every day  Choose fresh or canned fruit (canned in its own juice or light syrup) instead of juice  Fruit juice has very little or no fiber  · Eat low-fat dairy foods  Drink fat-free (skim) milk or 1% milk  Eat fat-free yogurt and low-fat cottage cheese  Try low-fat cheeses such as mozzarella and other reduced-fat cheeses  · Choose meat and other protein foods that are low in fat  Choose beans or other legumes such as split peas or lentils  Choose fish, skinless poultry (chicken or turkey), or lean cuts of red meat (beef or pork)  Before you cook meat or poultry, cut off any visible fat  · Use less fat and oil  Try baking foods instead of frying them  Add less fat, such as margarine, sour cream, regular salad dressing and mayonnaise to foods  Eat fewer high-fat foods  Some examples of high-fat foods include french fries, doughnuts, ice cream, and cakes  · Eat fewer sweets  Limit foods and drinks that are high in sugar  This includes candy, cookies, regular soda, and sweetened drinks  Ways to decrease calories:   · Eat smaller portions  ¨ Use a small plate with smaller servings  ¨ Do not eat second helpings  ¨ When you eat at a restaurant, ask for a box and place half of your meal in the box before you eat  ¨ Share an entrée with someone else  · Replace high-calorie snacks with healthy, low-calorie snacks  ¨ Choose fresh fruit, vegetables, fat-free rice cakes, or air-popped popcorn instead of potato chips, nuts, or chocolate  ¨ Choose water or calorie-free drinks instead of soda or sweetened drinks  · Eat regular meals  Skipping meals can lead to overeating later in the day  Eat a healthy snack in place of a meal if you do not have time to eat a regular meal      · Do not shop for groceries when you are hungry  You may be more likely to make unhealthy food choices  Take a grocery list of healthy foods and shop after you have eaten    Exercise:  Exercise at least 30 minutes per day on most days of the week  Some examples of exercise include walking, biking, dancing, and swimming  You can also fit in more physical activity by taking the stairs instead of the elevator or parking farther away from stores  Ask your healthcare provider about the best exercise plan for you  Other things to consider as you try to lose weight:   · Be aware of situations that may give you the urge to overeat, such as eating while watching television  Find ways to avoid these situations  For example, read a book, go for a walk, or do crafts  · Meet with a weight loss support group or friends who are also trying to lose weight  This may help you stay motivated to continue working on your weight loss goals  © 2017 [employer]  Information is for End User's use only and may not be sold, redistributed or otherwise used for commercial purposes  All illustrations and images included in CareNotes® are the copyrighted property of [employer]  or [employer]  The above information is an  only  It is not intended as medical advice for individual conditions or treatments  Talk to your doctor, nurse or pharmacist before following any medical regimen to see if it is safe and effective for you  Heart Healthy Diet   AMBULATORY CARE:   A heart healthy diet  is an eating plan low in total fat, unhealthy fats, and sodium (salt)  A heart healthy diet helps decrease your risk for heart disease and stroke  Limit the amount of fat you eat to 25% to 35% of your total daily calories  Limit sodium to less than 2,300 mg each day  Healthy fats:  Healthy fats can help improve cholesterol levels  The risk for heart disease is decreased when cholesterol levels are normal  Choose healthy fats, such as the following:  · Unsaturated fat  is found in foods such as soybean, canola, olive, corn, and safflower oils  It is also found in soft tub margarine that is made with liquid vegetable oil  · Omega-3 fat  is found in certain fish, such as salmon, tuna, and trout, and in walnuts and flaxseed  Unhealthy fats:  Unhealthy fats can cause unhealthy cholesterol levels in your blood and increase your risk of heart disease  Limit unhealthy fats, such as the following:  · Cholesterol  is found in animal foods, such as eggs and lobster, and in dairy products made from whole milk  Limit cholesterol to less than 300 milligrams (mg) each day  You may need to limit cholesterol to 200 mg each day if you have heart disease  · Saturated fat  is found in meats, such as warren and hamburger  It is also found in chicken or turkey skin, whole milk, and butter  Limit saturated fat to less than 7% of your total daily calories  Limit saturated fat to less than 6% if you have heart disease or are at increased risk for it  · Trans fat  is found in packaged foods, such as potato chips and cookies  It is also in hard margarine, some fried foods, and shortening  Avoid trans fats as much as possible    Heart healthy foods and drinks to include:  Ask your dietitian or healthcare provider how many servings to have from each of the following food groups:  · Grains:      ¨ Whole-wheat breads, cereals, and pastas, and brown rice    ¨ Low-fat, low-sodium crackers and chips    · Vegetables:      ¨ Broccoli, green beans, green peas, and spinach    ¨ Collards, kale, and lima beans    ¨ Carrots, sweet potatoes, tomatoes, and peppers    ¨ Canned vegetables with no salt added    · Fruits:      ¨ Bananas, peaches, pears, and pineapple    ¨ Grapes, raisins, and dates    ¨ Oranges, tangerines, grapefruit, orange juice, and grapefruit juice    ¨ Apricots, mangoes, melons, and papaya    ¨ Raspberries and strawberries    ¨ Canned fruit with no added sugar    · Low-fat dairy products:      ¨ Nonfat (skim) milk, 1% milk, and low-fat almond, cashew, or soy milks fortified with calcium    ¨ Low-fat cheese, regular or frozen yogurt, and cottage cheese    · Meats and proteins , such as lean cuts of beef and pork (loin, leg, round), skinless chicken and turkey, legumes, soy products, egg whites, and nuts  Foods and drinks to limit or avoid:  Ask your dietitian or healthcare provider about these and other foods that are high in unhealthy fat, sodium, and sugar:  · Snack or packaged foods , such as frozen dinners, cookies, macaroni and cheese, and cereals with more than 300 mg of sodium per serving    · Canned or dry mixes  for cakes, soups, sauces, or gravies    · Vegetables with added sodium , such as instant potatoes, vegetables with added sauces, or regular canned vegetables    · Other foods high in sodium , such as ketchup, barbecue sauce, salad dressing, pickles, olives, soy sauce, and miso    · High-fat dairy foods  such as whole or 2% milk, cream cheese, or sour cream, and cheeses     · High-fat protein foods  such as high-fat cuts of beef (T-bone steaks, ribs), chicken or turkey with skin, and organ meats, such as liver    · Cured or smoked meats , such as hot dogs, warren, and sausage    · Unhealthy fats and oils , such as butter, stick margarine, shortening, and cooking oils such as coconut or palm oil    · Food and drinks high in sugar , such as soft drinks (soda), sports drinks, sweetened tea, candy, cake, cookies, pies, and doughnuts  Other diet guidelines to follow:   · Eat more foods containing omega-3 fats  Eat fish high in omega-3 fats at least 2 times a week  · Limit alcohol  Too much alcohol can damage your heart and raise your blood pressure  Women should limit alcohol to 1 drink a day  Men should limit alcohol to 2 drinks a day  A drink of alcohol is 12 ounces of beer, 5 ounces of wine, or 1½ ounces of liquor  · Choose low-sodium foods  High-sodium foods can lead to high blood pressure  Add little or no salt to food you prepare  Use herbs and spices in place of salt  · Eat more fiber  to help lower cholesterol levels   Eat at least 5 servings of fruits and vegetables each day  Eat 3 ounces of whole-grain foods each day  Legumes (beans) are also a good source of fiber  Lifestyle guidelines:   · Do not smoke  Nicotine and other chemicals in cigarettes and cigars can cause lung and heart damage  Ask your healthcare provider for information if you currently smoke and need help to quit  E-cigarettes or smokeless tobacco still contain nicotine  Talk to your healthcare provider before you use these products  · Exercise regularly  to help you maintain a healthy weight and improve your blood pressure and cholesterol levels  Ask your healthcare provider about the best exercise plan for you  Do not start an exercise program without asking your healthcare provider  Follow up with your healthcare provider as directed:  Write down your questions so you remember to ask them during your visits  © 2017 [employer] Information is for End User's use only and may not be sold, redistributed or otherwise used for commercial purposes  All illustrations and images included in CareNotes® are the copyrighted property of [employer] Inc  or [employer]  The above information is an  only  It is not intended as medical advice for individual conditions or treatments  Talk to your doctor, nurse or pharmacist before following any medical regimen to see if it is safe and effective for you

## 2020-01-23 ENCOUNTER — OFFICE VISIT (OUTPATIENT)
Dept: FAMILY MEDICINE CLINIC | Facility: CLINIC | Age: 50
End: 2020-01-23
Payer: COMMERCIAL

## 2020-01-23 VITALS
SYSTOLIC BLOOD PRESSURE: 124 MMHG | BODY MASS INDEX: 26.63 KG/M2 | WEIGHT: 186 LBS | DIASTOLIC BLOOD PRESSURE: 76 MMHG | HEART RATE: 76 BPM | HEIGHT: 70 IN | RESPIRATION RATE: 16 BRPM

## 2020-01-23 DIAGNOSIS — F41.9 ANXIETY: Primary | ICD-10-CM

## 2020-01-23 PROCEDURE — 99214 OFFICE O/P EST MOD 30 MIN: CPT | Performed by: FAMILY MEDICINE

## 2020-01-23 PROCEDURE — 3008F BODY MASS INDEX DOCD: CPT | Performed by: FAMILY MEDICINE

## 2020-01-23 NOTE — PROGRESS NOTES
[address]  [facility]        NAME: Rupali Ingram is a 47 y o  male  : 1970    MRN: [id number]  DATE: 2020  TIME: 7:29 AM    Assessment and Plan   Anxiety [F41 9]  1  Anxiety           Patient Instructions     Patient Instructions   Records reviewed--referred by Dr Kaity Keita candidate for med [facility]          Chief Complaint     Chief Complaint   Patient presents with    Follow-up         History of Present Illness       C/o anxiety---wants to discuss alternatives to current meds      Review of Systems   Review of Systems   Constitutional: Negative for fatigue, fever and unexpected weight change  HENT: Negative for congestion, sinus pain and sore throat  Eyes: Negative for visual disturbance  Respiratory: Negative for shortness of breath and wheezing  Cardiovascular: Negative for chest pain and palpitations  Gastrointestinal: Negative for abdominal pain, nausea and vomiting  Musculoskeletal: Negative  Negative for arthralgias and myalgias  Neurological: Negative for syncope, weakness and numbness  Psychiatric/Behavioral: Negative for confusion, dysphoric mood and suicidal ideas  The patient is nervous/anxious            Current Medications       Current Outpatient Medications:     ARIPiprazole (ABILIFY) 5 mg tablet, TAKE 1 TABLET BY MOUTH ONCE DAILY, Disp: 90 tablet, Rfl: 0    FLUoxetine (PROzac) 40 MG capsule, TAKE 1 CAPSULE BY MOUTH ONCE DAILY, Disp: 30 capsule, Rfl: 5    LORazepam (ATIVAN) 1 mg tablet, Take 1 tablet (1 mg total) by mouth every 8 (eight) hours as needed for anxiety, Disp: 60 tablet, Rfl: 1    melatonin 3 mg, Take 3-6mg by mouth daily at bedtime as needed for insomnia, Disp: 1 tablet, Rfl: 0    Current Allergies     Allergies as of 2020 - Reviewed 2020   Allergen Reaction Noted    Erythromycin Diarrhea 2016            The following portions of the patient's history were reviewed and updated as appropriate: allergies, current medications, past family history, past medical history, past social history, past surgical history and problem list      Past Medical History:   Diagnosis Date    Anxiety     Asthma     Depression     Diverticulitis     Hyperlipemia     RESOLVED [id number]    Medial meniscus tear     LAST ASSESSED [id number]    Psychiatric disorder     Psychiatric illness     Suicide attempt [facility])        Past Surgical History:   Procedure Laterality Date    ARTHROSCOPY KNEE Right     ONSET 7/3/2014    IN LAP,SURG,COLECTOMY, PARTIAL, W/ANAST N/A 7/9/2019    Procedure: RESECTION COLON SIGMOID LAPAROSCOPIC WITH ANASTOMOSIS: INTRAOP COLONOSCOPY;  Surgeon: Ashwin Live MD;  Location:  MAIN OR;  Service: General    TONSILLECTOMY AND ADENOIDECTOMY      TOOTH EXTRACTION      WISDOM TEETH       Family History   Problem Relation Age of Onset    Diabetes Mother     Hypertension Mother     Prostate cancer Father     Substance Abuse Neg Hx     Mental illness Neg Hx          Medications have been verified  Objective   /76   Pulse 76   Resp 16   Ht 5' 10" (1 778 m)   Wt 84 4 kg (186 lb)   BMI 26 69 kg/m²        Physical Exam     Physical Exam   Constitutional: He is oriented to person, place, and time  Vital signs are normal  He appears well-developed and well-nourished  HENT:   Right Ear: Ear canal normal  Tympanic membrane is not injected  Left Ear: Ear canal normal  Tympanic membrane is not injected  Nose: Nose normal    Mouth/Throat: Oropharynx is clear and moist    Eyes: Pupils are equal, round, and reactive to light  Conjunctivae and EOM are normal  Right eye exhibits no discharge  Left eye exhibits no discharge  Neck: Normal range of motion  Neck supple  No thyromegaly present  Cardiovascular: Normal rate, regular rhythm and normal heart sounds  No murmur heard  Pulmonary/Chest: Effort normal and breath sounds normal  No respiratory distress  He has no wheezes  Abdominal: Soft   Bowel sounds are normal  He exhibits no distension  There is no tenderness  Musculoskeletal: Normal range of motion  Lymphadenopathy:     He has no cervical adenopathy  Neurological: He is alert and oriented to person, place, and time  He has normal strength and normal reflexes  He is not disoriented  No sensory deficit  Gait normal    Skin: Skin is warm and dry  Psychiatric: He has a normal mood and affect  His speech is normal and behavior is normal  Judgment and thought content normal  Cognition and memory are normal        BMI Counseling: Body mass index is 26 69 kg/m²  The BMI is above normal  Nutrition recommendations include reducing portion sizes

## 2020-03-12 DIAGNOSIS — F41.1 GENERALIZED ANXIETY DISORDER: ICD-10-CM

## 2020-03-13 RX ORDER — LORAZEPAM 1 MG/1
TABLET ORAL
Qty: 60 TABLET | Refills: 2 | Status: SHIPPED | OUTPATIENT
Start: 2020-03-13 | End: 2020-06-01

## 2020-05-31 DIAGNOSIS — F41.1 GENERALIZED ANXIETY DISORDER: ICD-10-CM

## 2020-06-01 RX ORDER — LORAZEPAM 1 MG/1
TABLET ORAL
Qty: 60 TABLET | Refills: 0 | Status: SHIPPED | OUTPATIENT
Start: 2020-06-01 | End: 2020-06-17

## 2020-06-17 DIAGNOSIS — F41.1 GENERALIZED ANXIETY DISORDER: ICD-10-CM

## 2020-06-18 RX ORDER — LORAZEPAM 1 MG/1
TABLET ORAL
Qty: 60 TABLET | Refills: 0 | Status: SHIPPED | OUTPATIENT
Start: 2020-06-20 | End: 2020-08-12 | Stop reason: SDUPTHER

## 2020-08-12 DIAGNOSIS — F41.1 GENERALIZED ANXIETY DISORDER: ICD-10-CM

## 2020-08-12 DIAGNOSIS — F32.1 MODERATE SINGLE CURRENT EPISODE OF MAJOR DEPRESSIVE DISORDER (HCC): ICD-10-CM

## 2020-08-12 RX ORDER — LORAZEPAM 1 MG/1
1 TABLET ORAL EVERY 8 HOURS PRN
Qty: 60 TABLET | Refills: 0 | Status: SHIPPED | OUTPATIENT
Start: 2020-08-12 | End: 2020-09-14

## 2020-08-12 RX ORDER — ARIPIPRAZOLE 5 MG/1
5 TABLET ORAL DAILY
Qty: 30 TABLET | Refills: 0 | Status: SHIPPED | OUTPATIENT
Start: 2020-08-12 | End: 2020-08-28 | Stop reason: SDUPTHER

## 2020-08-12 RX ORDER — FLUOXETINE HYDROCHLORIDE 40 MG/1
40 CAPSULE ORAL DAILY
Qty: 30 CAPSULE | Refills: 0 | Status: SHIPPED | OUTPATIENT
Start: 2020-08-12 | End: 2020-08-28 | Stop reason: SDUPTHER

## 2020-08-12 NOTE — TELEPHONE ENCOUNTER
Patient called requesting a refill for Ativan, Aripiprazole and fluoxetine  Appointment schedule 08/28/20, blood work done today

## 2020-08-13 ENCOUNTER — TELEPHONE (OUTPATIENT)
Dept: FAMILY MEDICINE CLINIC | Facility: CLINIC | Age: 50
End: 2020-08-13

## 2020-08-13 LAB
ALBUMIN SERPL-MCNC: 4.4 G/DL (ref 3.8–4.9)
ALBUMIN/GLOB SERPL: 1.9 {RATIO} (ref 1.2–2.2)
ALP SERPL-CCNC: 58 IU/L (ref 39–117)
ALT SERPL-CCNC: 26 IU/L (ref 0–44)
AST SERPL-CCNC: 22 IU/L (ref 0–40)
BILIRUB SERPL-MCNC: 0.5 MG/DL (ref 0–1.2)
BUN SERPL-MCNC: 14 MG/DL (ref 6–24)
BUN/CREAT SERPL: 16 (ref 9–20)
CALCIUM SERPL-MCNC: 9.7 MG/DL (ref 8.7–10.2)
CHLORIDE SERPL-SCNC: 102 MMOL/L (ref 96–106)
CHOLEST SERPL-MCNC: 209 MG/DL (ref 100–199)
CO2 SERPL-SCNC: 23 MMOL/L (ref 20–29)
CREAT SERPL-MCNC: 0.87 MG/DL (ref 0.76–1.27)
GLOBULIN SER-MCNC: 2.3 G/DL (ref 1.5–4.5)
GLUCOSE SERPL-MCNC: 188 MG/DL (ref 65–99)
HDLC SERPL-MCNC: 47 MG/DL
LDLC SERPL CALC-MCNC: 130 MG/DL (ref 0–99)
LDLC/HDLC SERPL: 2.8 RATIO (ref 0–3.6)
POTASSIUM SERPL-SCNC: 4.3 MMOL/L (ref 3.5–5.2)
PROT SERPL-MCNC: 6.7 G/DL (ref 6–8.5)
PSA SERPL-MCNC: 1.1 NG/ML (ref 0–4)
SL AMB EGFR AFRICAN AMERICAN: 116 ML/MIN/1.73
SL AMB EGFR NON AFRICAN AMERICAN: 100 ML/MIN/1.73
SL AMB REFLEX CRITERIA: NORMAL
SL AMB VLDL CHOLESTEROL CALC: 32 MG/DL (ref 5–40)
SODIUM SERPL-SCNC: 139 MMOL/L (ref 134–144)
TRIGL SERPL-MCNC: 159 MG/DL (ref 0–149)

## 2020-08-13 NOTE — TELEPHONE ENCOUNTER
----- Message from Erick Gonzales MD sent at 8/13/2020 12:27 PM EDT -----  Call patient with lab result-verify this was a fasting sugar as sugar is elevated  Will review at office visit  Will plan to do A1c here in the office

## 2020-08-13 NOTE — RESULT ENCOUNTER NOTE
Call patient with lab result-verify this was a fasting sugar as sugar is elevated  Will review at office visit  Will plan to do A1c here in the office

## 2020-08-28 ENCOUNTER — OFFICE VISIT (OUTPATIENT)
Dept: FAMILY MEDICINE CLINIC | Facility: CLINIC | Age: 50
End: 2020-08-28

## 2020-08-28 VITALS
TEMPERATURE: 98.2 F | SYSTOLIC BLOOD PRESSURE: 124 MMHG | HEART RATE: 87 BPM | DIASTOLIC BLOOD PRESSURE: 78 MMHG | HEIGHT: 70 IN | WEIGHT: 197 LBS | BODY MASS INDEX: 28.2 KG/M2 | OXYGEN SATURATION: 96 %

## 2020-08-28 DIAGNOSIS — F32.1 MODERATE SINGLE CURRENT EPISODE OF MAJOR DEPRESSIVE DISORDER (HCC): Primary | ICD-10-CM

## 2020-08-28 DIAGNOSIS — R73.01 IMPAIRED FASTING BLOOD SUGAR: ICD-10-CM

## 2020-08-28 DIAGNOSIS — J45.20 MILD INTERMITTENT ASTHMA WITHOUT COMPLICATION: ICD-10-CM

## 2020-08-28 DIAGNOSIS — F41.1 GENERALIZED ANXIETY DISORDER: ICD-10-CM

## 2020-08-28 PROCEDURE — 99213 OFFICE O/P EST LOW 20 MIN: CPT | Performed by: FAMILY MEDICINE

## 2020-08-28 PROCEDURE — 3008F BODY MASS INDEX DOCD: CPT | Performed by: FAMILY MEDICINE

## 2020-08-28 RX ORDER — ARIPIPRAZOLE 5 MG/1
5 TABLET ORAL DAILY
Qty: 30 TABLET | Refills: 5 | Status: SHIPPED | OUTPATIENT
Start: 2020-08-28 | End: 2021-10-04 | Stop reason: ALTCHOICE

## 2020-08-28 RX ORDER — FLUOXETINE HYDROCHLORIDE 40 MG/1
40 CAPSULE ORAL DAILY
Qty: 30 CAPSULE | Refills: 5 | Status: SHIPPED | OUTPATIENT
Start: 2020-08-28 | End: 2021-10-04 | Stop reason: ALTCHOICE

## 2020-08-28 NOTE — PATIENT INSTRUCTIONS
Continue current medications for depression anxiety-fasting sugar was significantly elevated, we need to repeat this in about 2-3 months to verify whether you switch to the diabetic range or this was bad dietary habits  If able, check fasting blood sugar in 2-3 weeks  I would like to see this below 120-130

## 2020-08-28 NOTE — PROGRESS NOTES
[address]         NAME: Michelle Chopra is a 45 y o  male  : 1970    MRN: [id number]  DATE: 2020  TIME: 12:52 PM    Assessment and Plan   Moderate single current episode of major depressive disorder ([address] ) [F32 1]  1  Moderate single current episode of major depressive disorder (HCC)  ARIPiprazole (ABILIFY) 5 mg tablet    FLUoxetine (PROzac) 40 MG capsule   2  Generalized anxiety disorder  ARIPiprazole (ABILIFY) 5 mg tablet    FLUoxetine (PROzac) 40 MG capsule   3  Mild intermittent asthma without complication     4  Impaired fasting blood sugar         No problem-specific Assessment & Plan notes found for this encounter  Patient Instructions     Patient Instructions   Continue current medications for depression anxiety-fasting sugar was significantly elevated, we need to repeat this in about 2-3 months to verify whether you switch to the diabetic range or this was bad dietary habits  If able, check fasting blood sugar in 2-3 weeks  I would like to see this below 120-130  Chief Complaint   No chief complaint on file  History of Present Illness       Patient comes in for medical follow-up-patient has lost his insurance  Had been off is antidepressant medications for while, however realize he needed to go back on them  Lab review shows fasting sugar 188  Patient believes was was fasting  He has been in the prediabetic range in the past   He does report his diet has not been as good as it was prior to the pandemic  Mother is diabetic he is able to check her sugars at home  Asthma generally has been stable  Anxiety somewhat increased due to pandemic  Review of Systems   Review of Systems   Constitutional: Negative for appetite change, chills, diaphoresis and fever  HENT: Negative for ear pain, rhinorrhea, sinus pressure and sore throat  Eyes: Negative for discharge, redness and itching     Respiratory: Negative for cough, shortness of breath and wheezing  Cardiovascular: Negative for chest pain and palpitations  Rapid or slow heart rate   Gastrointestinal: Negative for abdominal pain, diarrhea, nausea and vomiting  Psychiatric/Behavioral: Positive for dysphoric mood and sleep disturbance  Negative for confusion and suicidal ideas  The patient is nervous/anxious            Current Medications       Current Outpatient Medications:     ARIPiprazole (ABILIFY) 5 mg tablet, Take 1 tablet (5 mg total) by mouth daily, Disp: 30 tablet, Rfl: 5    FLUoxetine (PROzac) 40 MG capsule, Take 1 capsule (40 mg total) by mouth daily, Disp: 30 capsule, Rfl: 5    LORazepam (ATIVAN) 1 mg tablet, Take 1 tablet (1 mg total) by mouth every 8 (eight) hours as needed for anxiety, Disp: 60 tablet, Rfl: 0    melatonin 3 mg, Take 3-6mg by mouth daily at bedtime as needed for insomnia, Disp: 1 tablet, Rfl: 0    Current Allergies     Allergies as of 08/28/2020 - Reviewed 01/23/2020   Allergen Reaction Noted    Erythromycin Diarrhea 12/02/2016            The following portions of the patient's history were reviewed and updated as appropriate: allergies, current medications, past family history, past medical history, past social history, past surgical history and problem list      Past Medical History:   Diagnosis Date    Anxiety     Asthma     Depression     Diverticulitis     Hyperlipemia     RESOLVED [id number]    Medial meniscus tear     LAST ASSESSED [id number]    Psychiatric disorder     Psychiatric illness     Suicide attempt [facility])        Past Surgical History:   Procedure Laterality Date    ARTHROSCOPY KNEE Right     ONSET 7/3/2014    [place] LAP,SURG,COLECTOMY, PARTIAL, W/ANAST N/A 7/9/2019    Procedure: RESECTION COLON SIGMOID LAPAROSCOPIC WITH ANASTOMOSIS: INTRAOP COLONOSCOPY;  Surgeon: Meek Marrufo MD;  Location:  MAIN OR;  Service: General    TONSILLECTOMY AND ADENOIDECTOMY      TOOTH EXTRACTION      WISDOM TEETH       Family History Problem Relation Age of Onset    Diabetes Mother     Hypertension Mother     Prostate cancer Father     Substance Abuse Neg Hx     Mental illness Neg Hx          Medications have been verified  Objective   /78 (BP Location: Left arm, Patient Position: Sitting, Cuff Size: Extra-Large)   Pulse 87   Temp 98 2 °F (36 8 °C) (Tympanic)   Ht 5' 10" (1 778 m)   Wt 89 4 kg (197 lb)   SpO2 96%   BMI 28 27 kg/m²        Physical Exam     Physical Exam  Vitals signs reviewed  Constitutional:       General: He is not in acute distress  Appearance: He is well-developed  HENT:      Head: Normocephalic and atraumatic  Right Ear: Tympanic membrane and external ear normal  No drainage  Left Ear: Tympanic membrane normal  No drainage  Mouth/Throat:      Pharynx: No oropharyngeal exudate  Eyes:      General:         Right eye: No discharge  Left eye: No discharge  Conjunctiva/sclera: Conjunctivae normal    Neck:      Musculoskeletal: Normal range of motion and neck supple  Thyroid: No thyromegaly  Cardiovascular:      Rate and Rhythm: Normal rate and regular rhythm  Heart sounds: Normal heart sounds  Pulmonary:      Effort: Pulmonary effort is normal  No respiratory distress  Breath sounds: No wheezing or rales  Lymphadenopathy:      Cervical: No cervical adenopathy  Skin:     General: Skin is warm and dry

## 2020-09-03 NOTE — PROGRESS NOTES
NAME: Alex Fisher is a 51 y o  male  : 1970    MRN: [id number]      Assessment and Plan   Left lower quadrant pain [R10 32]  1  Left lower quadrant pain         Jazzmine Gamino was seen today for abdominal pain  Diagnoses and all orders for this visit:    Left lower quadrant pain    Sent to the [facility] ER due to the acute pain in lower left side for one day    Patient Instructions   Patient Instructions   Pt to go to [facility] ER for abdominal pain  ER made aware  Pt refused ambulance and will drive self       Proceed to ER if symptoms worsen  Chief Complaint     Chief Complaint   Patient presents with    Abdominal Pain     Lower left abdominal pain that started yesterday and got worse today; Hx of diverticulitis, last episode 4 years ago  Denies nausea, vomitting, and diarrhea  Pain occasionally radiates to the right side  History of Present Illness     Pt here today with lower left abdominal pain, present for the past 24 hours and getting worse  He has taken nothing for the pain, hx of diverticulitis in the past, last flare up 4 years ago  No nausea or vomiting and no diarrhea  Has pain with trying to have a BM  Has hx of internal hernias and had a flare up in the past of diverticulitis 4 years ago and recommended surgery at that time and never did  Has colonoscopy and no polyps noted in the past five years  Abdominal Pain   This is a new problem  The current episode started yesterday  The onset quality is sudden  The problem occurs constantly  The most recent episode lasted 1 day  The problem has been gradually worsening  The pain is located in the LLQ  The pain is at a severity of 10/10  The pain is severe  The quality of the pain is sharp  The abdominal pain does not radiate  Pertinent negatives include no anorexia, belching, constipation, diarrhea, dysuria, fever, flatus, headaches, hematuria, melena, myalgias, nausea or vomiting  The pain is aggravated by movement and palpation   The pain is relieved by nothing  He has tried acetaminophen for the symptoms  The treatment provided no relief  There is no history of irritable bowel syndrome  Review of Systems   Review of Systems   Constitutional: Negative for fever  Gastrointestinal: Positive for abdominal pain  Negative for abdominal distention, anorexia, blood in stool, constipation, diarrhea, flatus, melena, nausea, rectal pain and vomiting  Genitourinary: Negative for dysuria and hematuria  Musculoskeletal: Negative for myalgias  Neurological: Negative for headaches  Current Medications       Current Outpatient Prescriptions:     Albuterol Sulfate (PROAIR RESPICLICK) 104 (90 BASE) MCG/ACT AEPB, Inhale, Disp: , Rfl:     clonazePAM (KlonoPIN) 1 mg tablet, TAKE 1 TABLET BY MOUTH EVERY 8 HOURS, Disp: 90 tablet, Rfl: 2    Fluoxetine HCl, PMDD, 20 MG CAPS, Take 2 capsules (40 mg total) by mouth daily, Disp: 180 each, Rfl: 1    Current Allergies     Allergies as of 06/20/2018 - Reviewed 06/20/2018   Allergen Reaction Noted    Erythromycin Diarrhea 12/02/2016              Past Medical History:   Diagnosis Date    Anxiety     Asthma     Depression     Hyperlipemia     RESOLVED [id number]    Medial meniscus tear     LAST ASSESSED [id number]    Psychiatric disorder        Past Surgical History:   Procedure Laterality Date    ARTHROSCOPY KNEE Right     ONSET 7/3/2014    TONSILLECTOMY AND ADENOIDECTOMY      TOOTH EXTRACTION      WISDOM TEETH       Family History   Problem Relation Age of Onset    Diabetes Mother     Hypertension Mother     Prostate cancer Father     Substance Abuse Neg Hx     Mental illness Neg Hx          Medications have been verified      The following portions of the patient's history were reviewed and updated as appropriate: allergies, current medications, past family history, past medical history, past social history, past surgical history and problem list     Objective   /86   Pulse 88   Temp 97 6 °F (36 4 °C)   Resp (!) 24   Ht 5' 9" (1 753 m)   Wt 77 1 kg (170 lb)   BMI 25 10 kg/m²      Physical Exam     Physical Exam   Abdominal: Soft  He exhibits no distension, no ascites and no mass  There is no hepatosplenomegaly  There is tenderness in the left lower quadrant  There is rebound and guarding  There is no rigidity and no CVA tenderness  TTP the LLQ, no radiating of pain, however has a full bladder and making pain worse, denies pain into the left groin or scrotum, no penile discharge, no blood noted in urine or BM that he had yesterday         [facility] Complex [employer]air And Xenograft Text: The defect edges were debeveled with a #15 scalpel blade.  The primary defect was closed partially with a complex linear closure.  Given the location of the defect, shape of the defect and the proximity to free margins a xenograft was deemed most appropriate to repair the remaining defect.  The graft was trimmed to fit the size of the remaining defect.  The graft was then placed in the primary defect, oriented appropriately, and sutured into place.

## 2020-09-14 DIAGNOSIS — F41.1 GENERALIZED ANXIETY DISORDER: ICD-10-CM

## 2020-09-14 RX ORDER — LORAZEPAM 1 MG/1
TABLET ORAL
Qty: 60 TABLET | Refills: 0 | Status: SHIPPED | OUTPATIENT
Start: 2020-09-14 | End: 2020-10-07

## 2020-10-06 DIAGNOSIS — F41.1 GENERALIZED ANXIETY DISORDER: ICD-10-CM

## 2020-10-07 DIAGNOSIS — R73.01 IMPAIRED FASTING BLOOD SUGAR: Primary | ICD-10-CM

## 2020-10-07 RX ORDER — LORAZEPAM 1 MG/1
TABLET ORAL
Qty: 60 TABLET | Refills: 0 | Status: SHIPPED | OUTPATIENT
Start: 2020-10-07 | End: 2020-11-04

## 2020-11-04 DIAGNOSIS — F41.1 GENERALIZED ANXIETY DISORDER: ICD-10-CM

## 2020-11-04 RX ORDER — LORAZEPAM 1 MG/1
TABLET ORAL
Qty: 60 TABLET | Refills: 0 | Status: SHIPPED | OUTPATIENT
Start: 2020-11-04 | End: 2020-11-30

## 2020-11-05 LAB — HBA1C MFR BLD HPLC: 8 %

## 2020-11-11 ENCOUNTER — OFFICE VISIT (OUTPATIENT)
Dept: FAMILY MEDICINE CLINIC | Facility: CLINIC | Age: 50
End: 2020-11-11

## 2020-11-11 VITALS
OXYGEN SATURATION: 97 % | DIASTOLIC BLOOD PRESSURE: 88 MMHG | HEIGHT: 67 IN | TEMPERATURE: 98.1 F | RESPIRATION RATE: 20 BRPM | WEIGHT: 198.2 LBS | BODY MASS INDEX: 31.11 KG/M2 | SYSTOLIC BLOOD PRESSURE: 120 MMHG | HEART RATE: 90 BPM

## 2020-11-11 DIAGNOSIS — F41.1 GENERALIZED ANXIETY DISORDER: ICD-10-CM

## 2020-11-11 DIAGNOSIS — F32.1 MODERATE SINGLE CURRENT EPISODE OF MAJOR DEPRESSIVE DISORDER (HCC): ICD-10-CM

## 2020-11-11 DIAGNOSIS — E11.9 TYPE 2 DIABETES MELLITUS WITHOUT COMPLICATION, WITHOUT LONG-TERM CURRENT USE OF INSULIN (HCC): Primary | ICD-10-CM

## 2020-11-11 PROCEDURE — 99213 OFFICE O/P EST LOW 20 MIN: CPT | Performed by: FAMILY MEDICINE

## 2020-11-11 RX ORDER — FLUOXETINE HYDROCHLORIDE 20 MG/1
20 CAPSULE ORAL DAILY
Qty: 30 CAPSULE | Refills: 2 | Status: SHIPPED | OUTPATIENT
Start: 2020-11-11 | End: 2021-10-04 | Stop reason: ALTCHOICE

## 2020-11-11 RX ORDER — LISINOPRIL 10 MG/1
10 TABLET ORAL DAILY
Qty: 30 TABLET | Refills: 5 | Status: SHIPPED | OUTPATIENT
Start: 2020-11-11 | End: 2021-10-04 | Stop reason: SDUPTHER

## 2020-11-30 DIAGNOSIS — F41.1 GENERALIZED ANXIETY DISORDER: ICD-10-CM

## 2020-11-30 RX ORDER — LORAZEPAM 1 MG/1
TABLET ORAL
Qty: 60 TABLET | Refills: 0 | Status: SHIPPED | OUTPATIENT
Start: 2020-11-30 | End: 2020-12-29

## 2020-12-28 DIAGNOSIS — F41.1 GENERALIZED ANXIETY DISORDER: ICD-10-CM

## 2020-12-29 RX ORDER — LORAZEPAM 1 MG/1
TABLET ORAL
Qty: 60 TABLET | Refills: 0 | Status: SHIPPED | OUTPATIENT
Start: 2020-12-29 | End: 2021-01-25

## 2021-01-23 DIAGNOSIS — F41.1 GENERALIZED ANXIETY DISORDER: ICD-10-CM

## 2021-01-25 RX ORDER — LORAZEPAM 1 MG/1
TABLET ORAL
Qty: 60 TABLET | Refills: 0 | Status: SHIPPED | OUTPATIENT
Start: 2021-01-25 | End: 2021-02-20

## 2021-02-19 DIAGNOSIS — F41.1 GENERALIZED ANXIETY DISORDER: ICD-10-CM

## 2021-02-20 RX ORDER — LORAZEPAM 1 MG/1
TABLET ORAL
Qty: 60 TABLET | Refills: 0 | Status: SHIPPED | OUTPATIENT
Start: 2021-02-20 | End: 2021-03-20

## 2021-03-19 DIAGNOSIS — F41.1 GENERALIZED ANXIETY DISORDER: ICD-10-CM

## 2021-03-20 RX ORDER — LORAZEPAM 1 MG/1
TABLET ORAL
Qty: 60 TABLET | Refills: 0 | Status: SHIPPED | OUTPATIENT
Start: 2021-03-20 | End: 2021-04-22

## 2021-04-22 DIAGNOSIS — F41.1 GENERALIZED ANXIETY DISORDER: ICD-10-CM

## 2021-04-22 RX ORDER — LORAZEPAM 1 MG/1
TABLET ORAL
Qty: 60 TABLET | Refills: 0 | Status: SHIPPED | OUTPATIENT
Start: 2021-04-22 | End: 2021-05-25

## 2021-05-25 DIAGNOSIS — F41.1 GENERALIZED ANXIETY DISORDER: ICD-10-CM

## 2021-05-25 RX ORDER — LORAZEPAM 1 MG/1
TABLET ORAL
Qty: 60 TABLET | Refills: 0 | Status: SHIPPED | OUTPATIENT
Start: 2021-05-25 | End: 2021-05-26

## 2021-05-26 DIAGNOSIS — F41.1 GENERALIZED ANXIETY DISORDER: ICD-10-CM

## 2021-05-26 RX ORDER — LORAZEPAM 1 MG/1
TABLET ORAL
Qty: 60 TABLET | Refills: 0 | Status: SHIPPED | OUTPATIENT
Start: 2021-05-26 | End: 2021-09-13 | Stop reason: SDUPTHER

## 2021-05-28 ENCOUNTER — TELEPHONE (OUTPATIENT)
Dept: FAMILY MEDICINE CLINIC | Facility: CLINIC | Age: 51
End: 2021-05-28

## 2021-05-28 NOTE — TELEPHONE ENCOUNTER
----- Message from Watson Miguel MD sent at 5/28/2021  9:12 AM EDT -----  Regarding: Diabetes  Patient has active lab order  He may not have insurance right now  We could send him over to [employer] in pay cash for these  Then should have follow-up office visit

## 2021-09-13 DIAGNOSIS — F41.1 GENERALIZED ANXIETY DISORDER: ICD-10-CM

## 2021-09-13 RX ORDER — LORAZEPAM 1 MG/1
1 TABLET ORAL EVERY 8 HOURS PRN
Qty: 60 TABLET | Refills: 0 | Status: SHIPPED | OUTPATIENT
Start: 2021-09-13 | End: 2021-12-01

## 2021-09-25 LAB
BUN SERPL-MCNC: 10 MG/DL (ref 6–24)
BUN/CREAT SERPL: 11 (ref 9–20)
CALCIUM SERPL-MCNC: 9.9 MG/DL (ref 8.7–10.2)
CHLORIDE SERPL-SCNC: 100 MMOL/L (ref 96–106)
CO2 SERPL-SCNC: 22 MMOL/L (ref 20–29)
CREAT SERPL-MCNC: 0.91 MG/DL (ref 0.76–1.27)
GLUCOSE SERPL-MCNC: 224 MG/DL (ref 65–99)
HBA1C MFR BLD: 8.3 % (ref 4.8–5.6)
POTASSIUM SERPL-SCNC: 4.4 MMOL/L (ref 3.5–5.2)
SL AMB EGFR AFRICAN AMERICAN: 112 ML/MIN/1.73
SL AMB EGFR NON AFRICAN AMERICAN: 97 ML/MIN/1.73
SODIUM SERPL-SCNC: 136 MMOL/L (ref 134–144)

## 2021-09-27 ENCOUNTER — TELEPHONE (OUTPATIENT)
Dept: FAMILY MEDICINE CLINIC | Facility: CLINIC | Age: 51
End: 2021-09-27

## 2021-09-27 NOTE — TELEPHONE ENCOUNTER
----- Message from Amita Jin MD sent at 9/27/2021 10:34 AM EDT -----  Call patient with lab result-let patient know A1c and blood sugars are higher  He should try to improve his diet  Will discuss and review labs at office visit October 4th

## 2021-09-27 NOTE — RESULT ENCOUNTER NOTE
Call patient with lab result-let patient know A1c and blood sugars are higher  He should try to improve his diet  Will discuss and review labs at office visit October 4th

## 2021-10-04 ENCOUNTER — OFFICE VISIT (OUTPATIENT)
Dept: FAMILY MEDICINE CLINIC | Facility: CLINIC | Age: 51
End: 2021-10-04

## 2021-10-04 VITALS
WEIGHT: 183 LBS | SYSTOLIC BLOOD PRESSURE: 116 MMHG | HEIGHT: 67 IN | TEMPERATURE: 98.7 F | BODY MASS INDEX: 28.72 KG/M2 | DIASTOLIC BLOOD PRESSURE: 74 MMHG | HEART RATE: 108 BPM | OXYGEN SATURATION: 96 %

## 2021-10-04 DIAGNOSIS — J45.20 MILD INTERMITTENT ASTHMA WITHOUT COMPLICATION: ICD-10-CM

## 2021-10-04 DIAGNOSIS — F41.1 GENERALIZED ANXIETY DISORDER: ICD-10-CM

## 2021-10-04 DIAGNOSIS — E11.9 TYPE 2 DIABETES MELLITUS WITHOUT COMPLICATION, WITHOUT LONG-TERM CURRENT USE OF INSULIN (HCC): Primary | ICD-10-CM

## 2021-10-04 PROBLEM — F32.1 MODERATE SINGLE CURRENT EPISODE OF MAJOR DEPRESSIVE DISORDER (HCC): Status: RESOLVED | Noted: 2019-03-11 | Resolved: 2021-10-04

## 2021-10-04 PROCEDURE — 99213 OFFICE O/P EST LOW 20 MIN: CPT | Performed by: FAMILY MEDICINE

## 2021-10-04 RX ORDER — LISINOPRIL 10 MG/1
10 TABLET ORAL DAILY
Qty: 30 TABLET | Refills: 5 | Status: SHIPPED | OUTPATIENT
Start: 2021-10-04 | End: 2022-07-31

## 2021-10-04 RX ORDER — ATORVASTATIN CALCIUM 10 MG/1
10 TABLET, FILM COATED ORAL DAILY
Qty: 30 TABLET | Refills: 5 | Status: SHIPPED | OUTPATIENT
Start: 2021-10-04 | End: 2022-07-31

## 2021-10-04 RX ORDER — METFORMIN HYDROCHLORIDE 500 MG/1
1000 TABLET, EXTENDED RELEASE ORAL
Qty: 60 TABLET | Refills: 5 | Status: SHIPPED | OUTPATIENT
Start: 2021-10-04 | End: 2022-04-25

## 2021-11-30 DIAGNOSIS — F41.1 GENERALIZED ANXIETY DISORDER: ICD-10-CM

## 2021-12-01 RX ORDER — LORAZEPAM 1 MG/1
TABLET ORAL
Qty: 60 TABLET | Refills: 0 | Status: SHIPPED | OUTPATIENT
Start: 2021-12-01 | End: 2021-12-20

## 2021-12-14 ENCOUNTER — PATIENT OUTREACH (OUTPATIENT)
Dept: CASE MANAGEMENT | Facility: OTHER | Age: 51
End: 2021-12-14

## 2021-12-20 DIAGNOSIS — F41.1 GENERALIZED ANXIETY DISORDER: ICD-10-CM

## 2021-12-20 RX ORDER — LORAZEPAM 1 MG/1
TABLET ORAL
Qty: 60 TABLET | Refills: 0 | Status: SHIPPED | OUTPATIENT
Start: 2021-12-20 | End: 2021-12-21 | Stop reason: SDUPTHER

## 2021-12-21 DIAGNOSIS — F41.1 GENERALIZED ANXIETY DISORDER: ICD-10-CM

## 2021-12-21 RX ORDER — LORAZEPAM 1 MG/1
1 TABLET ORAL EVERY 8 HOURS PRN
Qty: 60 TABLET | Refills: 1 | Status: SHIPPED | OUTPATIENT
Start: 2021-12-21 | End: 2022-04-13 | Stop reason: SDUPTHER

## 2022-04-13 ENCOUNTER — TELEPHONE (OUTPATIENT)
Dept: FAMILY MEDICINE CLINIC | Facility: CLINIC | Age: 52
End: 2022-04-13

## 2022-04-13 DIAGNOSIS — E11.9 TYPE 2 DIABETES MELLITUS WITHOUT COMPLICATION, WITHOUT LONG-TERM CURRENT USE OF INSULIN (HCC): ICD-10-CM

## 2022-04-13 DIAGNOSIS — R73.01 IMPAIRED FASTING BLOOD SUGAR: Primary | ICD-10-CM

## 2022-04-13 DIAGNOSIS — F41.1 GENERALIZED ANXIETY DISORDER: ICD-10-CM

## 2022-04-13 RX ORDER — LORAZEPAM 1 MG/1
1 TABLET ORAL EVERY 8 HOURS PRN
Qty: 60 TABLET | Refills: 1 | Status: SHIPPED | OUTPATIENT
Start: 2022-04-13 | End: 2022-06-24

## 2022-04-13 NOTE — TELEPHONE ENCOUNTER
Pt is a self-pay pt requesting a medication refill (Lorazepam), a 6 mos  Diabetic check/ med review (Monday, latest appmt available), and the appropriate labs  Pt stated that he typically uses [facility], even as a self-pay pt  Need to confirm the correct booking and labs sent to correct lab  I will confirm the accurate details and then call him back to schedule today

## 2022-04-13 NOTE — TELEPHONE ENCOUNTER
Pt requests Lorazepam (1 mg) refill  Last rx written 12/21/21  Pt is scheduled for his 6 mos  Diabetic f/u with med review on Monday, 4/25/22  CMP & HGA1C ordered to [facility] (Pt's requested lab)  [address] [facility] [place] - [phone]    Please review

## 2022-04-23 LAB
ALBUMIN SERPL-MCNC: 4.8 G/DL (ref 3.8–4.9)
ALBUMIN/GLOB SERPL: 1.8 {RATIO} (ref 1.2–2.2)
ALP SERPL-CCNC: 68 IU/L (ref 44–121)
ALT SERPL-CCNC: 21 IU/L (ref 0–44)
AST SERPL-CCNC: 18 IU/L (ref 0–40)
BILIRUB SERPL-MCNC: 1.1 MG/DL (ref 0–1.2)
BUN SERPL-MCNC: 12 MG/DL (ref 6–24)
BUN/CREAT SERPL: 14 (ref 9–20)
CALCIUM SERPL-MCNC: 10 MG/DL (ref 8.7–10.2)
CHLORIDE SERPL-SCNC: 98 MMOL/L (ref 96–106)
CO2 SERPL-SCNC: 21 MMOL/L (ref 20–29)
CREAT SERPL-MCNC: 0.86 MG/DL (ref 0.76–1.27)
EGFR: 104 ML/MIN/1.73
EST. AVERAGE GLUCOSE BLD GHB EST-MCNC: 206 MG/DL
GLOBULIN SER-MCNC: 2.6 G/DL (ref 1.5–4.5)
GLUCOSE SERPL-MCNC: 259 MG/DL (ref 65–99)
HBA1C MFR BLD: 8.8 % (ref 4.8–5.6)
POTASSIUM SERPL-SCNC: 4.5 MMOL/L (ref 3.5–5.2)
PROT SERPL-MCNC: 7.4 G/DL (ref 6–8.5)
SODIUM SERPL-SCNC: 138 MMOL/L (ref 134–144)

## 2022-04-25 ENCOUNTER — TELEPHONE (OUTPATIENT)
Dept: FAMILY MEDICINE CLINIC | Facility: CLINIC | Age: 52
End: 2022-04-25

## 2022-04-25 ENCOUNTER — OFFICE VISIT (OUTPATIENT)
Dept: FAMILY MEDICINE CLINIC | Facility: CLINIC | Age: 52
End: 2022-04-25

## 2022-04-25 VITALS
OXYGEN SATURATION: 96 % | HEIGHT: 67 IN | TEMPERATURE: 96.4 F | SYSTOLIC BLOOD PRESSURE: 122 MMHG | BODY MASS INDEX: 28.56 KG/M2 | HEART RATE: 99 BPM | DIASTOLIC BLOOD PRESSURE: 74 MMHG | WEIGHT: 182 LBS

## 2022-04-25 DIAGNOSIS — E11.9 TYPE 2 DIABETES MELLITUS WITHOUT COMPLICATION, WITHOUT LONG-TERM CURRENT USE OF INSULIN (HCC): Primary | ICD-10-CM

## 2022-04-25 DIAGNOSIS — Z12.5 ENCOUNTER FOR PROSTATE CANCER SCREENING: ICD-10-CM

## 2022-04-25 DIAGNOSIS — F41.1 GENERALIZED ANXIETY DISORDER: ICD-10-CM

## 2022-04-25 DIAGNOSIS — J45.20 MILD INTERMITTENT ASTHMA WITHOUT COMPLICATION: ICD-10-CM

## 2022-04-25 PROBLEM — Z86.16 PERSONAL HISTORY OF COVID-19: Status: ACTIVE | Noted: 2022-04-25

## 2022-04-25 PROCEDURE — 99213 OFFICE O/P EST LOW 20 MIN: CPT | Performed by: FAMILY MEDICINE

## 2022-04-25 RX ORDER — CLONAZEPAM 2 MG/1
2 TABLET ORAL 2 TIMES DAILY
Qty: 60 TABLET | Refills: 2 | Status: SHIPPED | OUTPATIENT
Start: 2022-04-25

## 2022-04-25 RX ORDER — PIOGLITAZONEHYDROCHLORIDE 15 MG/1
15 TABLET ORAL DAILY
Qty: 30 TABLET | Refills: 5 | Status: SHIPPED | OUTPATIENT
Start: 2022-04-25 | End: 2022-06-28 | Stop reason: SDUPTHER

## 2022-04-25 NOTE — TELEPHONE ENCOUNTER
----- Message from Jamari Stuart MD sent at 4/24/2022 11:30 AM EDT -----  Results reviewed-will discuss at scheduled appointment-no changes needed now

## 2022-04-25 NOTE — PROGRESS NOTES
[id number]         NAME: Ivonne Diamond is a 45 y o  male  : 1970    MRN: [id number]  DATE: 2022  TIME: 5:21 PM    Assessment and Plan   Type 2 diabetes mellitus without complication, without long-term current use of insulin ([facility] [address] ) [E11 9]  1  Type 2 diabetes mellitus without complication, without long-term current use of insulin ([facility])  metFORMIN (GLUCOPHAGE) 1000 MG tablet    pioglitazone (ACTOS) 15 mg tablet    Hemoglobin A1C    Comprehensive metabolic panel    Lipid Panel with Direct LDL reflex   2  Mild intermittent asthma without complication     3  Generalized anxiety disorder  clonazePAM (KlonoPIN) 2 mg tablet   4  Encounter for prostate cancer screening  PSA, Total Screen       No problem-specific Assessment & Plan notes found for this encounter  Patient Instructions     Patient Instructions   Medical management-increase metformin to 1000 mg twice daily  Also add Actos 15 mg once in the morning  Switch to Klonopin 2 mg-1/2-1 tablet twice daily as needed for anxiety  Continue other medications  Repeat CMP, A1C, lipids, PSA          Chief Complaint     Chief Complaint   Patient presents with    Follow-up     MM/LABS         History of Present Illness       Patient comes in for follow-up  Recent labs are reviewed  Medications reviewed  1) diabetes-A1c is elevated  8 8  Patient admits that has a lot of stress eating  Is only taking metformin 500 mg 2 tablets daily  2) anxiety-increased anxiety recently  Finding take his lorazepam on a regular basis  3) asthma-this has been stable  Review of Systems   Review of Systems   Constitutional: Negative for activity change, appetite change, diaphoresis and fatigue  Respiratory: Negative for cough, chest tightness, shortness of breath and wheezing  Cardiovascular: Negative for chest pain, palpitations and leg swelling          Fast or slow heart rate   Gastrointestinal: Negative for abdominal pain, blood in stool, constipation, diarrhea, nausea and vomiting  Genitourinary: Negative for difficulty urinating, dysuria and frequency  Musculoskeletal: Negative for arthralgias, gait problem, joint swelling and myalgias  Neurological: Negative for dizziness, weakness, light-headedness, numbness and headaches  Psychiatric/Behavioral: Negative for agitation, confusion, dysphoric mood and sleep disturbance  The patient is nervous/anxious            Current Medications       Current Outpatient Medications:     atorvastatin (LIPITOR) 10 mg tablet, Take 1 tablet (10 mg total) by mouth daily, Disp: 30 tablet, Rfl: 5    lisinopril (ZESTRIL) 10 mg tablet, Take 1 tablet (10 mg total) by mouth daily, Disp: 30 tablet, Rfl: 5    LORazepam (ATIVAN) 1 mg tablet, Take 1 tablet (1 mg total) by mouth every 8 (eight) hours as needed for anxiety, Disp: 60 tablet, Rfl: 1    melatonin 3 mg, Take 3-6mg by mouth daily at bedtime as needed for insomnia, Disp: 1 tablet, Rfl: 0    clonazePAM (KlonoPIN) 2 mg tablet, Take 1 tablet (2 mg total) by mouth 2 (two) times a day, Disp: 60 tablet, Rfl: 2    metFORMIN (GLUCOPHAGE) 1000 MG tablet, Take 1 tablet (1,000 mg total) by mouth 2 (two) times a day with meals, Disp: 60 tablet, Rfl: 5    pioglitazone (ACTOS) 15 mg tablet, Take 1 tablet (15 mg total) by mouth daily, Disp: 30 tablet, Rfl: 5    Current Allergies     Allergies as of 04/25/2022 - Reviewed 04/25/2022   Allergen Reaction Noted    Erythromycin Diarrhea and Abdominal Pain 12/02/2016            The following portions of the patient's history were reviewed and updated as appropriate: allergies, current medications, past family history, past medical history, past social history, past surgical history and problem list      Past Medical History:   Diagnosis Date    Anxiety     Asthma     Depression     Diverticulitis     Hyperlipemia     RESOLVED [id number]    Medial meniscus tear     LAST ASSESSED [id number]    Moderate single current episode of major depressive disorder ([address] ) 3/11/2019    Psychiatric disorder     Psychiatric illness     Suicide attempt [facility])        Past Surgical History:   Procedure Laterality Date    ARTHROSCOPY KNEE Right     ONSET 7/3/2014    OR LAP,SURG,COLECTOMY, PARTIAL, W/ANAST N/A 7/9/2019    Procedure: RESECTION COLON SIGMOID LAPAROSCOPIC WITH ANASTOMOSIS: INTRAOP COLONOSCOPY;  Surgeon: Marcelina Mcgraw MD;  Location:  MAIN OR;  Service: General    TONSILLECTOMY AND ADENOIDECTOMY      TOOTH EXTRACTION      WISDOM TEETH       Family History   Problem Relation Age of Onset    Diabetes Mother     Hypertension Mother     Prostate cancer Father     Substance Abuse Neg Hx     Mental illness Neg Hx          Medications have been verified  Objective   /74   Pulse 99   Temp (!) 96 4 °F (35 8 °C) (Tympanic)   Ht 5' 6 9" (1 699 m)   Wt 82 6 kg (182 lb)   SpO2 96%   BMI 28 59 kg/m²        Physical Exam     Physical Exam  Constitutional:       Appearance: Normal appearance  HENT:      Head: Normocephalic and atraumatic  Mouth/Throat:      Mouth: Mucous membranes are moist    Cardiovascular:      Rate and Rhythm: Normal rate and regular rhythm  Pulses: no weak pulses          Dorsalis pedis pulses are 2+ on the right side and 2+ on the left side  Posterior tibial pulses are 2+ on the right side and 2+ on the left side  Heart sounds: Normal heart sounds  Pulmonary:      Effort: Pulmonary effort is normal       Breath sounds: Normal breath sounds  Musculoskeletal:      Right lower leg: No edema  Left lower leg: No edema  Feet:      Right foot:      Skin integrity: No ulcer, skin breakdown, erythema, warmth, callus or dry skin  Left foot:      Skin integrity: No ulcer, skin breakdown, erythema, warmth, callus or dry skin  Neurological:      Mental Status: He is alert     Psychiatric:         Mood and Affect: Mood normal          Behavior: Behavior normal           Patient's shoes and socks removed  Right Foot/Ankle   Right Foot Inspection  Skin Exam: skin normal and skin intact  No dry skin, no warmth, no callus, no erythema, no maceration, no abnormal color, no pre-ulcer, no ulcer and no callus  Toe Exam: ROM and strength within normal limits  Sensory   Vibration: intact  Proprioception: intact  Monofilament testing: intact    Vascular  Capillary refills: < 3 seconds  The right DP pulse is 2+  The right PT pulse is 2+  Left Foot/Ankle  Left Foot Inspection  Skin Exam: skin normal and skin intact  No dry skin, no warmth, no erythema, no maceration, normal color, no pre-ulcer, no ulcer and no callus  Toe Exam: ROM and strength within normal limits  Sensory   Vibration: intact  Proprioception: intact  Monofilament testing: intact    Vascular  Capillary refills: < 3 seconds  The left DP pulse is 2+  The left PT pulse is 2+       Assign Risk Category  No deformity present  No loss of protective sensation  No weak pulses  Risk: 0

## 2022-04-25 NOTE — PATIENT INSTRUCTIONS
Medical management-increase metformin to 1000 mg twice daily  Also add Actos 15 mg once in the morning  Switch to Klonopin 2 mg-1/2-1 tablet twice daily as needed for anxiety  Continue other medications    Repeat CMP, A1C, lipids, PSA

## 2022-04-27 ENCOUNTER — TELEPHONE (OUTPATIENT)
Dept: FAMILY MEDICINE CLINIC | Facility: CLINIC | Age: 52
End: 2022-04-27

## 2022-04-27 NOTE — TELEPHONE ENCOUNTER
MARJORIE Duvall @ [facility] DR ELIO MENDOZA in [facility] called in with a rx question  Pharmacy stated that Pt, Jennifer Gaxiola, is prescribed Clonazepam 2 mg but has a history of using Lorazepam 1 mg  Pharmacy would like to know if the order is correct and would you like to proceed with it? Please review and advise      Bev Miranda @ [phone]

## 2022-06-24 ENCOUNTER — HOSPITAL ENCOUNTER (EMERGENCY)
Facility: HOSPITAL | Age: 52
Discharge: HOME | End: 2022-06-24
Attending: EMERGENCY MEDICINE

## 2022-06-24 VITALS
BODY MASS INDEX: 28.25 KG/M2 | WEIGHT: 180 LBS | SYSTOLIC BLOOD PRESSURE: 134 MMHG | RESPIRATION RATE: 18 BRPM | DIASTOLIC BLOOD PRESSURE: 86 MMHG | HEIGHT: 67 IN | OXYGEN SATURATION: 98 % | TEMPERATURE: 98.4 F | HEART RATE: 105 BPM

## 2022-06-24 DIAGNOSIS — K04.7 DENTAL ABSCESS: Primary | ICD-10-CM

## 2022-06-24 PROCEDURE — 99282 EMERGENCY DEPT VISIT SF MDM: CPT

## 2022-06-24 PROCEDURE — 99284 EMERGENCY DEPT VISIT MOD MDM: CPT | Performed by: PHYSICIAN ASSISTANT

## 2022-06-24 RX ORDER — IBUPROFEN 600 MG/1
600 TABLET ORAL EVERY 6 HOURS PRN
Qty: 30 TABLET | Refills: 0 | Status: SHIPPED | OUTPATIENT
Start: 2022-06-24

## 2022-06-24 RX ORDER — AMOXICILLIN AND CLAVULANATE POTASSIUM 875; 125 MG/1; MG/1
1 TABLET, FILM COATED ORAL EVERY 12 HOURS
Qty: 14 TABLET | Refills: 0 | Status: SHIPPED | OUTPATIENT
Start: 2022-06-24 | End: 2022-07-01

## 2022-06-24 NOTE — ED PROVIDER NOTES
History  Chief Complaint   Patient presents with    Dental Pain     Patient presents to the ED with c/o mouth pain x2 days, states he called his dentist and they did not have any appointments and was told to come here      Patient is a 48 y/o M that presents to the ED with lower dental pain that started 2 days ago  He states the pain is worsening  He called his dentist and they cannot see him until next week  He denies fevers, chills, trouble breathing or swallowing  He has swelling of lower lip  He states he has not been to the dentist for awhile and has multiple dental caries  He states he took tylenol and motrin, but it didn't help his pain  He was recently taken off ativan, but is currently on klonopin  History provided by:  Patient  Dental Pain  Location:  Lower  Quality:  Aching  Severity:  Moderate  Onset quality:  Gradual  Duration:  2 days  Timing:  Constant  Progression:  Worsening  Chronicity:  New  Context: dental caries and poor dentition    Relieved by:  Nothing  Worsened by:  Cold food/drink, hot food/drink and touching  Ineffective treatments:  Acetaminophen and NSAIDs  Associated symptoms: facial pain, facial swelling and gum swelling    Associated symptoms: no congestion, no difficulty swallowing, no drooling, no fever, no neck pain, no neck swelling, no oral lesions and no trismus    Risk factors: lack of dental care and smoking        Prior to Admission Medications   Prescriptions Last Dose Informant Patient Reported?  Taking?   atorvastatin (LIPITOR) 10 mg tablet   No No   Sig: Take 1 tablet (10 mg total) by mouth daily   clonazePAM (KlonoPIN) 2 mg tablet   No No   Sig: Take 1 tablet (2 mg total) by mouth 2 (two) times a day   lisinopril (ZESTRIL) 10 mg tablet   No No   Sig: Take 1 tablet (10 mg total) by mouth daily   melatonin 3 mg  Self No No   Sig: Take 3-6mg by mouth daily at bedtime as needed for insomnia   metFORMIN (GLUCOPHAGE) 1000 MG tablet   No No   Sig: Take 1 tablet (1,000 mg total) by mouth 2 (two) times a day with meals   pioglitazone (ACTOS) 15 mg tablet   No No   Sig: Take 1 tablet (15 mg total) by mouth daily      Facility-Administered Medications: None       Past Medical History:   Diagnosis Date    Anxiety     Asthma     Depression     Diverticulitis     Hyperlipemia     RESOLVED [id number]    Medial meniscus tear     LAST ASSESSED [id number]    Moderate single current episode of major depressive disorder ([facility] [address] ) 3/11/2019    Psychiatric disorder     Psychiatric illness     [facility])        Past Surgical History:   Procedure Laterality Date    ARTHROSCOPY KNEE Right     ONSET 7/3/2014    [place] LAP,SURG,COLECTOMY, PARTIAL, W/ANAST N/A 7/9/2019    Procedure: RESECTION COLON SIGMOID LAPAROSCOPIC WITH ANASTOMOSIS: INTRAOP COLONOSCOPY;  Surgeon: Nathan Cevallos MD;  Location:  MAIN OR;  Service: General    TONSILLECTOMY AND ADENOIDECTOMY      TOOTH EXTRACTION      WISDOM TEETH       Family History   Problem Relation Age of Onset    Diabetes Mother     Hypertension Mother     Prostate cancer Father     Substance Abuse Neg Hx     Mental illness Neg Hx      I have reviewed and agree with the history as documented  E-Cigarette/Vaping    E-Cigarette Use Current Every Day User      E-Cigarette/Vaping Substances    Nicotine Yes     THC No     CBD Yes     Flavoring Yes     Other No      Social History     Tobacco Use    Smoking status: Current Every Day Smoker     Packs/day: 1 00     Years: 30 00     Pack years: 30 00     Types: Cigarettes    Smokeless tobacco: Current User   Vaping Use    Vaping Use: Every day    Substances: Nicotine, CBD, Flavoring   Substance Use Topics    Alcohol use: Not Currently     Comment: sober 4 years    Drug use: Yes     Types: Marijuana     Comment: MEDICAL MARIJUANA CARD        Review of Systems   Constitutional: Negative for chills and fever  HENT: Positive for dental problem and facial swelling   Negative for congestion, drooling, mouth sores, sinus pressure and trouble swallowing  Respiratory: Negative for cough and shortness of breath  Cardiovascular: Negative for chest pain  Musculoskeletal: Negative for neck pain  Skin: Negative for color change and rash  Neurological: Negative for dizziness, weakness and numbness  Psychiatric/Behavioral: Negative for confusion  All other systems reviewed and are negative  Physical Exam  Physical Exam  Vitals and nursing note reviewed  Constitutional:       General: He is not in acute distress  Appearance: Normal appearance  He is well-developed, well-groomed and normal weight  He is not ill-appearing or diaphoretic  HENT:      Head: Normocephalic and atraumatic  Jaw: No trismus or swelling  Right Ear: Hearing, tympanic membrane and external ear normal       Left Ear: Hearing, tympanic membrane and external ear normal       Nose: Nose normal       Mouth/Throat:      Mouth: Mucous membranes are moist       Dentition: Abnormal dentition  Dental tenderness, gingival swelling and dental caries present  No gum lesions  Pharynx: Oropharynx is clear  Eyes:      Conjunctiva/sclera: Conjunctivae normal       Pupils: Pupils are equal    Cardiovascular:      Rate and Rhythm: Regular rhythm  Tachycardia present  Heart sounds: Normal heart sounds  Pulmonary:      Effort: Pulmonary effort is normal       Breath sounds: Normal breath sounds  No wheezing, rhonchi or rales  Musculoskeletal:         General: Normal range of motion  Cervical back: Normal range of motion and neck supple  No crepitus  No pain with movement  Lymphadenopathy:      Cervical: Cervical adenopathy present  Right cervical: Superficial cervical adenopathy present  Left cervical: Superficial cervical adenopathy present  Skin:     General: Skin is warm and dry  Coloration: Skin is not jaundiced or pale  Findings: No rash     Neurological: General: No focal deficit present  Mental Status: He is alert and oriented to person, place, and time  Motor: No weakness  Psychiatric:         Mood and Affect: Mood is anxious  Behavior: Behavior is cooperative  Vital Signs  ED Triage Vitals [06/24/22 1019]   Temperature Pulse Respirations Blood Pressure SpO2   98 4 °F (36 9 °C) 105 18 134/86 98 %      Temp Source Heart Rate Source Patient Position - Orthostatic VS BP Location FiO2 (%)   Oral Monitor Sitting Left arm --      Pain Score       10 - Worst Possible Pain           Vitals:    06/24/22 1019   BP: 134/86   Pulse: 105   Patient Position - Orthostatic VS: Sitting         Visual Acuity      ED Medications  Medications - No data to display    Diagnostic Studies  Results Reviewed     None                 No orders to display              Procedures  Procedures         ED Course                               SBIRT 22yo+    Flowsheet Row Most Recent Value   SBIRT (23 yo +)    In order to provide better care to our patients, we are screening all of our patients for alcohol and drug use  Would it be okay to ask you these screening questions? Yes Filed at: 06/24/2022 1025   Initial Alcohol Screen: US AUDIT-C     1  How often do you have a drink containing alcohol? 0 Filed at: 06/24/2022 1025   2  How many drinks containing alcohol do you have on a typical day you are drinking? 0 Filed at: 06/24/2022 1025   3a  Male UNDER 65: How often do you have five or more drinks on one occasion? 0 Filed at: 06/24/2022 1025   3b  FEMALE Any Age, or MALE 65+: How often do you have 4 or more drinks on one occassion? 0 Filed at: 06/24/2022 1025   Audit-C Score 0 Filed at: 06/24/2022 1025   CARLOS: How many times in the past year have you    Used an illegal drug or used a prescription medication for non-medical reasons?  Never Filed at: 06/24/2022 1025                    MDM  Number of Diagnoses or Management Options  Dental abscess: new and does not require workup  Diagnosis management comments: Patient with dental pain, poor dentition, lack of dental care, will prescribe abx and motrin and refer to dental clinic  Return precautions given  No fevers, tachycardia most likely due to patient's anxiety  Patient Progress  Patient progress: stable      Disposition  Final diagnoses:   Dental abscess     Time reflects when diagnosis was documented in both MDM as applicable and the Disposition within this note     Time User Action Codes Description Comment    6/24/2022 10:25 AM Luis Mandujano [K04 7] Dental abscess       ED Disposition     ED Disposition   Discharge    Condition   Stable    Date/Time   Fri Jun 24, 2022 10:25 AM    Comment   Ivonne Gifford discharge to home/self care                 Follow-up Information     Follow up With Specialties Details Why Darshan  Schedule an appointment as soon as possible for a visit  For recheck [id number] [place] [address]  [phone]          Discharge Medication List as of 6/24/2022 10:26 AM      START taking these medications    Details   amoxicillin-clavulanate (AUGMENTIN) 875-125 mg per tablet Take 1 tablet by mouth every 12 (twelve) hours for 7 days, Starting Fri 6/24/2022, Until Fri 7/1/2022, Normal      ibuprofen (MOTRIN) 600 mg tablet Take 1 tablet (600 mg total) by mouth every 6 (six) hours as needed for mild pain, Starting Fri 6/24/2022, Normal         CONTINUE these medications which have NOT CHANGED    Details   atorvastatin (LIPITOR) 10 mg tablet Take 1 tablet (10 mg total) by mouth daily, Starting Mon 10/4/2021, Normal      clonazePAM (KlonoPIN) 2 mg tablet Take 1 tablet (2 mg total) by mouth 2 (two) times a day, Starting Mon 4/25/2022, Normal      lisinopril (ZESTRIL) 10 mg tablet Take 1 tablet (10 mg total) by mouth daily, Starting Mon 10/4/2021, Normal      melatonin 3 mg Take 3-6mg by mouth daily at bedtime as needed for insomnia, No Print      metFORMIN (GLUCOPHAGE) 1000 MG tablet Take 1 tablet (1,000 mg total) by mouth 2 (two) times a day with meals, Starting Mon 4/25/2022, Normal      pioglitazone (ACTOS) 15 mg tablet Take 1 tablet (15 mg total) by mouth daily, Starting Mon 4/25/2022, Normal             No discharge procedures on file      PDMP Review       Value Time User    PDMP Reviewed  Yes 4/13/2022  2:18 PM Hector Leon MD          ED Provider  Electronically Signed by           Lillian Riddle PA-C  06/24/22 [id number] Mariah [place] PA-C  06/24/22 5230

## 2022-06-24 NOTE — DISCHARGE INSTRUCTIONS
Warm soaks to chin  Take augmentin twice a day for next 7 days  Follow up with dentist for recheck  REturn to ER if symptoms worsen, fevers, unable to swallow

## 2022-06-27 ENCOUNTER — OFFICE VISIT (OUTPATIENT)
Dept: DENTISTRY | Facility: CLINIC | Age: 52
End: 2022-06-27

## 2022-06-27 ENCOUNTER — NURSE TRIAGE (OUTPATIENT)
Dept: OTHER | Facility: OTHER | Age: 52
End: 2022-06-27

## 2022-06-27 ENCOUNTER — TELEPHONE (OUTPATIENT)
Dept: FAMILY MEDICINE CLINIC | Facility: CLINIC | Age: 52
End: 2022-06-27

## 2022-06-27 VITALS — DIASTOLIC BLOOD PRESSURE: 100 MMHG | TEMPERATURE: 97.5 F | SYSTOLIC BLOOD PRESSURE: 148 MMHG | HEART RATE: 114 BPM

## 2022-06-27 DIAGNOSIS — K08.89 PAIN, DENTAL: Primary | ICD-10-CM

## 2022-06-27 PROCEDURE — D0220 INTRAORAL - PERIAPICAL FIRST RADIOGRAPHIC IMAGE: HCPCS | Performed by: DENTIST

## 2022-06-27 PROCEDURE — D0230 INTRAORAL - PERIAPICAL EACH ADDITIONAL RADIOGRAPHIC IMAGE: HCPCS | Performed by: DENTIST

## 2022-06-27 PROCEDURE — D0140 LIMITED ORAL EVALUATION - PROBLEM FOCUSED: HCPCS | Performed by: DENTIST

## 2022-06-27 NOTE — PROGRESS NOTES
Patient presents for limited exam with CC of "pain from lower front teeth"  Patient's last HbA1C was 8 8 and shows worsening trend  Blood Glucose taken today was 241 mg/dL  Discussed with patient that a consult with his PCP would be necessary before any Tx, but we could complete the limited exam today  Patient understands and agrees  Patient was seen in ED 3 days ago and prescribed Augmentin 875/125 q12h which he has been taking as prescribed  Reports reduced swelling and lessening pain  Advised patient to continue taking as prescribed  EOE shows slight lower lip swelling, tenderness of palpation of left and right submandibular glands, no tenderness of submental or cervical lymph nodes  IOE shows acute sensitivity of lower lip to palpation, lower incisors acutely sensitive to palpation and percussion  Lower incisors at least class I mobile, unable to fully assess due to patient discomfort  3 PAs of lower anterior teeth taken and intepreted showing severe periodontitis and possible internal or external resorption of roots of #24 and #25  Discussed findings with patient and recommend extraction of #24 and #25  Patient understands and is in agreement  Patient asked about pain medication, advised to continue motrin 600mg q6h as prescribed, and if needed can take tylenol in between  Advised patient to monitor his blood sugar closely as NSAIDs may lower his blood sugar  Advised patient to go to ER immediately if he develops difficulty breathing or swallowing  NV: Ext  24, 25 when med consult returned

## 2022-06-27 NOTE — TELEPHONE ENCOUNTER
Pt's wife reports that pt was seen at [place] [address] office today and they stated pt has an infection throughout his entire jaw but is unable to do any surgery due to his A1C being too high  Pt was referred to take Ibuprofen  Pt was told once again that Per Dr Alex Ramirez recommended Ibuprofen for his dental pain  However, pt's wife stated this was unacceptable and that the patient will need something stronger  Wife made aware that Dr Alex Ramirez would have to be the one to approve those kinds of medications and they are not Rx'ed   Wife became enraged and started yelling and cursing on the phone  Phone call ended then  TT out to on call provider-Dr Alex Ramirez to make aware of this  Reason for Disposition   Prescription refill request for a CONTROLLED substance (e g , narcotics, ADHD medicines)    Answer Assessment - Initial Assessment Questions  1  NAME of MEDICATION: "What medicine are you calling about?"      Opiods/Narcs   2  QUESTION: "What is your question?" (e g , medication refill, side effect)      " he needs something stronger"  3  PRESCRIBING HCP: "Who prescribed it?" Reason: if prescribed by specialist, call should be referred to that group  PCP   4   SYMPTOMS: "Do you have any symptoms?"      Dental pain    Protocols used: MEDICATION QUESTION CALL-ADULT-

## 2022-06-27 NOTE — TELEPHONE ENCOUNTER
Patient was in the ER 6/24 for mouth/gums infection and he went to a [address] and they prescribed him motrin  Patient states motrin isn't doing anything and wants to know if we would be able to prescribed him something more effective for the pain because eating soft food and the motrin isn't doing anything to make the pain better  [phone] [phone]

## 2022-06-27 NOTE — TELEPHONE ENCOUNTER
Regarding: Severe dental pain   ----- Message from [facility] sent at 6/27/2022  6:15 PM EDT -----  "My  is in a lot of dental" (I relayed the  message on his chart for ibuprofen 800 mg 3-4 daily to wife, but it seems she just wants opiods"

## 2022-06-28 DIAGNOSIS — E11.9 TYPE 2 DIABETES MELLITUS WITHOUT COMPLICATION, WITHOUT LONG-TERM CURRENT USE OF INSULIN (HCC): ICD-10-CM

## 2022-06-28 DIAGNOSIS — K08.89 PAIN, DENTAL: Primary | ICD-10-CM

## 2022-06-28 RX ORDER — PIOGLITAZONEHYDROCHLORIDE 30 MG/1
30 TABLET ORAL DAILY
Qty: 30 TABLET | Refills: 3 | Status: SHIPPED | OUTPATIENT
Start: 2022-06-28

## 2022-06-28 RX ORDER — TRAMADOL HYDROCHLORIDE 50 MG/1
50 TABLET ORAL EVERY 6 HOURS PRN
Qty: 15 TABLET | Refills: 0 | Status: SHIPPED | OUTPATIENT
Start: 2022-06-28

## 2022-06-28 NOTE — TELEPHONE ENCOUNTER
Patient informed agree with increasing actos to 30 mg daily  Patient will run out in proximately a week     Please send script to [facility]

## 2022-06-28 NOTE — TELEPHONE ENCOUNTER
Per patient he is taking medication as prescribed per PCP    Metformin   Actos  atorvastatin   Lisinopril    A1c 229 at the dentist and they want under 200    Patient stated this was taking after eating

## 2022-06-28 NOTE — TELEPHONE ENCOUNTER
Patient called in and is aware and he states he will try the ibuprofen and naproxen for the pain  If it doesn't work he will call back for the tramadol  Dentist Appointment Results    - Patient states that the dentist stated his A1C was too high and that they couldn't do any procedures  They took some x-rays and told him he may have to get some teeth pulled  And they told him to come back when he has his A1C down  He said they prescribed him motrin but it's doing absolutely nothing  He said the visit was about 15 minutes and he didn't get nothing out of it

## 2022-07-28 DIAGNOSIS — E11.9 TYPE 2 DIABETES MELLITUS WITHOUT COMPLICATION, WITHOUT LONG-TERM CURRENT USE OF INSULIN (HCC): ICD-10-CM

## 2022-07-31 RX ORDER — ATORVASTATIN CALCIUM 10 MG/1
TABLET, FILM COATED ORAL
Qty: 30 TABLET | Refills: 0 | Status: SHIPPED | OUTPATIENT
Start: 2022-07-31 | End: 2022-08-29

## 2022-07-31 RX ORDER — LISINOPRIL 10 MG/1
TABLET ORAL
Qty: 30 TABLET | Refills: 0 | Status: SHIPPED | OUTPATIENT
Start: 2022-07-31 | End: 2022-08-27

## 2022-08-27 DIAGNOSIS — E11.9 TYPE 2 DIABETES MELLITUS WITHOUT COMPLICATION, WITHOUT LONG-TERM CURRENT USE OF INSULIN (HCC): ICD-10-CM

## 2022-08-27 RX ORDER — LISINOPRIL 10 MG/1
TABLET ORAL
Qty: 30 TABLET | Refills: 0 | Status: SHIPPED | OUTPATIENT
Start: 2022-08-27 | End: 2022-09-30

## 2022-08-29 RX ORDER — ATORVASTATIN CALCIUM 10 MG/1
TABLET, FILM COATED ORAL
Qty: 30 TABLET | Refills: 0 | Status: SHIPPED | OUTPATIENT
Start: 2022-08-29 | End: 2022-10-03

## 2022-09-30 DIAGNOSIS — E11.9 TYPE 2 DIABETES MELLITUS WITHOUT COMPLICATION, WITHOUT LONG-TERM CURRENT USE OF INSULIN (HCC): ICD-10-CM

## 2022-09-30 RX ORDER — LISINOPRIL 10 MG/1
TABLET ORAL
Qty: 30 TABLET | Refills: 0 | Status: SHIPPED | OUTPATIENT
Start: 2022-09-30

## 2022-10-03 RX ORDER — ATORVASTATIN CALCIUM 10 MG/1
TABLET, FILM COATED ORAL
Qty: 30 TABLET | Refills: 0 | Status: SHIPPED | OUTPATIENT
Start: 2022-10-03

## 2022-10-21 DIAGNOSIS — F41.1 GENERALIZED ANXIETY DISORDER: ICD-10-CM

## 2022-10-21 LAB — HBA1C MFR BLD HPLC: 7.3 %

## 2022-10-21 RX ORDER — CLONAZEPAM 2 MG/1
2 TABLET ORAL 2 TIMES DAILY
Qty: 60 TABLET | Refills: 0 | Status: SHIPPED | OUTPATIENT
Start: 2022-10-21

## 2022-11-07 ENCOUNTER — OFFICE VISIT (OUTPATIENT)
Dept: FAMILY MEDICINE CLINIC | Facility: CLINIC | Age: 52
End: 2022-11-07

## 2022-11-07 VITALS
SYSTOLIC BLOOD PRESSURE: 110 MMHG | DIASTOLIC BLOOD PRESSURE: 70 MMHG | WEIGHT: 188 LBS | OXYGEN SATURATION: 99 % | HEART RATE: 68 BPM | TEMPERATURE: 98.7 F | HEIGHT: 69 IN | BODY MASS INDEX: 27.85 KG/M2

## 2022-11-07 DIAGNOSIS — F41.1 GENERALIZED ANXIETY DISORDER: ICD-10-CM

## 2022-11-07 DIAGNOSIS — J45.20 MILD INTERMITTENT ASTHMA WITHOUT COMPLICATION: ICD-10-CM

## 2022-11-07 DIAGNOSIS — E11.9 TYPE 2 DIABETES MELLITUS WITHOUT COMPLICATION, WITHOUT LONG-TERM CURRENT USE OF INSULIN (HCC): Primary | ICD-10-CM

## 2022-11-07 RX ORDER — LISINOPRIL 10 MG/1
10 TABLET ORAL DAILY
Qty: 30 TABLET | Refills: 5 | Status: SHIPPED | OUTPATIENT
Start: 2022-11-07

## 2022-11-07 NOTE — PROGRESS NOTES
[id number]         NAME: Liseth Bermudez is a 47 y o  male  : 1970    MRN: [id number]  DATE: 2022  TIME: 2:03 PM    Assessment and Plan   Type 2 diabetes mellitus without complication, without long-term [facility] [E11 9]  1  Type 2 diabetes mellitus without complication, without long-term current use of insulin ([facility])  lisinopril (ZESTRIL) 10 mg tablet   2  Mild intermittent asthma without complication     3  Generalized anxiety disorder         No problem-specific Assessment & Plan notes found for this encounter  Patient Instructions     Patient Instructions   Continue current medications  Continue work on diet, exercise, weight loss  Repeat BMP and A1c in 6 months  Get diabetic eye exam           Chief Complaint     Chief Complaint   Patient presents with   • Diabetes         History of Present Illness       Patient for medical follow-up  Recent labs reviewed  Medications reconciled  1) diabetes-A1c at some 0 3 but much improvement since last time  2) asthma-stable  3) anxiety-stable current meds  Review of Systems   Review of Systems   Constitutional: Negative for appetite change, chills, diaphoresis and fever  HENT: Negative for congestion, ear pain, rhinorrhea, sinus pressure and sore throat  Eyes: Negative for discharge, redness and itching  Respiratory: Negative for cough, shortness of breath and wheezing  Cardiovascular: Negative for chest pain and palpitations  Rapid or slow heart rate   Gastrointestinal: Negative for diarrhea, nausea and vomiting  Psychiatric/Behavioral: Negative for decreased concentration, dysphoric mood and sleep disturbance  The patient is nervous/anxious            Current Medications       Current Outpatient Medications:   •  atorvastatin (LIPITOR) 10 mg tablet, Take 1 tablet by mouth once daily, Disp: 30 tablet, Rfl: 0  •  clonazePAM (KlonoPIN) 2 mg tablet, Take 1 tablet (2 mg total) by mouth 2 (two) times a day, Disp: 60 tablet, Rfl: 0  •  lisinopril (ZESTRIL) 10 mg tablet, Take 1 tablet (10 mg total) by mouth daily, Disp: 30 tablet, Rfl: 5  •  melatonin 3 mg, Take 3-6mg by mouth daily at bedtime as needed for insomnia, Disp: 1 tablet, Rfl: 0  •  metFORMIN (GLUCOPHAGE) 1000 MG tablet, Take 1 tablet (1,000 mg total) by mouth 2 (two) times a day with meals, Disp: 60 tablet, Rfl: 5  •  pioglitazone (ACTOS) 30 mg tablet, Take 1 tablet (30 mg total) by mouth daily, Disp: 30 tablet, Rfl: 3  •  ibuprofen (MOTRIN) 600 mg tablet, Take 1 tablet (600 mg total) by mouth every 6 (six) hours as needed for mild pain, Disp: 30 tablet, Rfl: 0  •  traMADol (Ultram) 50 mg tablet, Take 1 tablet (50 mg total) by mouth every 6 (six) hours as needed for moderate pain, Disp: 15 tablet, Rfl: 0    Current Allergies     Allergies as of 11/07/2022 - Reviewed 11/07/2022   Allergen Reaction Noted   • Erythromycin Diarrhea and Abdominal Pain 12/02/2016            The following portions of the patient's history were reviewed and updated as appropriate: allergies, current medications, past family history, past medical history, past social history, past surgical history and problem list      Past Medical History:   Diagnosis Date   • Anxiety    • Asthma    • Depression    • Diverticulitis    • Hyperlipemia     RESOLVED [id number]   • Medial meniscus tear     LAST ASSESSED [id number]   • Moderate single current episode of major depressive disorder ([facility] [address] ) 3/11/2019   • Psychiatric disorder    • Psychiatric illness    • Suicide attempt [facility])        Past Surgical History:   Procedure Laterality Date   • ARTHROSCOPY KNEE Right     ONSET 7/3/2014   • [place] LAP,SURG,COLECTOMY, PARTIAL, W/ANAST N/A 7/9/2019    Procedure: RESECTION COLON SIGMOID LAPAROSCOPIC WITH ANASTOMOSIS: INTRAOP COLONOSCOPY;  Surgeon: Melany Toledo MD;  Location:  MAIN OR;  Service: General   • TONSILLECTOMY AND ADENOIDECTOMY     • TOOTH EXTRACTION WISDOM TEETH       Family History   Problem Relation Age of Onset   • Diabetes Mother    • Hypertension Mother    • Prostate cancer Father    • Substance Abuse Neg Hx    • Mental illness Neg Hx          Medications have been verified  Objective   /70 (BP Location: Left arm, Patient Position: Sitting, Cuff Size: Standard)   Pulse 68   Temp 98 7 °F (37 1 °C) (Tympanic)   Ht 5' 9" (1 753 m)   Wt 85 3 kg (188 lb)   SpO2 99%   BMI 27 76 kg/m²        Physical Exam     Physical Exam  Vitals reviewed  Constitutional:       General: He is not in acute distress  Appearance: He is well-developed  He is not ill-appearing  HENT:      Head: Normocephalic and atraumatic  Right Ear: Tympanic membrane and external ear normal  No drainage  Left Ear: Tympanic membrane normal  No drainage  Nose: No congestion  Mouth/Throat:      Pharynx: No oropharyngeal exudate or posterior oropharyngeal erythema  Eyes:      General:         Right eye: No discharge  Left eye: No discharge  Extraocular Movements: Extraocular movements intact  Conjunctiva/sclera: Conjunctivae normal       Pupils: Pupils are equal, round, and reactive to light  Neck:      Thyroid: No thyromegaly  Cardiovascular:      Rate and Rhythm: Normal rate and regular rhythm  Heart sounds: Normal heart sounds  Pulmonary:      Effort: Pulmonary effort is normal  No respiratory distress  Breath sounds: No wheezing or rales  Musculoskeletal:      Cervical back: Normal range of motion and neck supple  Lymphadenopathy:      Cervical: No cervical adenopathy  Neurological:      Mental Status: He is alert     Psychiatric:         Mood and Affect: Mood normal          Behavior: Behavior normal

## 2022-11-07 NOTE — PATIENT INSTRUCTIONS
Continue current medications  Continue work on diet, exercise, weight loss  Repeat BMP and A1c in 6 months    Get diabetic eye exam

## 2022-11-15 DIAGNOSIS — E11.9 TYPE 2 DIABETES MELLITUS WITHOUT COMPLICATION, WITHOUT LONG-TERM CURRENT USE OF INSULIN (HCC): ICD-10-CM

## 2022-11-15 RX ORDER — PIOGLITAZONEHYDROCHLORIDE 30 MG/1
TABLET ORAL
Qty: 30 TABLET | Refills: 0 | Status: SHIPPED | OUTPATIENT
Start: 2022-11-15

## 2022-11-30 DIAGNOSIS — F41.1 GENERALIZED ANXIETY DISORDER: ICD-10-CM

## 2022-12-01 DIAGNOSIS — E11.9 TYPE 2 DIABETES MELLITUS WITHOUT COMPLICATION, WITHOUT LONG-TERM CURRENT USE OF INSULIN (HCC): ICD-10-CM

## 2022-12-01 RX ORDER — PIOGLITAZONEHYDROCHLORIDE 30 MG/1
30 TABLET ORAL DAILY
Qty: 30 TABLET | Refills: 0 | Status: SHIPPED | OUTPATIENT
Start: 2022-12-01

## 2022-12-01 RX ORDER — CLONAZEPAM 2 MG/1
TABLET ORAL
Qty: 60 TABLET | Refills: 0 | Status: SHIPPED | OUTPATIENT
Start: 2022-12-01

## 2022-12-31 DIAGNOSIS — E11.9 TYPE 2 DIABETES MELLITUS WITHOUT COMPLICATION, WITHOUT LONG-TERM CURRENT USE OF INSULIN (HCC): ICD-10-CM

## 2022-12-31 RX ORDER — PIOGLITAZONEHYDROCHLORIDE 30 MG/1
TABLET ORAL
Qty: 30 TABLET | Refills: 0 | Status: SHIPPED | OUTPATIENT
Start: 2022-12-31

## 2023-01-03 DIAGNOSIS — F41.1 GENERALIZED ANXIETY DISORDER: ICD-10-CM

## 2023-01-03 RX ORDER — CLONAZEPAM 2 MG/1
TABLET ORAL
Qty: 60 TABLET | Refills: 0 | Status: SHIPPED | OUTPATIENT
Start: 2023-01-03

## 2023-02-03 DIAGNOSIS — E11.9 TYPE 2 DIABETES MELLITUS WITHOUT COMPLICATION, WITHOUT LONG-TERM CURRENT USE OF INSULIN (HCC): ICD-10-CM

## 2023-02-03 RX ORDER — PIOGLITAZONEHYDROCHLORIDE 30 MG/1
TABLET ORAL
Qty: 30 TABLET | Refills: 0 | Status: SHIPPED | OUTPATIENT
Start: 2023-02-03

## 2023-02-18 DIAGNOSIS — F41.1 GENERALIZED ANXIETY DISORDER: ICD-10-CM

## 2023-02-19 RX ORDER — CLONAZEPAM 2 MG/1
TABLET ORAL
Qty: 60 TABLET | Refills: 0 | Status: SHIPPED | OUTPATIENT
Start: 2023-02-19

## 2023-03-04 DIAGNOSIS — E11.9 TYPE 2 DIABETES MELLITUS WITHOUT COMPLICATION, WITHOUT LONG-TERM CURRENT USE OF INSULIN (HCC): ICD-10-CM

## 2023-03-04 RX ORDER — PIOGLITAZONEHYDROCHLORIDE 30 MG/1
TABLET ORAL
Qty: 30 TABLET | Refills: 0 | Status: SHIPPED | OUTPATIENT
Start: 2023-03-04

## 2023-05-03 DIAGNOSIS — E11.9 TYPE 2 DIABETES MELLITUS WITHOUT COMPLICATION, WITHOUT LONG-TERM CURRENT USE OF INSULIN (HCC): ICD-10-CM

## 2023-05-03 DIAGNOSIS — R73.01 IMPAIRED FASTING BLOOD SUGAR: Primary | ICD-10-CM

## 2023-05-14 LAB
ALBUMIN SERPL-MCNC: 4.4 G/DL (ref 3.8–4.9)
ALBUMIN/GLOB SERPL: 1.7 {RATIO} (ref 1.2–2.2)
ALP SERPL-CCNC: 56 IU/L (ref 44–121)
ALT SERPL-CCNC: 18 IU/L (ref 0–44)
AST SERPL-CCNC: 15 IU/L (ref 0–40)
BILIRUB SERPL-MCNC: 0.6 MG/DL (ref 0–1.2)
BUN SERPL-MCNC: 12 MG/DL (ref 6–24)
BUN/CREAT SERPL: 15 (ref 9–20)
CALCIUM SERPL-MCNC: 9.3 MG/DL (ref 8.7–10.2)
CHLORIDE SERPL-SCNC: 100 MMOL/L (ref 96–106)
CO2 SERPL-SCNC: 23 MMOL/L (ref 20–29)
CREAT SERPL-MCNC: 0.82 MG/DL (ref 0.76–1.27)
EGFR: 104 ML/MIN/1.73
EST. AVERAGE GLUCOSE BLD GHB EST-MCNC: 171 MG/DL
GLOBULIN SER-MCNC: 2.6 G/DL (ref 1.5–4.5)
GLUCOSE SERPL-MCNC: 175 MG/DL (ref 70–99)
HBA1C MFR BLD: 7.6 % (ref 4.8–5.6)
POTASSIUM SERPL-SCNC: 5.4 MMOL/L (ref 3.5–5.2)
PROT SERPL-MCNC: 7 G/DL (ref 6–8.5)
SODIUM SERPL-SCNC: 138 MMOL/L (ref 134–144)

## 2023-05-15 ENCOUNTER — OFFICE VISIT (OUTPATIENT)
Dept: FAMILY MEDICINE CLINIC | Facility: CLINIC | Age: 53
End: 2023-05-15

## 2023-05-15 VITALS
WEIGHT: 190 LBS | TEMPERATURE: 99.3 F | HEIGHT: 69 IN | OXYGEN SATURATION: 97 % | RESPIRATION RATE: 18 BRPM | SYSTOLIC BLOOD PRESSURE: 138 MMHG | DIASTOLIC BLOOD PRESSURE: 85 MMHG | BODY MASS INDEX: 28.14 KG/M2 | HEART RATE: 105 BPM

## 2023-05-15 DIAGNOSIS — E11.9 TYPE 2 DIABETES MELLITUS WITHOUT COMPLICATION, WITHOUT LONG-TERM CURRENT USE OF INSULIN (HCC): ICD-10-CM

## 2023-05-15 DIAGNOSIS — F17.210 CIGARETTE NICOTINE DEPENDENCE WITHOUT COMPLICATION: ICD-10-CM

## 2023-05-15 DIAGNOSIS — Z00.00 ANNUAL PHYSICAL EXAM: Primary | ICD-10-CM

## 2023-05-15 DIAGNOSIS — Z12.5 ENCOUNTER FOR PROSTATE CANCER SCREENING: ICD-10-CM

## 2023-05-15 DIAGNOSIS — E78.5 HYPERLIPIDEMIA ASSOCIATED WITH TYPE 2 DIABETES MELLITUS (HCC): ICD-10-CM

## 2023-05-15 DIAGNOSIS — J45.20 MILD INTERMITTENT ASTHMA WITHOUT COMPLICATION: ICD-10-CM

## 2023-05-15 DIAGNOSIS — E11.69 HYPERLIPIDEMIA ASSOCIATED WITH TYPE 2 DIABETES MELLITUS (HCC): ICD-10-CM

## 2023-05-15 DIAGNOSIS — F41.9 ANXIETY: ICD-10-CM

## 2023-05-15 RX ORDER — LISINOPRIL 10 MG/1
10 TABLET ORAL DAILY
Qty: 90 TABLET | Refills: 1 | Status: SHIPPED | OUTPATIENT
Start: 2023-05-15

## 2023-05-15 RX ORDER — ATORVASTATIN CALCIUM 10 MG/1
10 TABLET, FILM COATED ORAL DAILY
Qty: 90 TABLET | Refills: 1 | Status: SHIPPED | OUTPATIENT
Start: 2023-05-15

## 2023-05-15 RX ORDER — PIOGLITAZONEHYDROCHLORIDE 30 MG/1
30 TABLET ORAL DAILY
Qty: 90 TABLET | Refills: 1 | Status: SHIPPED | OUTPATIENT
Start: 2023-05-15

## 2023-05-15 NOTE — PATIENT INSTRUCTIONS
Medical management-continue current medications  Give trial of metformin 1000 mg in the a m  and 500 mg with your evening meal   I will send out the urine for protein  I would repeat your BMP and A1c in 3 months  I will add a PSA at the same time  Continue to work on diet, stay active, and lose a few more pounds  You should have a dental exam and stay current on your eye exams  Wellness Visit for Adults   AMBULATORY CARE:   A wellness visit  is when you see your healthcare provider to get screened for health problems  Your healthcare provider will also give you advice on how to stay healthy  Write down your questions so you remember to ask them  Ask your healthcare provider how often you should have a wellness visit  What happens at a wellness visit:  Your healthcare provider will ask about your health, and your family history of health problems  This includes high blood pressure, heart disease, and cancer  He or she will ask if you have symptoms that concern you, if you smoke, and about your mood  You may also be asked about your intake of medicines, supplements, food, and alcohol  Any of the following may be done: Your weight  will be checked  Your height may also be checked so your body mass index (BMI) can be calculated  Your BMI shows if you are at a healthy weight  Your blood pressure  and heart rate will be checked  Your temperature may also be checked  Blood and urine tests  may be done  Blood tests may be done to check your cholesterol levels  Abnormal cholesterol levels increase your risk for heart disease and stroke  You may also need a blood or urine test to check for diabetes if you are at increased risk  Urine tests may be done to look for signs of an infection or kidney disease  A physical exam  includes checking your heartbeat and lungs with a stethoscope  Your healthcare provider may also check your skin to look for sun damage  Screening tests  may be recommended   A screening test is done to check for diseases that may not cause symptoms  The screening tests you may need depend on your age, gender, family history, and lifestyle habits  For example, colorectal screening may be recommended if you are 48years old or older  Screening tests you need if you are a woman:   A Pap smear  is used to screen for cervical cancer  Pap smears are usually done every 3 to 5 years depending on your age  You may need them more often if you have had abnormal Pap smear test results in the past  Ask your healthcare provider how often you should have a Pap smear  A mammogram  is an x-ray of your breasts to screen for breast cancer  Experts recommend mammograms every 2 years starting at age 48 years  You may need a mammogram at age 52 years or younger if you have an increased risk for breast cancer  Talk to your healthcare provider about when you should start having mammograms and how often you need them  Vaccines you may need:   Get an influenza vaccine  every year  The influenza vaccine protects you from the flu  Several types of viruses cause the flu  The viruses change over time, so new vaccines are made each year  Get a tetanus-diphtheria (Td) booster vaccine  every 10 years  This vaccine protects you against tetanus and diphtheria  Tetanus is a severe infection that may cause painful muscle spasms and lockjaw  Diphtheria is a severe bacterial infection that causes a thick covering in the back of your mouth and throat  Get a human papillomavirus (HPV) vaccine  if you are female and aged 23 to 32 or male 23 to 24 and never received it  This vaccine protects you from HPV infection  HPV is the most common infection spread by sexual contact  HPV may also cause vaginal, penile, and anal cancers  Get a pneumococcal vaccine  if you are aged 72 years or older  The pneumococcal vaccine is an injection given to protect you from pneumococcal disease   Pneumococcal disease is an infection caused by pneumococcal bacteria  The infection may cause pneumonia, meningitis, or an ear infection  Get a shingles vaccine  if you are 60 or older, even if you have had shingles before  The shingles vaccine is an injection to protect you from the varicella-zoster virus  This is the same virus that causes chickenpox  Shingles is a painful rash that develops in people who had chickenpox or have been exposed to the virus  How to eat healthy:  My Plate is a model for planning healthy meals  It shows the types and amounts of foods that should go on your plate  Fruits and vegetables make up about half of your plate, and grains and protein make up the other half  A serving of dairy is included on the side of your plate  The amount of calories and serving sizes you need depends on your age, gender, weight, and height  Examples of healthy foods are listed below:  Eat a variety of vegetables  such as dark green, red, and orange vegetables  You can also include canned vegetables low in sodium (salt) and frozen vegetables without added butter or sauces  Eat a variety of fresh fruits , canned fruit in 100% juice, frozen fruit, and dried fruit  Include whole grains  At least half of the grains you eat should be whole grains  Examples include whole-wheat bread, wheat pasta, brown rice, and whole-grain cereals such as oatmeal     Eat a variety of protein foods such as seafood (fish and shellfish), lean meat, and poultry without skin (turkey and chicken)  Examples of lean meats include pork leg, shoulder, or tenderloin, and beef round, sirloin, tenderloin, and extra lean ground beef  Other protein foods include eggs and egg substitutes, beans, peas, soy products, nuts, and seeds  Choose low-fat dairy products such as skim or 1% milk or low-fat yogurt, cheese, and cottage cheese  Limit unhealthy fats  such as butter, hard margarine, and shortening         Exercise:  Exercise at least 30 minutes per day on most days of the week  Some examples of exercise include walking, biking, dancing, and swimming  You can also fit in more physical activity by taking the stairs instead of the elevator or parking farther away from stores  Include muscle strengthening activities 2 days each week  Regular exercise provides many health benefits  It helps you manage your weight, and decreases your risk for type 2 diabetes, heart disease, stroke, and high blood pressure  Exercise can also help improve your mood  Ask your healthcare provider about the best exercise plan for you  General health and safety guidelines:   Do not smoke  Nicotine and other chemicals in cigarettes and cigars can cause lung damage  Ask your healthcare provider for information if you currently smoke and need help to quit  E-cigarettes or smokeless tobacco still contain nicotine  Talk to your healthcare provider before you use these products  Limit alcohol  A drink of alcohol is 12 ounces of beer, 5 ounces of wine, or 1½ ounces of liquor  Lose weight, if needed  Being overweight increases your risk of certain health conditions  These include heart disease, high blood pressure, type 2 diabetes, and certain types of cancer  Protect your skin  Do not sunbathe or use tanning beds  Use sunscreen with a SPF 15 or higher  Apply sunscreen at least 15 minutes before you go outside  Reapply sunscreen every 2 hours  Wear protective clothing, hats, and sunglasses when you are outside  Drive safely  Always wear your seatbelt  Make sure everyone in your car wears a seatbelt  A seatbelt can save your life if you are in an accident  Do not use your cell phone when you are driving  This could distract you and cause an accident  Pull over if you need to make a call or send a text message  Practice safe sex  Use latex condoms if are sexually active and have more than one partner   Your healthcare provider may recommend screening tests for sexually transmitted infections (STIs)  Wear helmets, lifejackets, and protective gear  Always wear a helmet when you ride a bike or motorcycle, go skiing, or play sports that could cause a head injury  Wear protective equipment when you play sports  Wear a lifejacket when you are on a boat or doing water sports  © Copyright Orlie C[employer] 2022 Information is for End User's use only and may not be sold, redistributed or otherwise used for commercial purposes  The above information is an  only  It is not intended as medical advice for individual conditions or treatments  Talk to your doctor, nurse or pharmacist before following any medical regimen to see if it is safe and effective for you  Cigarette Smoking and Your Health   AMBULATORY CARE:   Risks to your health if you smoke:  Nicotine and other chemicals found in tobacco and e-cigarettes can damage every cell in your body  Even if you are a light smoker, you have an increased risk for cancer, heart disease, and lung disease  If you are pregnant or have diabetes, smoking increases your risk for complications  Nicotine can affect an adolescent's developing brain  This can lead to trouble thinking, learning, or paying attention  Benefits to your health if you stop smoking: You decrease respiratory symptoms such as coughing, wheezing, and shortness of breath  You reduce your risk for cancers of the lung, mouth, throat, kidney, bladder, pancreas, stomach, and cervix  If you already have cancer, you increase the benefits of chemotherapy  You also reduce your risk for cancer returning or a second cancer from developing  You reduce your risk for heart disease, blood clots, heart attack, and stroke  You reduce your risk for lung infections, and diseases such as pneumonia, asthma, chronic bronchitis, and emphysema  Your circulation improves  More oxygen can be delivered to your body   If you have diabetes, you lower your risk for complications, such as kidney, artery, and eye diseases  You also lower your risk for nerve damage  Nerve damage can lead to amputations, poor vision, and blindness  You improve your body's ability to heal and to fight infections  An adolescent can help his or her brain and body develop in a healthy way  Talk to your adolescent about all the health risks of nicotine  If you can, start talking about nicotine when your child is younger than 12 years  This may make it easier for him or her not to start using nicotine as a teenager or adult  Explain to him or her that it is best never to start  It can be hard to try to quit later  Benefits to the health of others if you stop smoking:  Tobacco is harmful to nonsmokers who breathe in your secondhand smoke  The following are ways the health of others around you may improve when you stop smoking: You lower the risks for lung cancer and heart disease in nonsmoking adults  If you are pregnant, you lower the risk for miscarriage, early delivery, low birth weight, and stillbirth  You also lower your baby's risk for SIDS, obesity, developmental delay, and neurobehavioral problems, such as ADHD  If you have children, you lower their risk for ear infections, colds, pneumonia, bronchitis, and asthma  Follow up with your doctor as directed:  Write down your questions so you remember to ask them during your visits  For support and more information:   [employer]  [address] [place] [address],[address]  [address]  Phone: [facility]  Phone: 4- 843 - [phone]  Web Address: Rojas Cortes  [facility]    Smokefree  gov  Phone: 5- 220 - 053-0486  Web Address: www smokefree  gov  © Copyright [employer] 2022 Information is for End User's use only and may not be sold, redistributed or otherwise used for commercial purposes  The above information is an  only  It is not intended as medical advice for individual conditions or treatments   Talk to your doctor, nurse or pharmacist before following any medical regimen to see if it is safe and effective for you

## 2023-05-15 NOTE — PROGRESS NOTES
[address]    NAME: Castillo Rod  AGE: 46 y o  SEX: male  : 1970     DATE: 5/15/2023     Assessment and Plan:     Problem List Items Addressed This Visit    None  Visit Diagnoses     Annual physical exam    -  Primary    BMI 28 0-28 9,adult              Immunizations and preventive care screenings were discussed with patient today  Appropriate education was printed on patient's after visit summary  Discussed risks and benefits of prostate cancer screening  We discussed the controversial history of PSA screening for prostate cancer in the [place] as well as the risk of over detection and over treatment of prostate cancer by way of PSA screening  The patient understands that PSA blood testing is an imperfect way to screen for prostate cancer and that elevated PSA levels in the blood may also be caused by infection, inflammation, prostatic trauma or manipulation, urological procedures, or by benign prostatic enlargement  The role of the digital rectal examination in prostate cancer screening was also discussed and I discussed with him that there is large interobserver variability in the findings of digital rectal examination  Counseling:  Alcohol/drug use: discussed moderation in alcohol intake, the recommendations for healthy alcohol use, and avoidance of illicit drug use  Dental Health: discussed importance of regular tooth brushing, flossing, and dental visits  · Exercise: the importance of regular exercise/physical activity was discussed  Recommend exercise 3-5 times per week for at least 30 minutes  No follow-ups on file       Chief Complaint:     Chief Complaint   Patient presents with   • Physical Exam     DM FOOT EXAM, MICRO URINE      History of Present Illness:     Adult Annual Physical   Patient here for a comprehensive physical exam  The patient reports see list     Diet and Physical Activity  · Diet/Nutrition: well balanced diet, limited junk food and consuming 3-5 servings of fruits/vegetables daily  · Exercise: walking and moderate cardiovascular exercise  Depression Screening  PHQ-2/9 Depression Screening    Little interest or pleasure in doing things: 0 - not at all  Feeling down, depressed, or hopeless: 0 - not at all  PHQ-2 Score: 0  PHQ-2 Interpretation: Negative depression screen       General Health  · Sleep: sleeps well and gets 7-8 hours of sleep on average  · Hearing: normal - bilateral   · Vision: wears glasses  · Dental: no dental visits for >1 year and brushes teeth once daily   Health  · Symptoms include: nocturia     Review of Systems:     Review of Systems   Constitutional: Negative for activity change, appetite change, diaphoresis and fatigue  HENT: Negative for congestion, sinus pressure and sore throat  Respiratory: Negative for cough, chest tightness, shortness of breath and wheezing  Cardiovascular: Negative for chest pain, palpitations and leg swelling  Fast or slow heart rate   Gastrointestinal: Negative for abdominal pain, blood in stool, constipation, diarrhea, nausea and vomiting  Genitourinary: Negative for difficulty urinating, dysuria, frequency and hematuria  Musculoskeletal: Negative for arthralgias, gait problem, joint swelling and myalgias  Neurological: Negative for dizziness, light-headedness and headaches  Psychiatric/Behavioral: Negative for agitation, confusion, dysphoric mood and sleep disturbance  The patient is not nervous/anxious         Past Medical History:     Past Medical History:   Diagnosis Date   • Anxiety    • Asthma    • Depression    • Diabetes mellitus ([facility] )    • Diverticulitis    • Hyperlipemia     RESOLVED [id number]   • Medial meniscus tear     LAST ASSESSED [id number]   • Moderate single current episode of major depressive disorder ([facility] ) 03/11/2019   • Psychiatric disorder    • Psychiatric illness    • Suicide attempt [facility])       Past Surgical History:     Past Surgical History:   Procedure Laterality Date   • ARTHROSCOPY KNEE Right     ONSET 7/3/2014   • PA LAPAROSCOPY COLECTOMY PARTIAL W/ANASTOMOSIS N/A 7/9/2019    Procedure: RESECTION COLON SIGMOID LAPAROSCOPIC WITH ANASTOMOSIS: INTRAOP COLONOSCOPY;  Surgeon: Seble Abrams MD;  Location:  MAIN OR;  Service: General   • TONSILLECTOMY AND ADENOIDECTOMY     • TOOTH EXTRACTION      WISDOM TEETH      Family History:     Family History   Problem Relation Age of Onset   • Diabetes Mother    • Hypertension Mother    • Prostate cancer Father    • Substance Abuse Neg Hx    • Mental illness Neg Hx       Social History:     Social History     Socioeconomic History   • Marital status: /Civil Union     Spouse name: None   • Number of children: None   • Years of education: None   • Highest education level: None   Occupational History   • None   Tobacco Use   • Smoking status: Every Day     Packs/day: 1 00     Years: 30 00     Pack years: 30 00     Types: Cigarettes   • Smokeless tobacco: Current   Vaping Use   • Vaping Use: Every day   • Substances: Nicotine, CBD, Flavoring   Substance and Sexual Activity   • Alcohol use: Not Currently     Comment: couple beers a day   • Drug use: Not Currently     Types: Marijuana     Comment: MEDICAL MARIJUANA CARD    • Sexual activity: Not Currently     Partners: Female     Birth control/protection: None   Other Topics Concern   • None   Social History Narrative    CAFFEINE USE     Social Determinants of Health     Financial Resource Strain: Not on file   Food Insecurity: Not on file   Transportation Needs: Not on file   Physical Activity: Not on file   Stress: Not on file   Social Connections: Not on file   Intimate Partner Violence: Not on file   Housing Stability: Not on file      Current Medications:     Current Outpatient Medications   Medication Sig Dispense Refill   • atorvastatin (LIPITOR) 10 mg tablet "Take 1 tablet by mouth once daily 30 tablet 5   • clonazePAM (KlonoPIN) 2 mg tablet Take 1 tablet by mouth twice daily 60 tablet 0   • lisinopril (ZESTRIL) 10 mg tablet Take 1 tablet (10 mg total) by mouth daily 30 tablet 5   • melatonin 3 mg Take 3-6mg by mouth daily at bedtime as needed for insomnia 1 tablet 0   • metFORMIN (GLUCOPHAGE) 1000 MG tablet Take 1 tablet (1,000 mg total) by mouth 2 (two) times a day with meals 60 tablet 5   • pioglitazone (ACTOS) 30 mg tablet Take 1 tablet by mouth once daily 30 tablet 0     No current facility-administered medications for this visit  Allergies: Allergies   Allergen Reactions   • Erythromycin Diarrhea and Abdominal Pain      Physical Exam:     /88 (BP Location: Left arm, Patient Position: Sitting, Cuff Size: Adult)   Pulse 105   Temp 99 3 °F (37 4 °C) (Tympanic)   Resp 18   Ht 5' 9\" (1 753 m)   Wt 86 2 kg (190 lb)   SpO2 97%   BMI 28 06 kg/m²     Physical Exam  Vitals and nursing note reviewed  Constitutional:       General: He is not in acute distress  Appearance: He is not ill-appearing  HENT:      Head: Normocephalic and atraumatic  Right Ear: Tympanic membrane, ear canal and external ear normal       Left Ear: Tympanic membrane, ear canal and external ear normal       Nose: Nose normal       Mouth/Throat:      Pharynx: No posterior oropharyngeal erythema  Eyes:      General:         Right eye: No discharge  Left eye: No discharge  Extraocular Movements: Extraocular movements intact  Conjunctiva/sclera: Conjunctivae normal       Pupils: Pupils are equal, round, and reactive to light  Neck:      Thyroid: No thyromegaly  Vascular: No carotid bruit  Trachea: No tracheal deviation  Cardiovascular:      Rate and Rhythm: Normal rate and regular rhythm  Heart sounds: Normal heart sounds  No murmur heard  Pulmonary:      Effort: Pulmonary effort is normal  No respiratory distress        Breath sounds: " Normal breath sounds  No wheezing  Abdominal:      General: Bowel sounds are normal  There is no distension  Palpations: Abdomen is soft  There is no mass  Tenderness: There is no abdominal tenderness  There is no guarding  Musculoskeletal:      Cervical back: Normal range of motion and neck supple  Right lower leg: No edema  Left lower leg: No edema  Lymphadenopathy:      Cervical: No cervical adenopathy  Neurological:      General: No focal deficit present  Mental Status: He is alert and oriented to person, place, and time  Cranial Nerves: No cranial nerve deficit        Deep Tendon Reflexes: Reflexes normal    Psychiatric:         Mood and Affect: Mood normal          Behavior: Behavior normal           Esme Leija MD  [id number]

## 2023-05-15 NOTE — PROGRESS NOTES
BMI Counseling: Body mass index is 28 06 kg/m²  The BMI is above normal  Nutrition recommendations include decreasing portion sizes and moderation in carbohydrate intake  Exercise recommendations include moderate physical activity 150 minutes/week  No pharmacotherapy was ordered  Rationale for BMI follow-up plan is due to patient being overweight or obese  Depression Screening and Follow-up Plan: Patient was screened for depression during today's encounter  They screened negative with a PHQ-2 score of 0  Subjective:   Chief Complaint   Patient presents with   • Physical Exam     DM FOOT EXAM, MICRO URINE        Patient ID: Micheal Casas is a 46 y o  male  Medical management-recent labs reviewed  Medications reviewed  1) diabetes-A1c is elevated at 7 7%  Patient states he is only been taking metformin 1000 mg daily due to GI upset/diarrhea with second dosing  Also his diet may not have been as strict as previously  2) hyperlipidemia-remains in a good range on atorvastatin  3) mild asthma-stable  4) anxiety-stable use of clonazepam   Oppressive symptoms have resolved  5) patient still smoking  The following portions of the patient's history were reviewed and updated as appropriate: allergies, current medications, past family history, past medical history, past social history, past surgical history and problem list     Review of Systems   Constitutional: Negative for appetite change, chills, diaphoresis and fever  HENT: Negative for congestion, ear pain, rhinorrhea, sinus pressure and sore throat  Eyes: Negative for discharge, redness and itching  Respiratory: Negative for cough, shortness of breath and wheezing  Cardiovascular: Negative for chest pain and palpitations  Rapid or slow heart rate   Gastrointestinal: Negative for diarrhea, nausea and vomiting  Psychiatric/Behavioral: Negative for behavioral problems, dysphoric mood and sleep disturbance   The patient is "nervous/anxious  Objective:  Vitals:    05/15/23 1716 05/15/23 1747   BP: 150/88 138/85   BP Location: Left arm    Patient Position: Sitting    Cuff Size: Adult    Pulse: 105    Resp: 18    Temp: 99 3 °F (37 4 °C)    TempSrc: Tympanic    SpO2: 97%    Weight: 86 2 kg (190 lb)    Height: 5' 9\" (1 753 m)       Physical Exam  Vitals reviewed  Constitutional:       General: He is not in acute distress  Appearance: He is well-developed  He is not ill-appearing  HENT:      Head: Normocephalic and atraumatic  Right Ear: Tympanic membrane and external ear normal  No drainage  Left Ear: Tympanic membrane normal  No drainage  Nose: Congestion present  Mouth/Throat:      Pharynx: No oropharyngeal exudate or posterior oropharyngeal erythema  Eyes:      General:         Right eye: No discharge  Left eye: No discharge  Extraocular Movements: Extraocular movements intact  Conjunctiva/sclera: Conjunctivae normal       Pupils: Pupils are equal, round, and reactive to light  Neck:      Thyroid: No thyromegaly  Cardiovascular:      Rate and Rhythm: Normal rate and regular rhythm  Pulses: no weak pulses          Dorsalis pedis pulses are 2+ on the right side and 2+ on the left side  Posterior tibial pulses are 2+ on the right side and 2+ on the left side  Heart sounds: Normal heart sounds  Pulmonary:      Effort: Pulmonary effort is normal  No respiratory distress  Breath sounds: No wheezing or rales  Musculoskeletal:      Cervical back: Normal range of motion and neck supple  Right lower leg: No edema  Left lower leg: No edema  Feet:      Right foot:      Skin integrity: No ulcer, skin breakdown, erythema, warmth, callus or dry skin  Left foot:      Skin integrity: No ulcer, skin breakdown, erythema, warmth, callus or dry skin  Lymphadenopathy:      Cervical: No cervical adenopathy     Neurological:      Mental Status: He is " alert  Patient's shoes and socks removed  Right Foot/Ankle   Right Foot Inspection  Skin Exam: skin normal and skin intact  No dry skin, no warmth, no callus, no erythema, no maceration, no abnormal color, no pre-ulcer, no ulcer and no callus  Toe Exam: ROM and strength within normal limits  Sensory   Vibration: intact  Proprioception: intact  Monofilament testing: intact    Vascular  Capillary refills: < 3 seconds  The right DP pulse is 2+  The right PT pulse is 2+  Left Foot/Ankle  Left Foot Inspection  Skin Exam: skin normal and skin intact  No dry skin, no warmth, no erythema, no maceration, normal color, no pre-ulcer, no ulcer and no callus  Toe Exam: ROM and strength within normal limits  Sensory   Vibration: intact  Proprioception: intact  Monofilament testing: intact    Vascular  Capillary refills: < 3 seconds  The left DP pulse is 2+  The left PT pulse is 2+  Assign Risk Category  No deformity present  No loss of protective sensation  No weak pulses  Risk: 0      Assessment/Plan:    No problem-specific Assessment & Plan notes found for this encounter  Diagnoses and all orders for this visit:    Annual physical exam    BMI 28 0-28 9,adult    Type 2 diabetes mellitus without complication, without long-term current use of insulin (HCC)  -     pioglitazone (ACTOS) 30 mg tablet; Take 1 tablet (30 mg total) by mouth daily  -     atorvastatin (LIPITOR) 10 mg tablet; Take 1 tablet (10 mg total) by mouth daily  -     lisinopril (ZESTRIL) 10 mg tablet; Take 1 tablet (10 mg total) by mouth daily  -     Cancel: Albumin / creatinine urine ratio; Future  -     Cancel: Albumin / creatinine urine ratio  -     Hemoglobin A1C; Future  -     Basic metabolic panel; Future  -     Hemoglobin A1C  -     Basic metabolic panel  -     Cancel: Albumin / creatinine urine ratio;  Future  -     Albumin / creatinine urine ratio    Mild intermittent asthma without complication    Anxiety    Cigarette nicotine dependence without complication    Hyperlipidemia associated with type 2 diabetes mellitus ([facility] )    Encounter for prostate cancer screening  -     PSA Total (Reflex To Free); Future  -     PSA Total (Reflex To Free)          Medical management-continue current medications  Give trial of metformin 1000 mg in the a m  and 500 mg with your evening meal   I will send out the urine for protein  I would repeat your BMP and A1c in 3 months  I will add a PSA at the same time  Continue to work on diet, stay active, and lose a few more pounds  You should have a dental exam and stay current on your eye exams

## 2023-05-16 LAB
ALBUMIN/CREAT UR: 15 MG/G CREAT (ref 0–29)
CREAT UR-MCNC: 108.5 MG/DL
MICROALBUMIN UR-MCNC: 16.3 UG/ML

## 2023-05-24 DIAGNOSIS — F41.1 GENERALIZED ANXIETY DISORDER: ICD-10-CM

## 2023-05-25 RX ORDER — CLONAZEPAM 2 MG/1
TABLET ORAL
Qty: 60 TABLET | Refills: 0 | Status: SHIPPED | OUTPATIENT
Start: 2023-05-25

## 2023-07-07 LAB
LEFT EYE DIABETIC RETINOPATHY: NORMAL
LEFT EYE DIABETIC RETINOPATHY: NORMAL
RIGHT EYE DIABETIC RETINOPATHY: NORMAL
RIGHT EYE DIABETIC RETINOPATHY: NORMAL
SEVERITY (EYE EXAM): NORMAL
SEVERITY (EYE EXAM): NORMAL

## 2023-08-11 ENCOUNTER — TELEPHONE (OUTPATIENT)
Dept: ADMINISTRATIVE | Facility: OTHER | Age: 53
End: 2023-08-11

## 2023-08-11 ENCOUNTER — OFFICE VISIT (OUTPATIENT)
Dept: FAMILY MEDICINE CLINIC | Facility: CLINIC | Age: 53
End: 2023-08-11
Payer: COMMERCIAL

## 2023-08-11 VITALS
OXYGEN SATURATION: 97 % | RESPIRATION RATE: 18 BRPM | HEIGHT: 69 IN | SYSTOLIC BLOOD PRESSURE: 142 MMHG | HEART RATE: 103 BPM | BODY MASS INDEX: 28.73 KG/M2 | DIASTOLIC BLOOD PRESSURE: 98 MMHG | WEIGHT: 194 LBS | TEMPERATURE: 98.9 F

## 2023-08-11 DIAGNOSIS — H61.22 IMPACTED CERUMEN OF LEFT EAR: ICD-10-CM

## 2023-08-11 DIAGNOSIS — E78.5 HYPERLIPIDEMIA ASSOCIATED WITH TYPE 2 DIABETES MELLITUS (HCC): ICD-10-CM

## 2023-08-11 DIAGNOSIS — E11.69 HYPERLIPIDEMIA ASSOCIATED WITH TYPE 2 DIABETES MELLITUS (HCC): ICD-10-CM

## 2023-08-11 DIAGNOSIS — H60.332 ACUTE SWIMMER'S EAR OF LEFT SIDE: Primary | ICD-10-CM

## 2023-08-11 PROCEDURE — 69209 REMOVE IMPACTED EAR WAX UNI: CPT | Performed by: FAMILY MEDICINE

## 2023-08-11 PROCEDURE — 99213 OFFICE O/P EST LOW 20 MIN: CPT | Performed by: FAMILY MEDICINE

## 2023-08-11 RX ORDER — CIPROFLOXACIN AND DEXAMETHASONE 3; 1 MG/ML; MG/ML
4 SUSPENSION/ DROPS AURICULAR (OTIC) 2 TIMES DAILY
Qty: 7.5 ML | Refills: 0 | Status: SHIPPED | OUTPATIENT
Start: 2023-08-11

## 2023-08-11 NOTE — LETTER
.  Diabetic Eye Exam Form    Date Requested: 23  Patient: Tj Owen  Patient : 1970   Referring Provider: Boby Rayo MD      DIABETIC Eye Exam Date _______________________________      Type of Exam MUST be documented for Diabetic Eye Exams. Please CHECK ONE. Retinal Exam       Dilated Retinal Exam       OCT       Optomap-Iris Exam      Fundus Photography       Left Eye - Please check Retinopathy or No Retinopathy        Exam did show retinopathy    Exam did not show retinopathy       Right Eye - Please check Retinopathy or No Retinopathy       Exam did show retinopathy    Exam did not show retinopathy       Comments __________________________________________________________    Practice Providing Exam ______________________________________________    Exam Performed By (print name) _______________________________________      Provider Signature ___________________________________________________      These reports are needed for  compliance. Please fax this completed form and a copy of the Diabetic Eye Exam report to our office located at [address] [place] [address] as soon as possible via Fax [phone] attention Kaur: Phone [phone]  We thank you for your assistance in treating our mutual patient.

## 2023-08-11 NOTE — TELEPHONE ENCOUNTER
Upon review of the In Basket request and the patient's chart, initial outreach has been made via fax to facility. Please see Contacts section for details.      Thank you  Eugenia Hennessy MA

## 2023-08-11 NOTE — PROGRESS NOTES
[facility]        NAME: Sona Hampton is a 46 y.o. male  : 1970    MRN: [id number]  DATE: 2023  TIME: 10:00 AM    Assessment and Plan   Acute swimmer's ear of left side [H60.332]  1. Acute swimmer's ear of left side  ciprofloxacin-dexamethasone (CIPRODEX) otic suspension      2. Impacted cerumen of left ear        3. Hyperlipidemia associated with type 2 diabetes mellitus (HCC)  Lipid Panel with Direct LDL reflex    Lipid Panel with Direct LDL reflex          No problem-specific Assessment & Plan notes found for this encounter. Patient Instructions     Patient Instructions   Use drops twice daily for 4 to 5 days. Get labs as previously ordered and follow-up in September. Chief Complaint     Chief Complaint   Patient presents with   • Earache     Left ear pain for about 1 month, can hear fluid         History of Present Illness       Patient comes in today with blocked sensation in the left ear with mild discomfort. No drainage reported. Review of Systems   Review of Systems   Constitutional: Negative for appetite change, chills, diaphoresis and fever. HENT: Positive for ear pain and hearing loss. Negative for congestion, rhinorrhea, sinus pressure and sore throat. Eyes: Negative for discharge, redness and itching. Respiratory: Negative for cough, shortness of breath and wheezing. Cardiovascular: Negative for chest pain and palpitations. Rapid or slow heart rate   Gastrointestinal: Negative for diarrhea, nausea and vomiting.          Current Medications       Current Outpatient Medications:   •  atorvastatin (LIPITOR) 10 mg tablet, Take 1 tablet (10 mg total) by mouth daily, Disp: 90 tablet, Rfl: 1  •  ciprofloxacin-dexamethasone (CIPRODEX) otic suspension, Administer 4 drops into the left ear 2 (two) times a day, Disp: 7.5 mL, Rfl: 0  •  clonazePAM (KlonoPIN) 2 mg tablet, Take 1 tablet by mouth twice daily, Disp: 60 tablet, Rfl: 1  •  lisinopril (ZESTRIL) 10 mg tablet, Take 1 tablet (10 mg total) by mouth daily, Disp: 90 tablet, Rfl: 1  •  melatonin 3 mg, Take 3-6mg by mouth daily at bedtime as needed for insomnia, Disp: 1 tablet, Rfl: 0  •  metFORMIN (GLUCOPHAGE) 1000 MG tablet, Take 1 tablet (1,000 mg total) by mouth 2 (two) times a day with meals, Disp: 60 tablet, Rfl: 5  •  pioglitazone (ACTOS) 30 mg tablet, Take 1 tablet (30 mg total) by mouth daily, Disp: 90 tablet, Rfl: 1    Current Allergies     Allergies as of 08/11/2023 - Reviewed 08/11/2023   Allergen Reaction Noted   • Erythromycin Diarrhea and Abdominal Pain 12/02/2016            The following portions of the patient's history were reviewed and updated as appropriate: allergies, current medications, past family history, past medical history, past social history, past surgical history and problem list.     Past Medical History:   Diagnosis Date   • Anxiety    • Asthma    • Depression    • Diabetes mellitus ([address])    • Diverticulitis    • Hyperlipemia     RESOLVED [id number]   • Medial meniscus tear     LAST ASSESSED [id number]   • Moderate single current episode of major depressive disorder ([address]) 03/11/2019   • Psychiatric disorder    • Psychiatric illness    • Suicide attempt [facility])        Past Surgical History:   Procedure Laterality Date   • ARTHROSCOPY KNEE Right     ONSET 7/3/2014   • LA LAPAROSCOPY COLECTOMY PARTIAL W/ANASTOMOSIS N/A 7/9/2019    Procedure: RESECTION COLON SIGMOID LAPAROSCOPIC WITH ANASTOMOSIS: INTRAOP COLONOSCOPY;  Surgeon: Cong Piper MD;  Location: [facility];  Service: General   • TONSILLECTOMY AND ADENOIDECTOMY     • TOOTH EXTRACTION      WISDOM TEETH       Family History   Problem Relation Age of Onset   • Diabetes Mother    • Hypertension Mother    • Prostate cancer Father    • Substance Abuse Neg Hx    • Mental illness Neg Hx          Medications have been verified.         Objective   /98 (BP Location: Left arm, Patient Position: Sitting, Cuff Size: Adult)   Pulse 103   Temp 98.9 °F (37.2 °C) (Tympanic)   Resp 18   Ht 5' 9" (1.753 m)   Wt 88 kg (194 lb)   SpO2 97%   BMI 28.65 kg/m²          Physical Exam     Physical Exam  Vitals reviewed. Constitutional:       General: He is not in acute distress. Appearance: He is well-developed. He is not ill-appearing. HENT:      Head: Normocephalic and atraumatic. Right Ear: Tympanic membrane and external ear normal. No drainage. Left Ear: Decreased hearing noted. No drainage or tenderness. There is impacted cerumen. Tympanic membrane is injected. Ears:      Comments: Post irrigation canals slightly swollen. Some injection of the tympanic membrane. Nose: No congestion. Mouth/Throat:      Pharynx: No oropharyngeal exudate or posterior oropharyngeal erythema. Eyes:      General:         Right eye: No discharge. Left eye: No discharge. Extraocular Movements: Extraocular movements intact. Conjunctiva/sclera: Conjunctivae normal.      Pupils: Pupils are equal, round, and reactive to light. Neck:      Thyroid: No thyromegaly. Cardiovascular:      Rate and Rhythm: Normal rate and regular rhythm. Heart sounds: Normal heart sounds. Pulmonary:      Effort: Pulmonary effort is normal. No respiratory distress. Breath sounds: No wheezing or rales. Musculoskeletal:      Cervical back: Normal range of motion and neck supple. Lymphadenopathy:      Cervical: No cervical adenopathy. Skin:     Findings: No rash. Neurological:      Mental Status: He is alert. Ear cerumen removal    Date/Time: 8/11/2023 9:30 AM    Performed by: Steffi Moura MD  Authorized by: Steffi Moura MD  Universal Protocol:  Consent: Verbal consent obtained.   Patient understanding: patient states understanding of the procedure being performed      Patient location:  Clinic  Procedure details:     Location:  L ear    Procedure type: irrigation only

## 2023-08-11 NOTE — TELEPHONE ENCOUNTER
----- Message from Len Pichardo sent at 8/11/2023  9:38 AM EDT -----  Regarding: Quality Outreach  08/11/23 9:38 AM    Hello, our patient Haley Olivera has had Diabetic Eye Exam completed/performed. Please assist in updating the patient chart by making an External outreach to [address] [place] [address] located in [place], [place]. The date of service is 07/07/2023.     Thank you,  Marimar aJrrett PG [facility]

## 2023-08-14 ENCOUNTER — TELEPHONE (OUTPATIENT)
Dept: ADMINISTRATIVE | Facility: OTHER | Age: 53
End: 2023-08-14

## 2023-08-14 NOTE — LETTER
Diabetic Eye Exam Form    Date Requested: 08/15/23  Patient: Lashawn Sepulveda  Patient : 1970   Referring Provider: Yung Light MD      DIABETIC Eye Exam Date _______________________________      Type of Exam MUST be documented for Diabetic Eye Exams. Please CHECK ONE. Retinal Exam       Dilated Retinal Exam       OCT       Optomap-Iris Exam      Fundus Photography       Left Eye - Please check Retinopathy or No Retinopathy        Exam did show retinopathy    Exam did not show retinopathy       Right Eye - Please check Retinopathy or No Retinopathy       Exam did show retinopathy    Exam did not show retinopathy       Comments __________________________________________________________    Practice Providing Exam ______________________________________________    Exam Performed By (print name) _______________________________________      Provider Signature ___________________________________________________      These reports are needed for  compliance. Please fax this completed form and a copy of the Diabetic Eye Exam report to our office located at [address] [place] [address] as soon as possible via Fax [phone] attention Kaur: Phone [phone]  We thank you for your assistance in treating our mutual patient.

## 2023-08-14 NOTE — TELEPHONE ENCOUNTER
----- Message from Blossom Venturaelizabeth sent at 8/11/2023  9:38 AM EDT -----  Regarding: Quality Outreach  08/11/23 9:38 AM    Hello, our patient Oscar Gonzalez has had Diabetic Eye Exam completed/performed. Please assist in updating the patient chart by making an External outreach to [address] [place] [address] located in [place], [place]. The date of service is 07/07/2023.     Thank you,  Marimar Jarrett PG [facility]

## 2023-08-14 NOTE — TELEPHONE ENCOUNTER
Upon review of the In Basket request we were able to locate, review, and update the patient chart as requested for Diabetic Eye Exam.    Any additional questions or concerns should be emailed to the Practice Liaisons via the appropriate education email address, please do not reply via In Basket.     Thank you  Purvi Portillo MA

## 2023-08-15 NOTE — TELEPHONE ENCOUNTER
Upon review of the In Basket request and the patient's chart, initial outreach has been made via fax to facility. Please see Contacts section for details.      Thank you  Purvi Portillo MA

## 2023-08-18 NOTE — TELEPHONE ENCOUNTER
Upon review of the In Basket request we were able to locate, review, and update the patient chart as requested for Diabetic Eye Exam.    Any additional questions or concerns should be emailed to the Practice Liaisons via the appropriate education email address, please do not reply via In Basket.     Thank you  Aurora Alcantara MA

## 2023-09-11 DIAGNOSIS — F41.1 GENERALIZED ANXIETY DISORDER: ICD-10-CM

## 2023-09-11 RX ORDER — CLONAZEPAM 2 MG/1
TABLET ORAL
Qty: 60 TABLET | Refills: 0 | Status: SHIPPED | OUTPATIENT
Start: 2023-09-11 | End: 2023-09-22

## 2023-09-11 RX ORDER — CLONAZEPAM 2 MG/1
2 TABLET ORAL 2 TIMES DAILY
Qty: 60 TABLET | Refills: 0 | Status: CANCELLED | OUTPATIENT
Start: 2023-09-11

## 2023-09-20 DIAGNOSIS — F41.1 GENERALIZED ANXIETY DISORDER: ICD-10-CM

## 2023-09-22 RX ORDER — CLONAZEPAM 2 MG/1
TABLET ORAL
Qty: 60 TABLET | Refills: 0 | Status: SHIPPED | OUTPATIENT
Start: 2023-09-22

## 2023-09-23 LAB
CHOLEST SERPL-MCNC: 167 MG/DL (ref 100–199)
HDLC SERPL-MCNC: 72 MG/DL
LDLC SERPL CALC-MCNC: 70 MG/DL (ref 0–99)
LDLC/HDLC SERPL: 1 RATIO (ref 0–3.6)
SL AMB VLDL CHOLESTEROL CALC: 25 MG/DL (ref 5–40)
TRIGL SERPL-MCNC: 148 MG/DL (ref 0–149)

## 2023-09-25 ENCOUNTER — OFFICE VISIT (OUTPATIENT)
Dept: FAMILY MEDICINE CLINIC | Facility: CLINIC | Age: 53
End: 2023-09-25
Payer: COMMERCIAL

## 2023-09-25 VITALS
OXYGEN SATURATION: 98 % | TEMPERATURE: 98.9 F | RESPIRATION RATE: 18 BRPM | HEART RATE: 97 BPM | WEIGHT: 193 LBS | DIASTOLIC BLOOD PRESSURE: 86 MMHG | SYSTOLIC BLOOD PRESSURE: 120 MMHG | BODY MASS INDEX: 28.58 KG/M2 | HEIGHT: 69 IN

## 2023-09-25 DIAGNOSIS — F41.1 GENERALIZED ANXIETY DISORDER: ICD-10-CM

## 2023-09-25 DIAGNOSIS — E11.9 TYPE 2 DIABETES MELLITUS WITHOUT COMPLICATION, WITHOUT LONG-TERM CURRENT USE OF INSULIN (HCC): ICD-10-CM

## 2023-09-25 DIAGNOSIS — E11.69 HYPERLIPIDEMIA ASSOCIATED WITH TYPE 2 DIABETES MELLITUS: Primary | ICD-10-CM

## 2023-09-25 DIAGNOSIS — Z12.5 ENCOUNTER FOR PROSTATE CANCER SCREENING: ICD-10-CM

## 2023-09-25 DIAGNOSIS — E78.5 HYPERLIPIDEMIA ASSOCIATED WITH TYPE 2 DIABETES MELLITUS: Primary | ICD-10-CM

## 2023-09-25 LAB — SL AMB POCT HEMOGLOBIN AIC: 7.3 (ref ?–6.5)

## 2023-09-25 PROCEDURE — 83036 HEMOGLOBIN GLYCOSYLATED A1C: CPT | Performed by: FAMILY MEDICINE

## 2023-09-25 PROCEDURE — 99214 OFFICE O/P EST MOD 30 MIN: CPT | Performed by: FAMILY MEDICINE

## 2023-09-25 RX ORDER — METFORMIN HYDROCHLORIDE 750 MG/1
1500 TABLET, EXTENDED RELEASE ORAL
Qty: 60 TABLET | Refills: 5 | Status: SHIPPED | OUTPATIENT
Start: 2023-09-25 | End: 2024-03-23

## 2023-09-25 NOTE — PROGRESS NOTES
[facility]        NAME: Oc Li is a 46 y.o. male  : 1970    MRN: [id number]  DATE: 2023  TIME: 6:17 PM    Assessment and Plan   Hyperlipidemia associated with type 2 diabetes mellitus  [E11.69, E78.5]  1. Hyperlipidemia associated with type 2 diabetes mellitus ([address])        2. Type 2 diabetes mellitus without complication, without long-term current use of insulin (HCC)  POCT hemoglobin A1c    metFORMIN (GLUCOPHAGE-XR) 750 mg 24 hr tablet    Basic metabolic panel    HEMOGLOBIN A1C W/ EAG ESTIMATION    Basic metabolic panel    HEMOGLOBIN A1C W/ EAG ESTIMATION      3. Generalized anxiety disorder        4. Encounter for prostate cancer screening  PSA Total (Reflex To Free)    PSA Total (Reflex To Free)          No problem-specific Assessment & Plan notes found for this encounter. Patient Instructions     Patient Instructions   Continue current medications. A1c in the office today is 7.3%  Trial of decreasing metformin to 750 mg XR 2 tablets daily. Continue to work on diet. Monitor blood pressure. Repeat BMP, A1c, along with PSA in 3 to 4 months. Office visit after. Chief Complaint     Chief Complaint   Patient presents with   • Follow-up     ER Follow up         History of Present Illness       Medical [id number] labs of cholesterol showed marked improvement. Continues on atorvastatin 10 mg. Diabetes-fasting sugars at home have been in the 140 range. Anxiety-stable with current clonazepam.  Hyperlipidemia-good control with current medication. lab did not draw all labs. Review of Systems   Review of Systems   Constitutional: Negative for appetite change, chills, diaphoresis and fever. HENT: Negative for congestion, ear pain, rhinorrhea, sinus pressure and sore throat. Eyes: Negative for discharge, redness and itching. Respiratory: Negative for cough, shortness of breath and wheezing.     Cardiovascular: Negative for chest pain and palpitations. Rapid or slow heart rate   Gastrointestinal: Negative for diarrhea, nausea and vomiting.          Current Medications       Current Outpatient Medications:   •  atorvastatin (LIPITOR) 10 mg tablet, Take 1 tablet (10 mg total) by mouth daily, Disp: 90 tablet, Rfl: 1  •  clonazePAM (KlonoPIN) 2 mg tablet, Take 1 tablet by mouth twice daily, Disp: 60 tablet, Rfl: 0  •  lisinopril (ZESTRIL) 10 mg tablet, Take 1 tablet (10 mg total) by mouth daily, Disp: 90 tablet, Rfl: 1  •  melatonin 3 mg, Take 3-6mg by mouth daily at bedtime as needed for insomnia, Disp: 1 tablet, Rfl: 0  •  metFORMIN (GLUCOPHAGE-XR) 750 mg 24 hr tablet, Take 2 tablets (1,500 mg total) by mouth daily with breakfast, Disp: 60 tablet, Rfl: 5  •  pioglitazone (ACTOS) 30 mg tablet, Take 1 tablet (30 mg total) by mouth daily, Disp: 90 tablet, Rfl: 1    Current Allergies     Allergies as of 09/25/2023 - Reviewed 09/25/2023   Allergen Reaction Noted   • Erythromycin Diarrhea and Abdominal Pain 12/02/2016            The following portions of the patient's history were reviewed and updated as appropriate: allergies, current medications, past family history, past medical history, past social history, past surgical history and problem list.     Past Medical History:   Diagnosis Date   • Anxiety    • Asthma    • Depression    • Diabetes mellitus ([address])    • Diverticulitis    • Hyperlipemia     RESOLVED [id number]   • Medial meniscus tear     LAST ASSESSED [id number]   • Moderate single current episode of major depressive disorder ([address]) 03/11/2019   • Psychiatric disorder    • Psychiatric illness    • Suicide attempt [facility])        Past Surgical History:   Procedure Laterality Date   • ARTHROSCOPY KNEE Right     ONSET 7/3/2014   • [place] LAPAROSCOPY COLECTOMY PARTIAL W/ANASTOMOSIS N/A 7/9/2019    Procedure: RESECTION COLON SIGMOID LAPAROSCOPIC WITH ANASTOMOSIS: INTRAOP COLONOSCOPY;  Surgeon: Landon Pollard MD;  Location:  MAIN OR; Service: General   • TONSILLECTOMY AND ADENOIDECTOMY     • TOOTH EXTRACTION      WISDOM TEETH       Family History   Problem Relation Age of Onset   • Diabetes Mother    • Hypertension Mother    • Prostate cancer Father    • Substance Abuse Neg Hx    • Mental illness Neg Hx          Medications have been verified. Objective   /86   Pulse 97   Temp 98.9 °F (37.2 °C) (Tympanic)   Resp 18   Ht 5' 9" (1.753 m)   Wt 87.5 kg (193 lb)   SpO2 98%   BMI 28.50 kg/m²        Physical Exam     Physical Exam  Vitals reviewed. Constitutional:       General: He is not in acute distress. Appearance: He is well-developed. He is not diaphoretic. HENT:      Head: Normocephalic and atraumatic. Right Ear: Tympanic membrane, ear canal and external ear normal.      Left Ear: Tympanic membrane, ear canal and external ear normal.      Nose: Nose normal. No mucosal edema or rhinorrhea. Right Sinus: No maxillary sinus tenderness. Left Sinus: No maxillary sinus tenderness. Mouth/Throat:      Mouth: Mucous membranes are not pale and not dry. Dentition: Normal dentition. Pharynx: Uvula midline. No oropharyngeal exudate. Eyes:      General:         Right eye: No discharge. Left eye: No discharge. Extraocular Movements: Extraocular movements intact. Conjunctiva/sclera: Conjunctivae normal.      Pupils: Pupils are equal, round, and reactive to light. Neck:      Thyroid: No thyromegaly. Cardiovascular:      Rate and Rhythm: Normal rate and regular rhythm. Heart sounds: Normal heart sounds. No murmur heard. Pulmonary:      Effort: Pulmonary effort is normal. No respiratory distress. Breath sounds: Normal breath sounds. No wheezing or rales. Musculoskeletal:      Cervical back: Normal range of motion and neck supple. Right lower leg: No edema. Left lower leg: No edema. Lymphadenopathy:      Cervical: No cervical adenopathy.    Neurological: Mental Status: He is alert.    Psychiatric:         Mood and Affect: Mood normal.         Behavior: Behavior normal.

## 2023-09-25 NOTE — PATIENT INSTRUCTIONS
Continue current medications. A1c in the office today is 7.3%  Trial of decreasing metformin to 750 mg XR 2 tablets daily. Continue to work on diet. Monitor blood pressure. Repeat BMP, A1c, along with PSA in 3 to 4 months. Office visit after.

## 2023-11-20 DIAGNOSIS — F41.1 GENERALIZED ANXIETY DISORDER: ICD-10-CM

## 2023-11-20 RX ORDER — CLONAZEPAM 2 MG/1
TABLET ORAL
Qty: 60 TABLET | Refills: 5 | Status: SHIPPED | OUTPATIENT
Start: 2023-11-20

## 2024-01-18 DIAGNOSIS — E11.9 TYPE 2 DIABETES MELLITUS WITHOUT COMPLICATION, WITHOUT LONG-TERM CURRENT USE OF INSULIN (HCC): ICD-10-CM

## 2024-01-19 RX ORDER — ATORVASTATIN CALCIUM 10 MG/1
10 TABLET, FILM COATED ORAL DAILY
Qty: 90 TABLET | Refills: 0 | Status: SHIPPED | OUTPATIENT
Start: 2024-01-19

## 2024-01-19 RX ORDER — PIOGLITAZONEHYDROCHLORIDE 30 MG/1
30 TABLET ORAL DAILY
Qty: 90 TABLET | Refills: 0 | Status: SHIPPED | OUTPATIENT
Start: 2024-01-19 | End: 2024-01-24

## 2024-01-19 RX ORDER — LISINOPRIL 10 MG/1
10 TABLET ORAL DAILY
Qty: 90 TABLET | Refills: 0 | Status: SHIPPED | OUTPATIENT
Start: 2024-01-19

## 2024-01-21 LAB
BUN SERPL-MCNC: 10 MG/DL (ref 6–24)
BUN/CREAT SERPL: 11 (ref 9–20)
CALCIUM SERPL-MCNC: 9.3 MG/DL (ref 8.7–10.2)
CHLORIDE SERPL-SCNC: 103 MMOL/L (ref 96–106)
CO2 SERPL-SCNC: 24 MMOL/L (ref 20–29)
CREAT SERPL-MCNC: 0.87 MG/DL (ref 0.76–1.27)
EGFR: 103 ML/MIN/1.73
EST. AVERAGE GLUCOSE BLD GHB EST-MCNC: 203 MG/DL
GLUCOSE SERPL-MCNC: 185 MG/DL (ref 70–99)
HBA1C MFR BLD: 8.7 % (ref 4.8–5.6)
POTASSIUM SERPL-SCNC: 4.6 MMOL/L (ref 3.5–5.2)
PSA SERPL-MCNC: 0.9 NG/ML (ref 0–4)
SL AMB REFLEX CRITERIA: NORMAL
SODIUM SERPL-SCNC: 141 MMOL/L (ref 134–144)

## 2024-01-24 ENCOUNTER — OFFICE VISIT (OUTPATIENT)
Dept: FAMILY MEDICINE CLINIC | Facility: CLINIC | Age: 54
End: 2024-01-24
Payer: COMMERCIAL

## 2024-01-24 VITALS — SYSTOLIC BLOOD PRESSURE: 128 MMHG | DIASTOLIC BLOOD PRESSURE: 76 MMHG

## 2024-01-24 DIAGNOSIS — E11.69 HYPERLIPIDEMIA ASSOCIATED WITH TYPE 2 DIABETES MELLITUS: ICD-10-CM

## 2024-01-24 DIAGNOSIS — E11.9 TYPE 2 DIABETES MELLITUS WITHOUT COMPLICATION, WITHOUT LONG-TERM CURRENT USE OF INSULIN (HCC): Primary | ICD-10-CM

## 2024-01-24 DIAGNOSIS — E78.5 HYPERLIPIDEMIA ASSOCIATED WITH TYPE 2 DIABETES MELLITUS: ICD-10-CM

## 2024-01-24 PROCEDURE — 99214 OFFICE O/P EST MOD 30 MIN: CPT | Performed by: FAMILY MEDICINE

## 2024-01-24 RX ORDER — METFORMIN HYDROCHLORIDE 500 MG/1
500 TABLET, EXTENDED RELEASE ORAL 2 TIMES DAILY WITH MEALS
Qty: 180 TABLET | Refills: 3 | Status: SHIPPED | OUTPATIENT
Start: 2024-01-24

## 2024-01-24 RX ORDER — GLIPIZIDE 5 MG/1
5 TABLET ORAL
Qty: 90 TABLET | Refills: 3 | Status: SHIPPED | OUTPATIENT
Start: 2024-01-24

## 2024-01-24 NOTE — PROGRESS NOTES
Assessment/Plan:    Type 2 diabetes mellitus without complication, without long-term current use of insulin (HCC)  Stop charlie---start Jardiance---add glypizide PRN  Lab Results   Component Value Date    HGBA1C 8.7 (H) 01/20/2024        Diagnoses and all orders for this visit:    Type 2 diabetes mellitus without complication, without long-term current use of insulin (HCC)  -     Albumin / creatinine urine ratio; Future  -     Basic metabolic panel; Future  -     Hemoglobin A1C; Future  -     Albumin / creatinine urine ratio  -     Basic metabolic panel  -     Hemoglobin A1C  -     metFORMIN (GLUCOPHAGE-XR) 500 mg 24 hr tablet; Take 1 tablet (500 mg total) by mouth 2 (two) times a day with meals  -     Empagliflozin (JARDIANCE) 10 MG TABS tablet; Take 1 tablet (10 mg total) by mouth daily  -     glipiZIDE (GLUCOTROL) 5 mg tablet; Take 1 tablet (5 mg total) by mouth daily with breakfast    Hyperlipidemia associated with type 2 diabetes mellitus       Patient's shoes and socks removed.    Right Foot/Ankle   Right Foot Inspection  Skin Exam: skin normal and skin intact. No dry skin, no warmth, no callus, no erythema, no maceration, no abnormal color, no pre-ulcer, no ulcer and no callus.     Toe Exam: ROM and strength within normal limits.     Sensory   Vibration: intact  Proprioception: intact  Monofilament testing: intact    Vascular  Capillary refills: < 3 seconds  The right DP pulse is 2+. The right PT pulse is 2+.     Left Foot/Ankle  Left Foot Inspection  Skin Exam: skin normal and skin intact. No dry skin, no warmth, no erythema, no maceration, normal color, no pre-ulcer, no ulcer and no callus.     Toe Exam: ROM and strength within normal limits.     Sensory   Vibration: intact  Proprioception: intact  Monofilament testing: intact    Vascular  Capillary refills: < 3 seconds  The left DP pulse is 2+. The left PT pulse is 2+.     Assign Risk Category  No deformity present  No loss of protective sensation  No weak  pulses  Risk: 0        Subjective:   Chief Complaint   Patient presents with    Follow-up        Patient ID: Arthur Aly is a 53 y.o. male.    HPI    The following portions of the patient's history were reviewed and updated as appropriate: allergies, current medications, past family history, past medical history, past social history, past surgical history and problem list.    Review of Systems   Constitutional:  Negative for fatigue, fever and unexpected weight change.   HENT:  Negative for congestion, sinus pain and sore throat.    Eyes:  Negative for visual disturbance.   Respiratory:  Negative for shortness of breath and wheezing.    Cardiovascular:  Negative for chest pain and palpitations.   Gastrointestinal:  Negative for abdominal pain, nausea and vomiting.   Musculoskeletal: Negative.  Negative for arthralgias and myalgias.   Neurological:  Negative for syncope, weakness and numbness.   Psychiatric/Behavioral: Negative.  Negative for confusion, dysphoric mood and suicidal ideas.          Objective:  Vitals:    01/24/24 1633   BP: 128/76   BP Location: Left arm   Patient Position: Sitting   Cuff Size: Standard      Physical Exam  Constitutional:       Appearance: He is well-developed.   HENT:      Right Ear: Ear canal normal. Tympanic membrane is not injected.      Left Ear: Ear canal normal. Tympanic membrane is not injected.      Nose: Nose normal.   Eyes:      General:         Right eye: No discharge.         Left eye: No discharge.      Conjunctiva/sclera: Conjunctivae normal.      Pupils: Pupils are equal, round, and reactive to light.   Neck:      Thyroid: No thyromegaly.   Cardiovascular:      Rate and Rhythm: Normal rate and regular rhythm.      Pulses: no weak pulses          Dorsalis pedis pulses are 2+ on the right side and 2+ on the left side.        Posterior tibial pulses are 2+ on the right side and 2+ on the left side.      Heart sounds: Normal heart sounds. No murmur heard.  Pulmonary:       Effort: Pulmonary effort is normal. No respiratory distress.      Breath sounds: Normal breath sounds. No wheezing.   Abdominal:      General: Bowel sounds are normal. There is no distension.      Palpations: Abdomen is soft.      Tenderness: There is no abdominal tenderness.   Musculoskeletal:         General: Normal range of motion.      Cervical back: Normal range of motion and neck supple.   Feet:      Right foot:      Skin integrity: No ulcer, skin breakdown, erythema, warmth, callus or dry skin.      Left foot:      Skin integrity: No ulcer, skin breakdown, erythema, warmth, callus or dry skin.   Lymphadenopathy:      Cervical: No cervical adenopathy.   Skin:     General: Skin is warm and dry.   Neurological:      Mental Status: He is alert and oriented to person, place, and time. He is not disoriented.      Sensory: No sensory deficit.      Motor: No weakness.      Coordination: Coordination normal.      Gait: Gait normal.      Deep Tendon Reflexes: Reflexes are normal and symmetric.   Psychiatric:         Speech: Speech normal.         Behavior: Behavior normal.         Thought Content: Thought content normal.         Judgment: Judgment normal.

## 2024-01-24 NOTE — ASSESSMENT & PLAN NOTE
Stop charlie---start Jardiance---add glypizide PRN  Lab Results   Component Value Date    HGBA1C 8.7 (H) 01/20/2024

## 2024-03-21 ENCOUNTER — TELEPHONE (OUTPATIENT)
Dept: FAMILY MEDICINE CLINIC | Facility: CLINIC | Age: 54
End: 2024-03-21

## 2024-03-21 NOTE — TELEPHONE ENCOUNTER
Called pt rescheduled appointment per Dr. Fernandez from 4/24 to 5/1. Pt would like diabetes blood work sent to [facility]

## 2024-04-28 LAB
ALBUMIN/CREAT UR: 235 MG/G CREAT (ref 0–29)
BUN SERPL-MCNC: 11 MG/DL (ref 6–24)
BUN/CREAT SERPL: 13 (ref 9–20)
CALCIUM SERPL-MCNC: 10.2 MG/DL (ref 8.7–10.2)
CHLORIDE SERPL-SCNC: 98 MMOL/L (ref 96–106)
CO2 SERPL-SCNC: 22 MMOL/L (ref 20–29)
CREAT SERPL-MCNC: 0.85 MG/DL (ref 0.76–1.27)
CREAT UR-MCNC: 31 MG/DL
EGFR: 103 ML/MIN/1.73
EST. AVERAGE GLUCOSE BLD GHB EST-MCNC: 186 MG/DL
GLUCOSE SERPL-MCNC: 159 MG/DL (ref 70–99)
HBA1C MFR BLD: 8.1 % (ref 4.8–5.6)
MICROALBUMIN UR-MCNC: 72.8 UG/ML
POTASSIUM SERPL-SCNC: 4.6 MMOL/L (ref 3.5–5.2)
SODIUM SERPL-SCNC: 138 MMOL/L (ref 134–144)

## 2024-04-28 PROCEDURE — 3060F POS MICROALBUMINURIA REV: CPT | Performed by: FAMILY MEDICINE

## 2024-04-29 DIAGNOSIS — E11.9 TYPE 2 DIABETES MELLITUS WITHOUT COMPLICATION, WITHOUT LONG-TERM CURRENT USE OF INSULIN (HCC): ICD-10-CM

## 2024-04-29 RX ORDER — GLIPIZIDE 5 MG/1
5 TABLET ORAL
Qty: 180 TABLET | Refills: 3 | Status: SHIPPED | OUTPATIENT
Start: 2024-04-29

## 2024-05-01 ENCOUNTER — OFFICE VISIT (OUTPATIENT)
Dept: FAMILY MEDICINE CLINIC | Facility: CLINIC | Age: 54
End: 2024-05-01
Payer: COMMERCIAL

## 2024-05-01 VITALS
TEMPERATURE: 97.1 F | OXYGEN SATURATION: 99 % | DIASTOLIC BLOOD PRESSURE: 78 MMHG | BODY MASS INDEX: 26.73 KG/M2 | WEIGHT: 181 LBS | SYSTOLIC BLOOD PRESSURE: 128 MMHG

## 2024-05-01 DIAGNOSIS — Z12.11 SCREEN FOR COLON CANCER: Primary | ICD-10-CM

## 2024-05-01 DIAGNOSIS — L20.82 FLEXURAL ECZEMA: ICD-10-CM

## 2024-05-01 DIAGNOSIS — E11.9 TYPE 2 DIABETES MELLITUS WITHOUT COMPLICATION, WITHOUT LONG-TERM CURRENT USE OF INSULIN (HCC): ICD-10-CM

## 2024-05-01 PROCEDURE — 99214 OFFICE O/P EST MOD 30 MIN: CPT | Performed by: FAMILY MEDICINE

## 2024-05-01 PROCEDURE — 4010F ACE/ARB THERAPY RXD/TAKEN: CPT | Performed by: FAMILY MEDICINE

## 2024-05-01 PROCEDURE — 3725F SCREEN DEPRESSION PERFORMED: CPT | Performed by: FAMILY MEDICINE

## 2024-05-01 RX ORDER — LISINOPRIL 20 MG/1
20 TABLET ORAL DAILY
Qty: 90 TABLET | Refills: 3 | Status: SHIPPED | OUTPATIENT
Start: 2024-05-01

## 2024-05-01 RX ORDER — TRIAMCINOLONE ACETONIDE 5 MG/G
OINTMENT TOPICAL 2 TIMES DAILY
Qty: 30 G | Refills: 3 | Status: SHIPPED | OUTPATIENT
Start: 2024-05-01

## 2024-05-01 NOTE — PROGRESS NOTES
Assessment/Plan:    No problem-specific Assessment & Plan notes found for this encounter.       Diagnoses and all orders for this visit:    Screen for colon cancer  -     Ambulatory Referral to Gastroenterology; Future    Type 2 diabetes mellitus without complication, without long-term current use of insulin (HCC)  -     lisinopril (ZESTRIL) 20 mg tablet; Take 1 tablet (20 mg total) by mouth daily  -     Empagliflozin 25 MG TABS; Take 1 tablet (25 mg total) by mouth daily  -     Hemoglobin A1c (w/out EAG); Future  -     Comprehensive metabolic panel; Future  -     Hemoglobin A1c (w/out EAG)  -     Comprehensive metabolic panel  -     Albumin / creatinine urine ratio; Future  -     Basic metabolic panel; Future  -     Hemoglobin A1C; Future  -     Albumin / creatinine urine ratio  -     Basic metabolic panel  -     Hemoglobin A1C    Flexural eczema  -     triamcinolone (KENALOG) 0.5 % ointment; Apply topically 2 (two) times a day          Subjective:   Chief Complaint   Patient presents with    Diabetes        Patient ID: Arthur Aly is a 53 y.o. male.    Diabetes  Pertinent negatives for hypoglycemia include no confusion. Pertinent negatives for diabetes include no chest pain, no fatigue and no weakness.       The following portions of the patient's history were reviewed and updated as appropriate: allergies, current medications, past family history, past medical history, past social history, past surgical history and problem list.    Review of Systems   Constitutional:  Negative for fatigue, fever and unexpected weight change.   HENT:  Negative for congestion, sinus pain and sore throat.    Eyes:  Negative for visual disturbance.   Respiratory:  Negative for shortness of breath and wheezing.    Cardiovascular:  Negative for chest pain and palpitations.   Gastrointestinal:  Negative for abdominal pain, nausea and vomiting.   Musculoskeletal: Negative.  Negative for arthralgias and myalgias.   Neurological:   Negative for syncope, weakness and numbness.   Psychiatric/Behavioral: Negative.  Negative for confusion, dysphoric mood and suicidal ideas.          Objective:  Vitals:    05/01/24 1638   BP: 128/78   BP Location: Left arm   Patient Position: Sitting   Cuff Size: Standard   Temp: (!) 97.1 °F (36.2 °C)   TempSrc: Tympanic   SpO2: 99%   Weight: 82.1 kg (181 lb)      Physical Exam  Constitutional:       Appearance: He is well-developed.   HENT:      Right Ear: Ear canal normal. Tympanic membrane is not injected.      Left Ear: Ear canal normal. Tympanic membrane is not injected.      Nose: Nose normal.   Eyes:      General:         Right eye: No discharge.         Left eye: No discharge.      Conjunctiva/sclera: Conjunctivae normal.      Pupils: Pupils are equal, round, and reactive to light.   Neck:      Thyroid: No thyromegaly.   Cardiovascular:      Rate and Rhythm: Normal rate and regular rhythm.      Pulses: no weak pulses.           Dorsalis pedis pulses are 2+ on the right side and 2+ on the left side.        Posterior tibial pulses are 2+ on the right side and 2+ on the left side.      Heart sounds: Normal heart sounds. No murmur heard.  Pulmonary:      Effort: Pulmonary effort is normal. No respiratory distress.      Breath sounds: Normal breath sounds. No wheezing.   Abdominal:      General: Bowel sounds are normal. There is no distension.      Palpations: Abdomen is soft.      Tenderness: There is no abdominal tenderness.   Musculoskeletal:         General: Normal range of motion.      Cervical back: Normal range of motion and neck supple.   Feet:      Right foot:      Skin integrity: No ulcer, skin breakdown, erythema, warmth, callus or dry skin.      Left foot:      Skin integrity: No ulcer, skin breakdown, erythema, warmth, callus or dry skin.   Lymphadenopathy:      Cervical: No cervical adenopathy.   Skin:     General: Skin is warm and dry.   Neurological:      Mental Status: He is alert and oriented  to person, place, and time. He is not disoriented.      Sensory: No sensory deficit.      Motor: No weakness.      Coordination: Coordination normal.      Gait: Gait normal.      Deep Tendon Reflexes: Reflexes are normal and symmetric.   Psychiatric:         Speech: Speech normal.         Behavior: Behavior normal.         Thought Content: Thought content normal.         Judgment: Judgment normal.       Patient's shoes and socks removed.    Right Foot/Ankle   Right Foot Inspection  Skin Exam: skin normal and skin intact. No dry skin, no warmth, no callus, no erythema, no maceration, no abnormal color, no pre-ulcer, no ulcer and no callus.     Toe Exam: ROM and strength within normal limits.     Sensory   Vibration: intact  Proprioception: intact  Monofilament testing: intact    Vascular  Capillary refills: < 3 seconds  The right DP pulse is 2+. The right PT pulse is 2+.     Left Foot/Ankle  Left Foot Inspection  Skin Exam: skin normal and skin intact. No dry skin, no warmth, no erythema, no maceration, normal color, no pre-ulcer, no ulcer and no callus.     Toe Exam: ROM and strength within normal limits.     Sensory   Vibration: intact  Proprioception: intact  Monofilament testing: intact    Vascular  Capillary refills: < 3 seconds  The left DP pulse is 2+. The left PT pulse is 2+.     Assign Risk Category  No deformity present  No loss of protective sensation  No weak pulses  Risk: 0

## 2024-05-24 DIAGNOSIS — F41.1 GENERALIZED ANXIETY DISORDER: ICD-10-CM

## 2024-05-25 RX ORDER — CLONAZEPAM 2 MG/1
TABLET ORAL
Qty: 60 TABLET | Refills: 5 | Status: SHIPPED | OUTPATIENT
Start: 2024-05-25

## 2024-06-17 ENCOUNTER — TELEMEDICINE (OUTPATIENT)
Dept: FAMILY MEDICINE CLINIC | Facility: CLINIC | Age: 54
End: 2024-06-17
Payer: COMMERCIAL

## 2024-06-17 DIAGNOSIS — U07.1 COVID: ICD-10-CM

## 2024-06-17 DIAGNOSIS — Z12.11 SCREEN FOR COLON CANCER: Primary | ICD-10-CM

## 2024-06-17 PROCEDURE — 99213 OFFICE O/P EST LOW 20 MIN: CPT | Performed by: FAMILY MEDICINE

## 2024-06-17 RX ORDER — AZITHROMYCIN 250 MG/1
TABLET, FILM COATED ORAL
Qty: 6 TABLET | Refills: 0 | Status: SHIPPED | OUTPATIENT
Start: 2024-06-17 | End: 2024-06-21

## 2024-06-17 NOTE — ASSESSMENT & PLAN NOTE
Fever/prod cough for 1 wk since Covid---not cleared for work yet--pt will start Vit [id number]/Vit C 1000mg/zinc 25mg/Z pk

## 2024-06-17 NOTE — PROGRESS NOTES
Virtual Regular Visit    Verification of patient location:    Patient is located at Home in the following state in which I hold an active license [place]      Assessment/Plan:    Problem List Items Addressed This Visit       COVID     Fever/prod cough for 1 wk since Covid---not cleared for work yet--pt will start Vit [id number]/Vit C 1000mg/zinc 25mg/Z pk          Other Visit Diagnoses       Screen for colon cancer    -  Primary    Relevant Orders    Ambulatory Referral to Gastroenterology                 Reason for visit is   Chief Complaint   Patient presents with    Virtual Regular Visit    COVID-19     Tested Positive 7/11/2024- still having symptoms headache, cough, low grade fever. Still testing positive.         Encounter provider Migel Fernandez MD      Recent Visits  No visits were found meeting these conditions.  Showing recent visits within past 7 days and meeting all other requirements  Today's Visits  Date Type Provider Dept   06/17/24 Telemedicine Migel Fernandez MD [facility]   Showing today's visits and meeting all other requirements  Future Appointments  No visits were found meeting these conditions.  Showing future appointments within next 150 days and meeting all other requirements       The patient was identified by name and date of birth. Arthur Aly was informed that this is a telemedicine visit and that the visit is being conducted through the Epic Embedded platform. He agrees to proceed..  My office door was closed. No one else was in the room.  He acknowledged consent and understanding of privacy and security of the video platform. The patient has agreed to participate and understands they can discontinue the visit at any time.    Patient is aware this is a billable service.     Subjective  Arthur Aly is a 54 y.o. male w/ covid for 1 wk---still symptomatic      HPI     Past Medical History:   Diagnosis Date    Anxiety     Asthma     Depression     Diabetes mellitus (HCC)      Diverticulitis     Hyperlipemia     RESOLVED [id number]    Medial meniscus tear     LAST ASSESSED [id number]    Moderate single current episode of major depressive disorder (HCC) 03/11/2019    Psychiatric disorder     Psychiatric illness     Suicide attempt (HCC)        Past Surgical History:   Procedure Laterality Date    ARTHROSCOPY KNEE Right     ONSET 7/3/2014    IL LAPAROSCOPY COLECTOMY PARTIAL W/ANASTOMOSIS N/A 7/9/2019    Procedure: RESECTION COLON SIGMOID LAPAROSCOPIC WITH ANASTOMOSIS: INTRAOP COLONOSCOPY;  Surgeon: Malcolm Gutierrez MD;  Location:  MAIN OR;  Service: General    TONSILLECTOMY AND ADENOIDECTOMY      TOOTH EXTRACTION      WISDOM TEETH       Current Outpatient Medications   Medication Sig Dispense Refill    atorvastatin (LIPITOR) 10 mg tablet Take 1 tablet by mouth once daily 90 tablet 0    clonazePAM (KlonoPIN) 2 mg tablet Take 1 tablet by mouth twice daily 60 tablet 5    Empagliflozin 25 MG TABS Take 1 tablet (25 mg total) by mouth daily 90 tablet 3    glipiZIDE (GLUCOTROL) 5 mg tablet Take 1 tablet (5 mg total) by mouth 2 (two) times a day before meals 180 tablet 3    lisinopril (ZESTRIL) 20 mg tablet Take 1 tablet (20 mg total) by mouth daily 90 tablet 3    melatonin 3 mg Take 3-6mg by mouth daily at bedtime as needed for insomnia 1 tablet 0    metFORMIN (GLUCOPHAGE-XR) 500 mg 24 hr tablet Take 1 tablet (500 mg total) by mouth 2 (two) times a day with meals 180 tablet 3    triamcinolone (KENALOG) 0.5 % ointment Apply topically 2 (two) times a day 30 g 3     No current facility-administered medications for this visit.        Allergies   Allergen Reactions    Erythromycin Diarrhea and Abdominal Pain       Review of Systems   Constitutional:  Positive for fever. Negative for fatigue and unexpected weight change.   HENT:  Negative for congestion, sinus pain and sore throat.    Eyes:  Negative for visual disturbance.   Respiratory:  Positive for cough. Negative for shortness of breath and  wheezing.    Cardiovascular:  Negative for chest pain and palpitations.   Gastrointestinal:  Negative for abdominal pain, nausea and vomiting.   Musculoskeletal: Negative.  Negative for arthralgias and myalgias.   Neurological:  Negative for syncope, weakness and numbness.   Psychiatric/Behavioral: Negative.  Negative for confusion, dysphoric mood and suicidal ideas.        Video Exam    There were no vitals filed for this visit.    Physical Exam  Pulmonary:      Effort: Pulmonary effort is normal.   Neurological:      General: No focal deficit present.          Visit Time  Total Visit Duration: 15

## 2024-06-19 ENCOUNTER — TELEPHONE (OUTPATIENT)
Dept: FAMILY MEDICINE CLINIC | Facility: CLINIC | Age: 54
End: 2024-06-19

## 2024-06-19 NOTE — TELEPHONE ENCOUNTER
Left vm had to print out letter. Will put in [employer] tonight so it should be on chart. Mentioned he can give us a call as well if he needed it faxed or if there is any issues.

## 2024-06-19 NOTE — TELEPHONE ENCOUNTER
Pt needs a return to work note.     Pt was out of work 6/17-6/19 return to work tomorrow.     Wants on my chart    OK to write ?

## 2024-06-20 NOTE — TELEPHONE ENCOUNTER
Patient had called in stating that he is unable to review his return letter for work on the [employer] and was hoping this letter could be faxed over at     [phone]    Please advise back to patient once the letter has been faxed.

## 2024-07-18 DIAGNOSIS — E11.9 TYPE 2 DIABETES MELLITUS WITHOUT COMPLICATION, WITHOUT LONG-TERM CURRENT USE OF INSULIN (HCC): ICD-10-CM

## 2024-07-18 RX ORDER — ATORVASTATIN CALCIUM 10 MG/1
10 TABLET, FILM COATED ORAL DAILY
Qty: 90 TABLET | Refills: 1 | Status: SHIPPED | OUTPATIENT
Start: 2024-07-18

## 2024-08-16 ENCOUNTER — TELEPHONE (OUTPATIENT)
Dept: FAMILY MEDICINE CLINIC | Facility: CLINIC | Age: 54
End: 2024-08-16

## 2024-08-16 NOTE — TELEPHONE ENCOUNTER
Waned to confirm appointment and insurance for Monday. Also wanted some appointment notes for why pt was coming in.

## 2024-08-18 LAB
ALBUMIN SERPL-MCNC: 4.8 G/DL (ref 3.8–4.9)
ALP SERPL-CCNC: 65 IU/L (ref 44–121)
ALT SERPL-CCNC: 19 IU/L (ref 0–44)
AST SERPL-CCNC: 20 IU/L (ref 0–40)
BILIRUB SERPL-MCNC: 0.8 MG/DL (ref 0–1.2)
BUN SERPL-MCNC: 15 MG/DL (ref 6–24)
BUN/CREAT SERPL: 17 (ref 9–20)
CALCIUM SERPL-MCNC: 9.7 MG/DL (ref 8.7–10.2)
CHLORIDE SERPL-SCNC: 99 MMOL/L (ref 96–106)
CHOLEST SERPL-MCNC: 152 MG/DL (ref 100–199)
CO2 SERPL-SCNC: 23 MMOL/L (ref 20–29)
CREAT SERPL-MCNC: 0.86 MG/DL (ref 0.76–1.27)
EGFR: 102 ML/MIN/1.73
GLOBULIN SER-MCNC: 2.7 G/DL (ref 1.5–4.5)
GLUCOSE SERPL-MCNC: 140 MG/DL (ref 70–99)
HBA1C MFR BLD: 6.7 % (ref 4.8–5.6)
HDLC SERPL-MCNC: 63 MG/DL
LDLC SERPL CALC-MCNC: 72 MG/DL (ref 0–99)
LDLC/HDLC SERPL: 1.1 RATIO (ref 0–3.6)
POTASSIUM SERPL-SCNC: 5 MMOL/L (ref 3.5–5.2)
PROT SERPL-MCNC: 7.5 G/DL (ref 6–8.5)
SL AMB VLDL CHOLESTEROL CALC: 17 MG/DL (ref 5–40)
SODIUM SERPL-SCNC: 138 MMOL/L (ref 134–144)
TRIGL SERPL-MCNC: 92 MG/DL (ref 0–149)

## 2024-08-19 ENCOUNTER — OFFICE VISIT (OUTPATIENT)
Dept: FAMILY MEDICINE CLINIC | Facility: CLINIC | Age: 54
End: 2024-08-19
Payer: COMMERCIAL

## 2024-08-19 VITALS
OXYGEN SATURATION: 98 % | TEMPERATURE: 98 F | BODY MASS INDEX: 26.93 KG/M2 | HEIGHT: 69 IN | SYSTOLIC BLOOD PRESSURE: 138 MMHG | HEART RATE: 102 BPM | DIASTOLIC BLOOD PRESSURE: 86 MMHG | WEIGHT: 181.8 LBS

## 2024-08-19 DIAGNOSIS — E78.5 HYPERLIPIDEMIA ASSOCIATED WITH TYPE 2 DIABETES MELLITUS  (HCC): Primary | ICD-10-CM

## 2024-08-19 DIAGNOSIS — E11.9 TYPE 2 DIABETES MELLITUS WITHOUT COMPLICATION, WITHOUT LONG-TERM CURRENT USE OF INSULIN (HCC): ICD-10-CM

## 2024-08-19 DIAGNOSIS — F41.9 ANXIETY: ICD-10-CM

## 2024-08-19 DIAGNOSIS — F41.1 GENERALIZED ANXIETY DISORDER: ICD-10-CM

## 2024-08-19 DIAGNOSIS — Z00.00 WELLNESS EXAMINATION: ICD-10-CM

## 2024-08-19 DIAGNOSIS — E11.69 HYPERLIPIDEMIA ASSOCIATED WITH TYPE 2 DIABETES MELLITUS  (HCC): Primary | ICD-10-CM

## 2024-08-19 DIAGNOSIS — Z12.11 SCREEN FOR COLON CANCER: ICD-10-CM

## 2024-08-19 PROCEDURE — 99396 PREV VISIT EST AGE 40-64: CPT | Performed by: FAMILY MEDICINE

## 2024-08-19 PROCEDURE — 99214 OFFICE O/P EST MOD 30 MIN: CPT | Performed by: FAMILY MEDICINE

## 2024-08-19 RX ORDER — CLONAZEPAM 2 MG/1
2 TABLET ORAL 2 TIMES DAILY
Qty: 60 TABLET | Refills: 5 | Status: SHIPPED | OUTPATIENT
Start: 2024-08-19

## 2024-08-19 RX ORDER — GLIPIZIDE 5 MG/1
5 TABLET ORAL DAILY
Qty: 90 TABLET | Refills: 3 | Status: SHIPPED | OUTPATIENT
Start: 2024-08-19

## 2024-08-19 NOTE — PROGRESS NOTES
"Assessment/Plan:    No problem-specific Assessment & Plan notes found for this encounter.       Diagnoses and all orders for this visit:    Hyperlipidemia associated with type 2 diabetes mellitus  (HCC)    Generalized anxiety disorder    Type 2 diabetes mellitus without complication, without long-term current use of insulin (HCC)    Anxiety    Wellness examination          Subjective:   Chief Complaint   Patient presents with    Physical Exam     Lab results follow up.    [place]- active script.         Patient ID: Arthur Aly is a 54 y.o. male.    HPI    The following portions of the patient's history were reviewed and updated as appropriate: allergies, current medications, past family history, past medical history, past social history, past surgical history and problem list.    Review of Systems   Constitutional:  Negative for fatigue, fever and unexpected weight change.   HENT:  Negative for congestion, sinus pain and sore throat.    Eyes:  Negative for visual disturbance.   Respiratory:  Negative for shortness of breath and wheezing.    Cardiovascular:  Negative for chest pain and palpitations.   Gastrointestinal:  Negative for abdominal pain, nausea and vomiting.   Musculoskeletal: Negative.  Negative for arthralgias and myalgias.   Neurological:  Negative for syncope, weakness and numbness.   Psychiatric/Behavioral: Negative.  Negative for confusion, dysphoric mood and suicidal ideas.          Objective:  Vitals:    08/19/24 1535   BP: 138/86   BP Location: Right arm   Patient Position: Sitting   Cuff Size: Standard   Pulse: 102   Temp: 98 °F (36.7 °C)   TempSrc: Tympanic   SpO2: 98%   Weight: 82.5 kg (181 lb 12.8 oz)   Height: 5' 9\" (1.753 m)      Physical Exam  Constitutional:       Appearance: He is well-developed.   HENT:      Right Ear: Ear canal normal. Tympanic membrane is not injected.      Left Ear: Ear canal normal. Tympanic membrane is not injected.      Nose: Nose normal.   Eyes:      General:       "   Right eye: No discharge.         Left eye: No discharge.      Conjunctiva/sclera: Conjunctivae normal.      Pupils: Pupils are equal, round, and reactive to light.   Neck:      Thyroid: No thyromegaly.   Cardiovascular:      Rate and Rhythm: Normal rate and regular rhythm.      Heart sounds: Normal heart sounds. No murmur heard.  Pulmonary:      Effort: Pulmonary effort is normal. No respiratory distress.      Breath sounds: Normal breath sounds. No wheezing.   Abdominal:      General: Bowel sounds are normal. There is no distension.      Palpations: Abdomen is soft.      Tenderness: There is no abdominal tenderness.   Musculoskeletal:         General: Normal range of motion.      Cervical back: Normal range of motion and neck supple.   Lymphadenopathy:      Cervical: No cervical adenopathy.   Skin:     General: Skin is warm and dry.   Neurological:      Mental Status: He is alert and oriented to person, place, and time. He is not disoriented.      Sensory: No sensory deficit.      Motor: No weakness.      Coordination: Coordination normal.      Gait: Gait normal.      Deep Tendon Reflexes: Reflexes are normal and symmetric.   Psychiatric:         Speech: Speech normal.         Behavior: Behavior normal.         Thought Content: Thought content normal.         Judgment: Judgment normal.

## 2024-08-19 NOTE — PROGRESS NOTES
HPI:  Arthur Aly is a 54 y.o. male here for his yearly health maintenance exam.   Patient Active Problem List   Diagnosis    Mild intermittent asthma without complication    Type 2 diabetes mellitus without complication, without long-term current use of insulin (HCC)    Cigarette nicotine dependence without complication    Generalized anxiety disorder    Family history of prostate cancer in father    Diarrhea    Anxiety    Personal history of COVID-19    Hyperlipidemia associated with type 2 diabetes mellitus  (HCC)    COVID     Past Medical History:   Diagnosis Date    Anxiety     Asthma     Depression     Diabetes mellitus (HCC)     Diverticulitis     Hyperlipemia     RESOLVED [id number]    Medial meniscus tear     LAST ASSESSED [id number]    Moderate single current episode of major depressive disorder (HCC) 03/11/2019    Psychiatric disorder     Psychiatric illness     Suicide attempt (HCC)        1. Advanced Directive: n     2. Durable Power of  for Healthcare: n     3. Social History:           Drug and alcohol History: n                  4. Immunizations up to date: y                 Lifestyle:                           Healthy Diet:y                          Alcohol Use:n                          Tobacco Use:n                          Regular exercise:y                          Weight concerns:n                               5. Over the past 2 weeks, how often have you been bothered by the following:              Little interest or pleasure in doing things:n              Felling down, depressed or hopeless:n       Current Outpatient Medications   Medication Sig Dispense Refill    atorvastatin (LIPITOR) 10 mg tablet Take 1 tablet by mouth once daily 90 tablet 1    clonazePAM (KlonoPIN) 2 mg tablet Take 1 tablet by mouth twice daily 60 tablet 5    Empagliflozin 25 MG TABS Take 1 tablet (25 mg total) by mouth daily 90 tablet 3    glipiZIDE (GLUCOTROL) 5 mg tablet Take 1 tablet (5 mg total) by mouth 2  (two) times a day before meals 180 tablet 3    lisinopril (ZESTRIL) 20 mg tablet Take 1 tablet (20 mg total) by mouth daily 90 tablet 3    melatonin 3 mg Take 3-6mg by mouth daily at bedtime as needed for insomnia 1 tablet 0    metFORMIN (GLUCOPHAGE-XR) 500 mg 24 hr tablet Take 1 tablet (500 mg total) by mouth 2 (two) times a day with meals 180 tablet 3    triamcinolone (KENALOG) 0.5 % ointment Apply topically 2 (two) times a day 30 g 3     No current facility-administered medications for this visit.     Allergies   Allergen Reactions    Erythromycin Diarrhea and Abdominal Pain     Immunization History   Administered Date(s) Administered    COVID-19 PFIZER VACCINE 0.3 ML IM 03/16/2021, 04/05/2021, 10/13/2021    Influenza Quadrivalent Preservative Free 3 years and older IM 01/09/2017    Tdap 03/19/2018       Patient Care Team:  Migel Fernandez MD as PCP - General (Family Medicine)  Eun Santos MD    Review of Systems   Constitutional:  Negative for chills and fever.   HENT:  Negative for facial swelling and sinus pressure.    Eyes:  Negative for visual disturbance.   Respiratory:  Negative for shortness of breath and wheezing.    Cardiovascular:  Negative for chest pain.   Gastrointestinal:  Negative for abdominal pain.   Musculoskeletal:  Negative for myalgias.   Skin:  Negative for rash.   Neurological:  Negative for weakness.   Psychiatric/Behavioral:  Negative for confusion and hallucinations.    Patient's shoes and socks removed.    Right Foot/Ankle   Right Foot Inspection  Skin Exam: skin normal and skin intact. No dry skin, no warmth, no callus, no erythema, no maceration, no abnormal color, no pre-ulcer, no ulcer and no callus.     Toe Exam: ROM and strength within normal limits.     Sensory   Vibration: intact  Proprioception: intact  Monofilament testing: intact    Vascular  Capillary refills: < 3 seconds  The right DP pulse is 2+. The right PT pulse is 2+.     Left Foot/Ankle  Left Foot Inspection  Skin  Exam: skin normal and skin intact. No dry skin, no warmth, no erythema, no maceration, normal color, no pre-ulcer, no ulcer and no callus.     Toe Exam: ROM and strength within normal limits.     Sensory   Vibration: intact  Proprioception: intact  Monofilament testing: intact    Vascular  Capillary refills: < 3 seconds  The left DP pulse is 2+. The left PT pulse is 2+.     Assign Risk Category  No deformity present  No loss of protective sensation  No weak pulses  Risk: 0        Physical Exam :  Physical Exam  Constitutional:       General: He is not in acute distress.     Appearance: He is well-developed. He is not diaphoretic.   HENT:      Right Ear: Ear canal normal. Tympanic membrane is not injected.      Left Ear: Ear canal normal. Tympanic membrane is not injected.      Nose: Nose normal.      Mouth/Throat:      Pharynx: No oropharyngeal exudate.   Eyes:      Conjunctiva/sclera: Conjunctivae normal.      Pupils: Pupils are equal, round, and reactive to light.   Neck:      Thyroid: No thyromegaly.   Cardiovascular:      Rate and Rhythm: Normal rate and regular rhythm.      Pulses: no weak pulses.           Dorsalis pedis pulses are 2+ on the right side and 2+ on the left side.        Posterior tibial pulses are 2+ on the right side and 2+ on the left side.      Heart sounds: Normal heart sounds. No murmur heard.  Pulmonary:      Effort: Pulmonary effort is normal. No respiratory distress.      Breath sounds: Normal breath sounds. No wheezing.   Abdominal:      General: Bowel sounds are normal.      Palpations: Abdomen is soft. There is no hepatomegaly, splenomegaly or mass.      Tenderness: There is no abdominal tenderness.   Musculoskeletal:         General: No tenderness or deformity. Normal range of motion.      Cervical back: Normal range of motion and neck supple.   Feet:      Right foot:      Skin integrity: No ulcer, skin breakdown, erythema, warmth, callus or dry skin.      Left foot:      Skin  integrity: No ulcer, skin breakdown, erythema, warmth, callus or dry skin.   Skin:     General: Skin is warm and dry.      Coloration: Skin is not pale.      Findings: No rash.   Neurological:      Mental Status: He is alert and oriented to person, place, and time. He is not disoriented.      Sensory: No sensory deficit.      Gait: Gait normal.   Psychiatric:         Speech: Speech normal.         Behavior: Behavior normal.           Assessment and Plan:  1. Hyperlipidemia associated with type 2 diabetes mellitus  (HCC)        2. Generalized anxiety disorder        3. Type 2 diabetes mellitus without complication, without long-term current use of insulin (HCC)  Albumin / creatinine urine ratio    Basic metabolic panel    Hemoglobin A1C    Albumin / creatinine urine ratio    Basic metabolic panel    Hemoglobin A1C      4. Anxiety        5. Wellness examination            Health Maintenance Due   Topic Date Due    Hepatitis C Screening  Never done    Pneumococcal Vaccine: Pediatrics (0 to 5 Years) and At-Risk Patients (6 to 64 Years) (1 of 2 - PCV) Never done    HIV Screening  Never done    Lung Cancer Screening  Never done    Zoster Vaccine (1 of 2) Never done    COVID-19 Vaccine (4 - 2023-24 season) 09/01/2023    Annual Physical  05/15/2024    Colorectal Cancer Screening  06/10/2024    Influenza Vaccine (1) 09/01/2024

## 2024-09-27 ENCOUNTER — OFFICE VISIT (OUTPATIENT)
Dept: FAMILY MEDICINE CLINIC | Facility: CLINIC | Age: 54
End: 2024-09-27
Payer: COMMERCIAL

## 2024-09-27 ENCOUNTER — TELEPHONE (OUTPATIENT)
Age: 54
End: 2024-09-27

## 2024-09-27 ENCOUNTER — PREP FOR PROCEDURE (OUTPATIENT)
Age: 54
End: 2024-09-27

## 2024-09-27 VITALS
TEMPERATURE: 98.3 F | DIASTOLIC BLOOD PRESSURE: 84 MMHG | SYSTOLIC BLOOD PRESSURE: 122 MMHG | HEART RATE: 108 BPM | OXYGEN SATURATION: 96 %

## 2024-09-27 DIAGNOSIS — Z12.2 SCREENING FOR LUNG CANCER: ICD-10-CM

## 2024-09-27 DIAGNOSIS — Z86.010 HX OF COLONIC POLYPS: Primary | ICD-10-CM

## 2024-09-27 DIAGNOSIS — Z86.0100 HX OF COLONIC POLYPS: Primary | ICD-10-CM

## 2024-09-27 DIAGNOSIS — E11.69 HYPERLIPIDEMIA ASSOCIATED WITH TYPE 2 DIABETES MELLITUS  (HCC): ICD-10-CM

## 2024-09-27 DIAGNOSIS — E78.5 HYPERLIPIDEMIA ASSOCIATED WITH TYPE 2 DIABETES MELLITUS  (HCC): ICD-10-CM

## 2024-09-27 DIAGNOSIS — E11.9 TYPE 2 DIABETES MELLITUS WITHOUT COMPLICATION, WITHOUT LONG-TERM CURRENT USE OF INSULIN (HCC): Primary | ICD-10-CM

## 2024-09-27 DIAGNOSIS — K57.32 DIVERTICULITIS OF SIGMOID COLON: ICD-10-CM

## 2024-09-27 PROCEDURE — 99214 OFFICE O/P EST MOD 30 MIN: CPT | Performed by: FAMILY MEDICINE

## 2024-09-27 NOTE — TELEPHONE ENCOUNTER
Scheduled date of colonoscopy (as of today): 10/18/2024  Physician performing colonoscopy: DR OQUENDO  Location of colonoscopy: [facility]  Bowel prep reviewed with patient: MIRALAX/DULCOLAX  Instructions reviewed with patient by: Alexa via telephone. Procedure directions sent via [employer]  Clearances:     DIABETIC:Metformin,glipizide

## 2024-09-27 NOTE — TELEPHONE ENCOUNTER
09/27/24  Screened by: Alexa Gomez MA    Referring Provider  5 YR REPEAT  Pre- Screening:     There is no height or weight on file to calculate BMI.  Has patient been referred for a routine screening Colonoscopy? yes  Is the patient between 45-75 years old? yes      Previous Colonoscopy YES   If yes:    Date: 10/18/2019    Facility:  [facility]     Reason: HEMORRHOIDS        Does the patient want to see a Gastroenterologist prior to their procedure OR are they having any GI symptoms? no    Has the patient been hospitalized or had abdominal surgery in the past 6 months? no    Does the patient use supplemental oxygen? no    Does the patient take Coumadin, Lovenox, Plavix, Elliquis, Xarelto, or other blood thinning medication? no    Has the patient had a stroke, cardiac event, or stent placed in the past year? no    If patient is between 45yrs - 49yrs, please advise patient that we will have to confirm benefits & coverage with their insurance company for a routine screening colonoscopy.

## 2024-09-27 NOTE — PROGRESS NOTES
Assessment/Plan:    Diverticulitis of sigmoid colon  Prev surg---? Recurrence---will Rx augmentin--ER if worse       Diagnoses and all orders for this visit:    Type 2 diabetes mellitus without complication, without long-term current use of insulin (HCC)    Hyperlipidemia associated with type 2 diabetes mellitus  (HCC)    Screening for lung cancer  -     CT lung screening program; Future    Diverticulitis of sigmoid colon          Subjective:   Chief Complaint   Patient presents with    GI Problem     Middle lower abdomen pt. States he has a painful spasm. He also states his stomach feels hard to the touch. Spasms started three days ago and has started to spread laterally.         Patient ID: Arthur Aly is a 54 y.o. male.    GI Problem  Primary symptoms do not include fever, fatigue, abdominal pain, nausea, vomiting, myalgias or arthralgias.       The following portions of the patient's history were reviewed and updated as appropriate: allergies, current medications, past family history, past medical history, past social history, past surgical history and problem list.    Review of Systems   Constitutional:  Negative for fatigue, fever and unexpected weight change.   HENT:  Negative for congestion, sinus pain and sore throat.    Eyes:  Negative for visual disturbance.   Respiratory:  Negative for shortness of breath and wheezing.    Cardiovascular:  Negative for chest pain and palpitations.   Gastrointestinal:  Negative for abdominal pain, nausea and vomiting.   Musculoskeletal: Negative.  Negative for arthralgias and myalgias.   Neurological:  Negative for syncope, weakness and numbness.   Psychiatric/Behavioral: Negative.  Negative for confusion, dysphoric mood and suicidal ideas.          Objective:  Vitals:    09/27/24 1353   BP: 122/84   BP Location: Left arm   Patient Position: Sitting   Cuff Size: Standard   Pulse: (!) 108   Temp: 98.3 °F (36.8 °C)   TempSrc: Tympanic   SpO2: 96%      Physical  Exam  Constitutional:       Appearance: He is well-developed.   HENT:      Right Ear: Ear canal normal. Tympanic membrane is not injected.      Left Ear: Ear canal normal. Tympanic membrane is not injected.      Nose: Nose normal.   Eyes:      General:         Right eye: No discharge.         Left eye: No discharge.      Conjunctiva/sclera: Conjunctivae normal.      Pupils: Pupils are equal, round, and reactive to light.   Neck:      Thyroid: No thyromegaly.   Cardiovascular:      Rate and Rhythm: Normal rate and regular rhythm.      Heart sounds: Normal heart sounds. No murmur heard.  Pulmonary:      Effort: Pulmonary effort is normal. No respiratory distress.      Breath sounds: Normal breath sounds. No wheezing.   Abdominal:      General: Bowel sounds are normal. There is no distension.      Palpations: Abdomen is soft.      Tenderness: There is no abdominal tenderness.   Musculoskeletal:         General: Normal range of motion.      Cervical back: Normal range of motion and neck supple.   Lymphadenopathy:      Cervical: No cervical adenopathy.   Skin:     General: Skin is warm and dry.   Neurological:      Mental Status: He is alert and oriented to person, place, and time. He is not disoriented.      Sensory: No sensory deficit.      Motor: No weakness.      Coordination: Coordination normal.      Gait: Gait normal.      Deep Tendon Reflexes: Reflexes are normal and symmetric.   Psychiatric:         Speech: Speech normal.         Behavior: Behavior normal.         Thought Content: Thought content normal.         Judgment: Judgment normal.       Tender  lower abdom---neris L

## 2024-09-28 ENCOUNTER — HOSPITAL ENCOUNTER (EMERGENCY)
Facility: HOSPITAL | Age: 54
Discharge: HOME | End: 2024-09-28
Attending: EMERGENCY MEDICINE | Admitting: EMERGENCY MEDICINE
Payer: COMMERCIAL

## 2024-09-28 ENCOUNTER — APPOINTMENT (EMERGENCY)
Dept: CT IMAGING | Facility: HOSPITAL | Age: 54
End: 2024-09-28
Payer: COMMERCIAL

## 2024-09-28 VITALS
HEART RATE: 86 BPM | DIASTOLIC BLOOD PRESSURE: 82 MMHG | TEMPERATURE: 98.6 F | RESPIRATION RATE: 14 BRPM | WEIGHT: 184 LBS | BODY MASS INDEX: 27.17 KG/M2 | SYSTOLIC BLOOD PRESSURE: 129 MMHG | OXYGEN SATURATION: 97 %

## 2024-09-28 DIAGNOSIS — R10.9 ABDOMINAL PAIN: Primary | ICD-10-CM

## 2024-09-28 DIAGNOSIS — K42.9 UMBILICAL HERNIA: ICD-10-CM

## 2024-09-28 DIAGNOSIS — K57.92 ACUTE DIVERTICULITIS: ICD-10-CM

## 2024-09-28 DIAGNOSIS — R91.1 PULMONARY NODULE: ICD-10-CM

## 2024-09-28 LAB
ALBUMIN SERPL BCG-MCNC: 4.5 G/DL (ref 3.5–5)
ALP SERPL-CCNC: 51 U/L (ref 34–104)
ALT SERPL W P-5'-P-CCNC: 18 U/L (ref 7–52)
ANION GAP SERPL CALCULATED.3IONS-SCNC: 8 MMOL/L (ref 4–13)
AST SERPL W P-5'-P-CCNC: 16 U/L (ref 13–39)
BACTERIA UR QL AUTO: NORMAL /HPF
BASOPHILS # BLD AUTO: 0.02 THOUSANDS/ΜL (ref 0–0.1)
BASOPHILS NFR BLD AUTO: 0 % (ref 0–1)
BILIRUB SERPL-MCNC: 1.23 MG/DL (ref 0.2–1)
BILIRUB UR QL STRIP: NEGATIVE
BUN SERPL-MCNC: 12 MG/DL (ref 5–25)
CALCIUM SERPL-MCNC: 9.2 MG/DL (ref 8.4–10.2)
CHLORIDE SERPL-SCNC: 104 MMOL/L (ref 96–108)
CLARITY UR: CLEAR
CO2 SERPL-SCNC: 26 MMOL/L (ref 21–32)
COLOR UR: YELLOW
CREAT SERPL-MCNC: 0.74 MG/DL (ref 0.6–1.3)
EOSINOPHIL # BLD AUTO: 0.2 THOUSAND/ΜL (ref 0–0.61)
EOSINOPHIL NFR BLD AUTO: 4 % (ref 0–6)
ERYTHROCYTE [DISTWIDTH] IN BLOOD BY AUTOMATED COUNT: 12.1 % (ref 11.6–15.1)
GFR SERPL CREATININE-BSD FRML MDRD: 103 ML/MIN/1.73SQ M
GLUCOSE SERPL-MCNC: 138 MG/DL (ref 65–140)
GLUCOSE UR STRIP-MCNC: ABNORMAL MG/DL
HCT VFR BLD AUTO: 46.4 % (ref 36.5–49.3)
HGB BLD-MCNC: 15.8 G/DL (ref 12–17)
HGB UR QL STRIP.AUTO: NEGATIVE
IMM GRANULOCYTES # BLD AUTO: 0.01 THOUSAND/UL (ref 0–0.2)
IMM GRANULOCYTES NFR BLD AUTO: 0 % (ref 0–2)
KETONES UR STRIP-MCNC: ABNORMAL MG/DL
LEUKOCYTE ESTERASE UR QL STRIP: NEGATIVE
LIPASE SERPL-CCNC: 10 U/L (ref 11–82)
LYMPHOCYTES # BLD AUTO: 1.41 THOUSANDS/ΜL (ref 0.6–4.47)
LYMPHOCYTES NFR BLD AUTO: 26 % (ref 14–44)
MCH RBC QN AUTO: 33.2 PG (ref 26.8–34.3)
MCHC RBC AUTO-ENTMCNC: 34.1 G/DL (ref 31.4–37.4)
MCV RBC AUTO: 98 FL (ref 82–98)
MONOCYTES # BLD AUTO: 0.45 THOUSAND/ΜL (ref 0.17–1.22)
MONOCYTES NFR BLD AUTO: 8 % (ref 4–12)
NEUTROPHILS # BLD AUTO: 3.45 THOUSANDS/ΜL (ref 1.85–7.62)
NEUTS SEG NFR BLD AUTO: 62 % (ref 43–75)
NITRITE UR QL STRIP: NEGATIVE
NON-SQ EPI CELLS URNS QL MICRO: NORMAL /HPF
NRBC BLD AUTO-RTO: 0 /100 WBCS
PH UR STRIP.AUTO: 6 [PH]
PLATELET # BLD AUTO: 257 THOUSANDS/UL (ref 149–390)
PMV BLD AUTO: 8.8 FL (ref 8.9–12.7)
POTASSIUM SERPL-SCNC: 4 MMOL/L (ref 3.5–5.3)
PROT SERPL-MCNC: 7.7 G/DL (ref 6.4–8.4)
PROT UR STRIP-MCNC: ABNORMAL MG/DL
RBC # BLD AUTO: 4.76 MILLION/UL (ref 3.88–5.62)
RBC #/AREA URNS AUTO: NORMAL /HPF
SODIUM SERPL-SCNC: 138 MMOL/L (ref 135–147)
SP GR UR STRIP.AUTO: 1.01 (ref 1–1.03)
UROBILINOGEN UR STRIP-ACNC: <2 MG/DL
WBC # BLD AUTO: 5.54 THOUSAND/UL (ref 4.31–10.16)
WBC #/AREA URNS AUTO: NORMAL /HPF

## 2024-09-28 PROCEDURE — 74174 CTA ABD&PLVS W/CONTRAST: CPT

## 2024-09-28 PROCEDURE — 83690 ASSAY OF LIPASE: CPT | Performed by: PHYSICIAN ASSISTANT

## 2024-09-28 PROCEDURE — 96374 THER/PROPH/DIAG INJ IV PUSH: CPT

## 2024-09-28 PROCEDURE — 36415 COLL VENOUS BLD VENIPUNCTURE: CPT | Performed by: PHYSICIAN ASSISTANT

## 2024-09-28 PROCEDURE — 80053 COMPREHEN METABOLIC PANEL: CPT | Performed by: PHYSICIAN ASSISTANT

## 2024-09-28 PROCEDURE — 99284 EMERGENCY DEPT VISIT MOD MDM: CPT

## 2024-09-28 PROCEDURE — 71275 CT ANGIOGRAPHY CHEST: CPT

## 2024-09-28 PROCEDURE — 96375 TX/PRO/DX INJ NEW DRUG ADDON: CPT

## 2024-09-28 PROCEDURE — 96361 HYDRATE IV INFUSION ADD-ON: CPT

## 2024-09-28 PROCEDURE — 81001 URINALYSIS AUTO W/SCOPE: CPT | Performed by: PHYSICIAN ASSISTANT

## 2024-09-28 PROCEDURE — 85025 COMPLETE CBC W/AUTO DIFF WBC: CPT | Performed by: PHYSICIAN ASSISTANT

## 2024-09-28 RX ORDER — HYDROMORPHONE HCL/PF 1 MG/ML
0.5 SYRINGE (ML) INJECTION ONCE
Status: COMPLETED | OUTPATIENT
Start: 2024-09-28 | End: 2024-09-28

## 2024-09-28 RX ORDER — OXYCODONE HYDROCHLORIDE 5 MG/1
5 TABLET ORAL EVERY 4 HOURS PRN
Qty: 10 TABLET | Refills: 0 | Status: SHIPPED | OUTPATIENT
Start: 2024-09-28

## 2024-09-28 RX ORDER — MORPHINE SULFATE 4 MG/ML
4 INJECTION, SOLUTION INTRAMUSCULAR; INTRAVENOUS ONCE
Status: COMPLETED | OUTPATIENT
Start: 2024-09-28 | End: 2024-09-28

## 2024-09-28 RX ADMIN — MORPHINE SULFATE 4 MG: 4 INJECTION, SOLUTION INTRAMUSCULAR; INTRAVENOUS at 11:03

## 2024-09-28 RX ADMIN — IOHEXOL 100 ML: 350 INJECTION, SOLUTION INTRAVENOUS at 12:08

## 2024-09-28 RX ADMIN — HYDROMORPHONE HYDROCHLORIDE 0.5 MG: 0.5 INJECTION, SOLUTION INTRAMUSCULAR; INTRAVENOUS; SUBCUTANEOUS at 13:13

## 2024-09-28 RX ADMIN — SODIUM CHLORIDE 1000 ML: 0.9 INJECTION, SOLUTION INTRAVENOUS at 11:05

## 2024-09-28 NOTE — DISCHARGE INSTRUCTIONS
Rest, increase fliuds.  Clear liquid diet for next 48 hours, then slowly advance food.  Take augmentin 3 times a day.     Follow up with GI for recheck.  Return to ER if symptoms worsen, vomiting, fevers.  You have pulmonary nodule on CT scan, according to radiologist this is benign and no further action is needed.

## 2024-09-28 NOTE — ED PROVIDER NOTES
Final diagnoses:   Abdominal pain   Acute diverticulitis   Pulmonary nodule   Umbilical hernia     ED Disposition       ED Disposition   Discharge    Condition   Stable    Date/Time   Sat Sep 28, 2024  1:19 PM    Comment   Arthur Aly discharge to home/self care.                   Assessment & Plan       Medical Decision Making  Amount and/or Complexity of Data Reviewed  Labs: ordered.  Radiology: ordered.    Risk  Prescription drug management.    Patient with lower abdominal pain, upon exam he has pulsatile mass in abdomen.  No bruit auditory over aorta, bedside u/s performed by Dr. Brumfield, he does not see acute AAA.  Will obtain CTA to r/o aortic dissection, diverticulitis, colon perforation, colitis.    Patient with diverticulitis without complication on CT scan.  Will increase augmentin to TID given current guidelines.  Patient instructed to maintain soft diet if he can tolerate it and to f/u with GI.  Return precautions given.  Patient informed of benign pulm nodules and umbilical hernia.       ED Course as of 09/28/24 1443   Sat Sep 28, 2024   1332 Wife upset at bedside, would like to file complaint about BARRY Blanco, charge nurse aware and in room with patient and wife.     5318 Addendum:  pharmacist called and spoke with Abbie MCCARTY PA-C, to confirm it is ok to give oxycodone along with his banzos.  I did say it was ok that they fill oxy.        Medications   morphine injection 4 mg (4 mg Intravenous Given 9/28/24 1103)   sodium chloride 0.9 % bolus 1,000 mL (0 mL Intravenous Stopped 9/28/24 1306)   iohexol (OMNIPAQUE) 350 MG/ML injection (MULTI-DOSE) 100 mL (100 mL Intravenous Given 9/28/24 1208)   HYDROmorphone (DILAUDID) injection 0.5 mg (0.5 mg Intravenous Given 9/28/24 1313)       ED Risk Strat Scores                           SBIRT 22yo+      Flowsheet Row Most Recent Value   Initial Alcohol Screen: US AUDIT-C     1. How often do you have a drink containing alcohol? 0 Filed at: 09/28/2024 1040   2. How  many drinks containing alcohol do you have on a typical day you are drinking?  0 Filed at: 09/28/2024 1040   3a. Male UNDER 65: How often do you have five or more drinks on one occasion? 0 Filed at: 09/28/2024 1040   3b. FEMALE Any Age, or MALE 65+: How often do you have 4 or more drinks on one occassion? 0 Filed at: 09/28/2024 1040   Audit-C Score 0 Filed at: 09/28/2024 1040   CARLOS: How many times in the past year have you...    Used an illegal drug or used a prescription medication for non-medical reasons? Never Filed at: 09/28/2024 1040                            History of Present Illness       Chief Complaint   Patient presents with    Abdominal Pain     Abd pain since yesterday. Pain in lower center of abd, states he can feel spams in the spot that works. Saw PCP yesterday and was told to go to ER if pain worsens. Denies issues urinating.       Past Medical History:   Diagnosis Date    Anxiety     Asthma     Depression     Diabetes mellitus (HCC)     Diverticulitis     Hyperlipemia     RESOLVED [id number]    Medial meniscus tear     LAST ASSESSED [id number]    Moderate single current episode of major depressive disorder (HCC) 03/11/2019    Psychiatric disorder     Psychiatric illness     Suicide attempt ([facility])       Past Surgical History:   Procedure Laterality Date    ARTHROSCOPY KNEE Right     ONSET 7/3/2014    [place] LAPAROSCOPY COLECTOMY PARTIAL W/ANASTOMOSIS N/A 7/9/2019    Procedure: RESECTION COLON SIGMOID LAPAROSCOPIC WITH ANASTOMOSIS: INTRAOP COLONOSCOPY;  Surgeon: Malcolm Gutierrez MD;  Location:  MAIN OR;  Service: General    TONSILLECTOMY AND ADENOIDECTOMY      TOOTH EXTRACTION      WISDOM TEETH      Family History   Problem Relation Age of Onset    Diabetes Mother     Hypertension Mother     Prostate cancer Father     Substance Abuse Neg Hx     Mental illness Neg Hx       Social History     Tobacco Use    Smoking status: Every Day     Current packs/day: 1.00     Average packs/day: 1 pack/day for 30.0  "years (30.0 ttl pk-yrs)     Types: Cigarettes    Smokeless tobacco: Current   Vaping Use    Vaping status: Former    Substances: Nicotine, CBD, Flavoring   Substance Use Topics    Alcohol use: Yes     Comment: couple beers a day    Drug use: Not Currently     Types: Marijuana     Comment: MEDICAL MARIJUANA CARD       E-Cigarette/Vaping    E-Cigarette Use Former User       E-Cigarette/Vaping Substances    Nicotine Yes     THC No     CBD Yes     Flavoring Yes     Other No       I have reviewed and agree with the history as documented.       Abdominal Pain  Associated symptoms: diarrhea    Associated symptoms: no chills, no dysuria, no fever, no nausea and no vomiting      Patient is a 53 y/o M with h/o diverticulitis that presents to the ED with lower abdominal pain for 4 days.  He states it started as a \"spasm\" in his lower abdomen, then 2 days ago the pain started.  He denies nausea or vomiting.  He had an episode of diarrhea, but no BM today.  No blood in stool.  No fevers/chills.  Pain is worse with movement.    Patient had surgery 3-4 years ago for diverticulitis and has not had an episode since the surgery.        Review of Systems   Constitutional:  Negative for chills and fever.   HENT: Negative.     Gastrointestinal:  Positive for abdominal pain and diarrhea. Negative for blood in stool, nausea and vomiting.   Genitourinary:  Positive for frequency. Negative for dysuria.   Musculoskeletal:  Negative for back pain and neck pain.   Skin:  Negative for color change, pallor and rash.   Neurological:  Negative for dizziness, weakness, light-headedness and numbness.   Psychiatric/Behavioral:  Negative for confusion.    All other systems reviewed and are negative.          Objective       ED Triage Vitals   Temperature Pulse Blood Pressure Respirations SpO2 Patient Position - Orthostatic VS   09/28/24 1038 09/28/24 1105 09/28/24 1040 09/28/24 1038 09/28/24 1105 09/28/24 1105   98.6 °F (37 °C) 86 (!) 164/108 18 99 % " Lying      Temp src Heart Rate Source BP Location FiO2 (%) Pain Score    -- 09/28/24 1105 09/28/24 1038 -- 09/28/24 1038     Monitor Right arm  7      Vitals      Date and Time Temp Pulse SpO2 Resp BP Pain Score FACES Pain Rating User   09/28/24 1315 -- -- 97 % 14 129/82 -- -- AS   09/28/24 1313 -- -- -- -- -- 6 -- AS   09/28/24 1236 -- -- -- -- -- 5 -- MB   09/28/24 1139 -- -- -- -- -- 5 -- AS   09/28/24 1105 -- 86 99 % 14 127/84 -- -- AS   09/28/24 1103 -- -- -- -- -- 8 -- AS   09/28/24 1040 -- -- -- -- 164/108 -- -- ML   09/28/24 1038 98.6 °F (37 °C) -- -- 18 -- 7 -- ML            Physical Exam  Vitals and nursing note reviewed.   Constitutional:       General: He is in acute distress (patient appears to be in a moderate amount of pain.).      Appearance: He is well-developed and well-groomed. He is not ill-appearing or diaphoretic.   HENT:      Head: Normocephalic and atraumatic.      Mouth/Throat:      Mouth: Mucous membranes are moist.   Eyes:      Conjunctiva/sclera: Conjunctivae normal.   Cardiovascular:      Rate and Rhythm: Normal rate and regular rhythm.      Heart sounds: Normal heart sounds.   Pulmonary:      Effort: Pulmonary effort is normal.      Breath sounds: Normal breath sounds. No wheezing, rhonchi or rales.   Abdominal:      General: Abdomen is flat. Bowel sounds are normal.      Palpations: There is pulsatile mass.      Tenderness: There is abdominal tenderness in the right lower quadrant, suprapubic area and left lower quadrant. There is guarding and rebound.      Comments: On exam, patient has abdominal pulsations.  Bedside u/s performed by Dr. Brumfield, aorta appears enlarged on u/s.   Musculoskeletal:         General: Normal range of motion.      Cervical back: Normal range of motion.   Skin:     General: Skin is warm and dry.      Coloration: Skin is not jaundiced or pale.      Findings: No rash.   Neurological:      General: No focal deficit present.      Mental Status: He is alert and  oriented to person, place, and time.      Motor: No weakness.   Psychiatric:         Mood and Affect: Mood normal.         Behavior: Behavior is cooperative.         Results Reviewed       Procedure Component Value Units Date/Time    Comprehensive metabolic panel [[id number]]  (Abnormal) Collected: 09/28/24 1049    Lab Status: Final result Specimen: Blood from Arm, Right Updated: 09/28/24 1113     Sodium 138 mmol/L      Potassium 4.0 mmol/L      Chloride 104 mmol/L      CO2 26 mmol/L      ANION GAP 8 mmol/L      BUN 12 mg/dL      Creatinine 0.74 mg/dL      Glucose 138 mg/dL      Calcium 9.2 mg/dL      AST 16 U/L      ALT 18 U/L      Alkaline Phosphatase 51 U/L      Total Protein 7.7 g/dL      Albumin 4.5 g/dL      Total Bilirubin 1.23 mg/dL      eGFR 103 ml/min/1.73sq m     Narrative:      National Kid[employer] Disease Foundation guidelines for Chronic Kidney Disease (CKD):     Stage 1 with normal or high GFR (GFR > 90 mL/min/1.73 square meters)    Stage 2 Mild CKD (GFR = 60-89 mL/min/1.73 square meters)    Stage 3A Moderate CKD (GFR = 45-59 mL/min/1.73 square meters)    Stage 3B Moderate CKD (GFR = 30-44 mL/min/1.73 square meters)    Stage 4 Severe CKD (GFR = 15-29 mL/min/1.73 square meters)    Stage 5 End Stage CKD (GFR <15 mL/min/1.73 square meters)  Note: GFR calculation is accurate only with a steady state creatinine    Lipase [[id number]]  (Abnormal) Collected: 09/28/24 1049    Lab Status: Final result Specimen: Blood from Arm, Right Updated: 09/28/24 1113     Lipase 10 u/L     Urine Microscopic [[id number]]  (Normal) Collected: 09/28/24 1046    Lab Status: Final result Specimen: Urine, Clean Catch Updated: 09/28/24 1109     RBC, UA None Seen /hpf      WBC, UA 0-1 /hpf      Epithelial Cells Occasional /hpf      Bacteria, UA None Seen /hpf     UA w Reflex to Microscopic w Reflex to Culture [[id number]]  (Abnormal) Collected: 09/28/24 1046    Lab Status: Final result Specimen: Urine, Clean Catch Updated: 09/28/24 1108      Color, UA Yellow     Clarity, UA Clear     Specific Gravity, UA 1.015     pH, UA 6.0     Leukocytes, UA Negative     Nitrite, UA Negative     Protein, UA Trace mg/dl      Glucose, UA 1000 (1%) mg/dl      Ketones, UA Trace mg/dl      Urobilinogen, UA <2.0 mg/dl      Bilirubin, UA Negative     Occult Blood, UA Negative    CBC and differential [[id number]]  (Abnormal) Collected: 09/28/24 1049    Lab Status: Final result Specimen: Blood from Arm, Right Updated: 09/28/24 1055     WBC 5.54 Thousand/uL      RBC 4.76 Million/uL      Hemoglobin 15.8 g/dL      Hematocrit 46.4 %      MCV 98 fL      MCH 33.2 pg      MCHC 34.1 g/dL      RDW 12.1 %      MPV 8.8 fL      Platelets 257 Thousands/uL      nRBC 0 /100 WBCs      Segmented % 62 %      Immature Grans % 0 %      Lymphocytes % 26 %      Monocytes % 8 %      Eosinophils Relative 4 %      Basophils Relative 0 %      Absolute Neutrophils 3.45 Thousands/µL      Absolute Immature Grans 0.01 Thousand/uL      Absolute Lymphocytes 1.41 Thousands/µL      Absolute Monocytes 0.45 Thousand/µL      Eosinophils Absolute 0.20 Thousand/µL      Basophils Absolute 0.02 Thousands/µL             CTA dissection protocol chest abdomen pelvis w wo contrast   Final Interpretation by Nelson Green MD (09/28 1302)      Mild acute mid descending colonic diverticulitis. No abscess or pneumoperitoneum.      No acute aortic findings. No aortic aneurysm.      The study was marked in EPIC for immediate notification.         Workstation performed: [id number]             Procedures    ED Medication and Procedure Management   Prior to Admission Medications   Prescriptions Last Dose Informant Patient Reported? Taking?   Empagliflozin 25 MG TABS   No No   Sig: Take 1 tablet (25 mg total) by mouth daily   amoxicillin-clavulanate (AUGMENTIN) 875-125 mg per tablet   No No   Sig: Take 1 tablet by mouth every 12 (twelve) hours for 10 days   atorvastatin (LIPITOR) 10 mg tablet   No No   Sig: Take 1  tablet by mouth once daily   clonazePAM (KlonoPIN) 2 mg tablet   No No   Sig: Take 1 tablet (2 mg total) by mouth 2 (two) times a day   glipiZIDE (GLUCOTROL) 5 mg tablet   No No   Sig: Take 1 tablet (5 mg total) by mouth in the morning   lisinopril (ZESTRIL) 20 mg tablet   No No   Sig: Take 1 tablet (20 mg total) by mouth daily   melatonin 3 mg  Self No No   Sig: Take 3-6mg by mouth daily at bedtime as needed for insomnia   metFORMIN (GLUCOPHAGE-XR) 500 mg 24 hr tablet   No No   Sig: Take 1 tablet (500 mg total) by mouth 2 (two) times a day with meals   triamcinolone (KENALOG) 0.5 % ointment   No No   Sig: Apply topically 2 (two) times a day      Facility-Administered Medications: None     Discharge Medication List as of 9/28/2024  1:31 PM        START taking these medications    Details   !! amoxicillin-clavulanate (AUGMENTIN) 875-125 mg per tablet Take 1 tablet by mouth every 8 (eight) hours for 9 doses, Starting Sat 9/28/2024, Until Tue 10/1/2024, Normal      oxyCODONE (Roxicodone) 5 immediate release tablet Take 1 tablet (5 mg total) by mouth every 4 (four) hours as needed for moderate pain Max Daily Amount: 30 mg, Starting Sat 9/28/2024, Normal       !! - Potential duplicate medications found. Please discuss with provider.        CONTINUE these medications which have NOT CHANGED    Details   !! amoxicillin-clavulanate (AUGMENTIN) 875-125 mg per tablet Take 1 tablet by mouth every 12 (twelve) hours for 10 days, Starting Fri 9/27/2024, Until Mon 10/7/2024, Normal      atorvastatin (LIPITOR) 10 mg tablet Take 1 tablet by mouth once daily, Starting Thu 7/18/2024, Normal      clonazePAM (KlonoPIN) 2 mg tablet Take 1 tablet (2 mg total) by mouth 2 (two) times a day, Starting Mon 8/19/2024, Normal      Empagliflozin 25 MG TABS Take 1 tablet (25 mg total) by mouth daily, Starting Wed 5/1/2024, Until Sat 4/26/2025, Normal      glipiZIDE (GLUCOTROL) 5 mg tablet Take 1 tablet (5 mg total) by mouth in the morning, Starting  Mon 8/19/2024, Print      lisinopril (ZESTRIL) 20 mg tablet Take 1 tablet (20 mg total) by mouth daily, Starting Wed 5/1/2024, Normal      melatonin 3 mg Take 3-6mg by mouth daily at bedtime as needed for insomnia, No Print      metFORMIN (GLUCOPHAGE-XR) 500 mg 24 hr tablet Take 1 tablet (500 mg total) by mouth 2 (two) times a day with meals, Starting Wed 1/24/2024, Normal      triamcinolone (KENALOG) 0.5 % ointment Apply topically 2 (two) times a day, Starting Wed 5/1/2024, Normal       !! - Potential duplicate medications found. Please discuss with provider.        No discharge procedures on file.  ED SEPSIS DOCUMENTATION   Time reflects when diagnosis was documented in both MDM as applicable and the Disposition within this note       Time User Action Codes Description Comment    9/28/2024  1:19 PM Bharti Morton [R10.9] Abdominal pain     9/28/2024  1:19 PM Bharti Morton [K57.92] Acute diverticulitis     9/28/2024  1:27 PM Bharti Morton [R91.1] Pulmonary nodule     9/28/2024  1:27 PM Bharti Morton [K42.9] Umbilical hernia                  Bharti Morton PA-C  09/28/24 5621

## 2024-10-02 ENCOUNTER — TELEPHONE (OUTPATIENT)
Dept: GASTROENTEROLOGY | Facility: AMBULATORY SURGERY CENTER | Age: 54
End: 2024-10-02

## 2024-10-04 ENCOUNTER — ANESTHESIA (OUTPATIENT)
Dept: ANESTHESIOLOGY | Facility: AMBULATORY SURGERY CENTER | Age: 54
End: 2024-10-04

## 2024-10-04 ENCOUNTER — ANESTHESIA EVENT (OUTPATIENT)
Dept: ANESTHESIOLOGY | Facility: AMBULATORY SURGERY CENTER | Age: 54
End: 2024-10-04

## 2024-10-09 ENCOUNTER — TELEPHONE (OUTPATIENT)
Dept: GASTROENTEROLOGY | Facility: CLINIC | Age: 54
End: 2024-10-09

## 2024-10-09 NOTE — TELEPHONE ENCOUNTER
Procedure confirmed  Colonoscopy     Via: Voice mail    Instructions given: MyChart     Prep Given: Miralax/Dulcolax /Diabetic    Call the office if there are any questions.      4 day Jardiance hold, last dose to be 10-13-24  Glipizide 1/2 dose pm prior  Metformin

## 2024-10-18 ENCOUNTER — HOSPITAL ENCOUNTER (OUTPATIENT)
Dept: GASTROENTEROLOGY | Facility: AMBULATORY SURGERY CENTER | Age: 54
Discharge: HOME | End: 2024-10-18
Attending: INTERNAL MEDICINE
Payer: COMMERCIAL

## 2024-10-18 ENCOUNTER — ANESTHESIA (OUTPATIENT)
Dept: GASTROENTEROLOGY | Facility: AMBULATORY SURGERY CENTER | Age: 54
End: 2024-10-18

## 2024-10-18 ENCOUNTER — ANESTHESIA EVENT (OUTPATIENT)
Dept: GASTROENTEROLOGY | Facility: AMBULATORY SURGERY CENTER | Age: 54
End: 2024-10-18

## 2024-10-18 VITALS
BODY MASS INDEX: 27.25 KG/M2 | WEIGHT: 184 LBS | OXYGEN SATURATION: 99 % | HEIGHT: 69 IN | TEMPERATURE: 97.8 F | SYSTOLIC BLOOD PRESSURE: 112 MMHG | RESPIRATION RATE: 18 BRPM | HEART RATE: 73 BPM | DIASTOLIC BLOOD PRESSURE: 68 MMHG

## 2024-10-18 DIAGNOSIS — Z86.0100 HX OF COLONIC POLYPS: ICD-10-CM

## 2024-10-18 PROCEDURE — 45380 COLONOSCOPY AND BIOPSY: CPT | Performed by: INTERNAL MEDICINE

## 2024-10-18 PROCEDURE — 88305 TISSUE EXAM BY PATHOLOGIST: CPT | Performed by: PATHOLOGY

## 2024-10-18 RX ORDER — SODIUM CHLORIDE 9 MG/ML
1 INJECTION, SOLUTION INTRAVENOUS CONTINUOUS
Status: DISCONTINUED | OUTPATIENT
Start: 2024-10-18 | End: 2024-10-22 | Stop reason: HOSPADM

## 2024-10-18 RX ORDER — PROPOFOL 10 MG/ML
INJECTION, EMULSION INTRAVENOUS AS NEEDED
Status: DISCONTINUED | OUTPATIENT
Start: 2024-10-18 | End: 2024-10-18

## 2024-10-18 RX ADMIN — PROPOFOL 50 MG: 10 INJECTION, EMULSION INTRAVENOUS at 09:43

## 2024-10-18 RX ADMIN — SODIUM CHLORIDE: 9 INJECTION, SOLUTION INTRAVENOUS at 09:26

## 2024-10-18 RX ADMIN — PROPOFOL 50 MG: 10 INJECTION, EMULSION INTRAVENOUS at 09:38

## 2024-10-18 RX ADMIN — PROPOFOL 100 MG: 10 INJECTION, EMULSION INTRAVENOUS at 09:37

## 2024-10-18 RX ADMIN — PROPOFOL 50 MG: 10 INJECTION, EMULSION INTRAVENOUS at 09:40

## 2024-10-18 NOTE — ANESTHESIA PREPROCEDURE EVALUATION
Procedure:  COLONOSCOPY    Relevant Problems   CARDIO   (+) Hyperlipidemia associated with type 2 diabetes mellitus  (HCC)      ENDO   (+) Type 2 diabetes mellitus without complication, without long-term current use of insulin (HCC)      NEURO/PSYCH   (+) Anxiety   (+) Generalized anxiety disorder      PULMONARY   (+) Mild intermittent asthma without complication        Physical Exam    Airway    Mallampati score: II  TM Distance: >3 FB  Neck ROM: full     Dental   No notable dental hx     Cardiovascular  Cardiovascular exam normal    Pulmonary  Pulmonary exam normal     Other Findings        Anesthesia Plan  ASA Score- 2     Anesthesia Type- IV sedation with anesthesia with ASA Monitors.         Additional Monitors:     Airway Plan:            Plan Factors-Exercise tolerance (METS): >4 METS.    Chart reviewed. EKG reviewed.  Existing labs reviewed. Patient summary reviewed.    Patient is not a current smoker.              Induction-     Postoperative Plan-     Perioperative Resuscitation Plan - Level 1 - Full Code.       Informed Consent- Anesthetic plan and risks discussed with patient.  I personally reviewed this patient with the CRNA. Discussed and agreed on the Anesthesia Plan with the CRNA..

## 2024-10-18 NOTE — ANESTHESIA POSTPROCEDURE EVALUATION
Post-Op Assessment Note    CV Status:  Stable  Pain Score: 0    Pain management: adequate       Mental Status:  Sleepy   Hydration Status:  Euvolemic and stable   PONV Controlled:  None   Airway Patency:  Patent     Post Op Vitals Reviewed: Yes    No anethesia notable event occurred.    Staff: CRNA         Last Filed PACU Vitals:  Vitals Value Taken Time   Temp     Pulse 75 10/18/24 0949   BP 98/63 10/18/24 0949   Resp 18 10/18/24 0949   SpO2 96 % 10/18/24 0949       Modified Lul:  Activity: 2 (10/18/2024 10:09 AM)  Respiration: 2 (10/18/2024 10:09 AM)  Circulation: 2 (10/18/2024 10:09 AM)  Consciousness: 2 (10/18/2024 10:09 AM)  Oxygen Saturation: 2 (10/18/2024 10:09 AM)  Modified Lul Score: 10 (10/18/2024 10:09 AM)

## 2024-10-18 NOTE — ANESTHESIA POSTPROCEDURE EVALUATION
Post-Op Assessment Note    CV Status:  Stable  Pain Score: 0    Pain management: adequate       Mental Status:  Sleepy   Hydration Status:  Euvolemic and stable   PONV Controlled:  None   Airway Patency:  Patent     Post Op Vitals Reviewed: Yes    No anethesia notable event occurred.    Staff: CRNA       Last Filed PACU Vitals:  Vitals Value Taken Time   Temp     Pulse 75 10/18/24 0949   BP 98/63 10/18/24 0949   Resp 18 10/18/24 0949   SpO2 96 % 10/18/24 0949       Modified Lul:  Activity: 2 (10/18/2024  9:12 AM)  Respiration: 2 (10/18/2024  9:12 AM)  Circulation: 2 (10/18/2024  9:12 AM)  Consciousness: 2 (10/18/2024  9:12 AM)  Oxygen Saturation: 2 (10/18/2024  9:12 AM)  Modified Lul Score: 10 (10/18/2024  9:12 AM)

## 2024-10-18 NOTE — H&P
History and Physical -  [facility]  Arthur Aly 54 y.o. male MRN: [id number]    HPI: Arthur Aly is a 54 y.o. male who presents for colonoscopy.  He last had a colonoscopy 5 years ago.  He also had sigmoidectomy due to diverticulitis    REVIEW OF SYSTEMS: Per the HPI, and otherwise unremarkable.    Historical Information   Past Medical History:   Diagnosis Date    Anxiety     Asthma     Depression     Diabetes mellitus (HCC)     Diverticulitis     Hyperlipemia     RESOLVED [id number]    Medial meniscus tear     LAST ASSESSED [id number]    Moderate single current episode of major depressive disorder (HCC) 03/11/2019    Psychiatric disorder     Psychiatric illness     Suicide attempt ([facility])      Past Surgical History:   Procedure Laterality Date    ARTHROSCOPY KNEE Right     ONSET 7/3/2014    [place] LAPAROSCOPY COLECTOMY PARTIAL W/ANASTOMOSIS N/A 7/9/2019    Procedure: RESECTION COLON SIGMOID LAPAROSCOPIC WITH ANASTOMOSIS: INTRAOP COLONOSCOPY;  Surgeon: Malcolm Gutierrez MD;  Location:  MAIN OR;  Service: General    TONSILLECTOMY AND ADENOIDECTOMY      TOOTH EXTRACTION      WISDOM TEETH     Social History   Social History     Substance and Sexual Activity   Alcohol Use Yes    Alcohol/week: 3.0 standard drinks of alcohol    Types: 3 Cans of beer per week    Comment: couple beers a day     Social History     Substance and Sexual Activity   Drug Use Not Currently    Types: Marijuana    Comment: MEDICAL MARIJUANA CARD      Social History     Tobacco Use   Smoking Status Every Day    Current packs/day: 1.00    Average packs/day: 1 pack/day for 30.0 years (30.0 ttl pk-yrs)    Types: Cigarettes   Smokeless Tobacco Current     Family History   Problem Relation Age of Onset    Diabetes Mother     Hypertension Mother     Colon polyps Mother     Prostate cancer Father     Substance Abuse Neg Hx     Mental illness Neg Hx        Meds/Allergies       Current Outpatient Medications:     atorvastatin (LIPITOR) 10 mg  "tablet    clonazePAM (KlonoPIN) 2 mg tablet    Empagliflozin 25 MG TABS    glipiZIDE (GLUCOTROL) 5 mg tablet    lisinopril (ZESTRIL) 20 mg tablet    metFORMIN (GLUCOPHAGE-XR) 500 mg 24 hr tablet    melatonin 3 mg    oxyCODONE (Roxicodone) 5 immediate release tablet    triamcinolone (KENALOG) 0.5 % ointment    Current Facility-Administered Medications:     sodium chloride 0.9 % infusion, 1 mL/kg/hr, Intravenous, Continuous    Allergies   Allergen Reactions    Erythromycin Diarrhea and Abdominal Pain       Objective     /85   Pulse 76   Temp 97.8 °F (36.6 °C) (Temporal)   Resp 16   Ht 5' 9\" (1.753 m)   Wt 83.5 kg (184 lb)   SpO2 99%   BMI 27.17 kg/m²     PHYSICAL EXAM    General Appearance: NAD, cooperative, alert  Eyes: Anicteric  GI:  Soft, non-tender, non-distended; normal bowel sounds; no masses, no organomegaly   Rectal: Deferred until procedure  Musculoskeletal: No edema.  Skin:  No jaundice    ASSESSMENT/PLAN:  This is a 54 y.o. male here for colonoscopy, and he is stable and optimized for his procedure.        "

## 2024-10-22 PROCEDURE — 88305 TISSUE EXAM BY PATHOLOGIST: CPT | Performed by: PATHOLOGY

## 2025-02-01 DIAGNOSIS — E11.9 TYPE 2 DIABETES MELLITUS WITHOUT COMPLICATION, WITHOUT LONG-TERM CURRENT USE OF INSULIN (HCC): ICD-10-CM

## 2025-02-01 RX ORDER — METFORMIN HYDROCHLORIDE 500 MG/1
500 TABLET, EXTENDED RELEASE ORAL 2 TIMES DAILY WITH MEALS
Qty: 60 TABLET | Refills: 0 | Status: SHIPPED | OUTPATIENT
Start: 2025-02-01

## 2025-02-25 DIAGNOSIS — E11.9 TYPE 2 DIABETES MELLITUS WITHOUT COMPLICATION, WITHOUT LONG-TERM CURRENT USE OF INSULIN (HCC): ICD-10-CM

## 2025-02-25 RX ORDER — ATORVASTATIN CALCIUM 10 MG/1
10 TABLET, FILM COATED ORAL DAILY
Qty: 90 TABLET | Refills: 1 | Status: SHIPPED | OUTPATIENT
Start: 2025-02-25

## 2025-03-13 DIAGNOSIS — F41.1 GENERALIZED ANXIETY DISORDER: ICD-10-CM

## 2025-03-14 RX ORDER — CLONAZEPAM 2 MG/1
2 TABLET ORAL 2 TIMES DAILY
Qty: 60 TABLET | Refills: 5 | Status: SHIPPED | OUTPATIENT
Start: 2025-03-14

## 2025-03-25 ENCOUNTER — HOSPITAL ENCOUNTER (OUTPATIENT)
Dept: RADIOLOGY | Facility: HOSPITAL | Age: 55
Discharge: HOME | End: 2025-03-25
Payer: COMMERCIAL

## 2025-03-25 ENCOUNTER — OFFICE VISIT (OUTPATIENT)
Dept: FAMILY MEDICINE CLINIC | Facility: CLINIC | Age: 55
End: 2025-03-25
Payer: COMMERCIAL

## 2025-03-25 ENCOUNTER — RESULTS FOLLOW-UP (OUTPATIENT)
Dept: FAMILY MEDICINE CLINIC | Facility: CLINIC | Age: 55
End: 2025-03-25

## 2025-03-25 VITALS
DIASTOLIC BLOOD PRESSURE: 88 MMHG | BODY MASS INDEX: 26.43 KG/M2 | OXYGEN SATURATION: 98 % | HEART RATE: 111 BPM | SYSTOLIC BLOOD PRESSURE: 130 MMHG | WEIGHT: 179 LBS | TEMPERATURE: 98.2 F

## 2025-03-25 DIAGNOSIS — M25.562 ACUTE PAIN OF LEFT KNEE: Primary | ICD-10-CM

## 2025-03-25 DIAGNOSIS — S89.92XA INJURY OF LEFT KNEE, INITIAL ENCOUNTER: ICD-10-CM

## 2025-03-25 DIAGNOSIS — E11.9 TYPE 2 DIABETES MELLITUS WITHOUT COMPLICATION, WITHOUT LONG-TERM CURRENT USE OF INSULIN (HCC): ICD-10-CM

## 2025-03-25 DIAGNOSIS — M25.562 ACUTE PAIN OF LEFT KNEE: ICD-10-CM

## 2025-03-25 LAB — SL AMB POCT HEMOGLOBIN AIC: 7.2 (ref ?–6.5)

## 2025-03-25 PROCEDURE — 99214 OFFICE O/P EST MOD 30 MIN: CPT | Performed by: NURSE PRACTITIONER

## 2025-03-25 PROCEDURE — 73564 X-RAY EXAM KNEE 4 OR MORE: CPT

## 2025-03-25 PROCEDURE — 83036 HEMOGLOBIN GLYCOSYLATED A1C: CPT | Performed by: NURSE PRACTITIONER

## 2025-03-25 NOTE — ASSESSMENT & PLAN NOTE
C/o left knee pain after a twisting injury.. Feels similar when he tore right knee. He was carrying groceries up the stairs when he misstepped causing his knee to twist. He has been taking Ibuprofen and applying ice. Compression made his pain worse. Pain 5/10 in the morning, 10/10 by the end of the day. Knee feels unstable.  Instructed to rest, elevate, and continue Ibuprofen for pain. He can have xray completed today. MRI ordered. Referred to ortho.    Orders:    XR knee 4+ vw left injury; Future    MRI knee left  wo contrast; Future    Ambulatory Referral to Orthopedic Surgery; Future

## 2025-03-25 NOTE — PATIENT INSTRUCTIONS
rest, elevate, and continue Ibuprofen for pain.    have xray completed today.   MRI ordered.  Follow up with ortho.    Your A1c is 7.2. Intensify diet-Decrease sugars/carbs. Continue current medications. Return in 3 months for diabetes recheck.

## 2025-03-25 NOTE — PROGRESS NOTES
Name: Arthur Aly      : 1970      MRN: [id number]  Encounter Provider: GISELL Xavier  Encounter Date: 3/25/2025   Encounter department: [facility]  :  Assessment & Plan  Acute pain of left knee  C/o left knee pain after a twisting injury.. Feels similar when he tore right knee. He was carrying groceries up the stairs when he misstepped causing his knee to twist. He has been taking Ibuprofen and applying ice. Compression made his pain worse. Pain 5/10 in the morning, 10/10 by the end of the day. Knee feels unstable.  Instructed to rest, elevate, and continue Ibuprofen for pain. He can have xray completed today. MRI ordered. Referred to ortho.    Orders:    XR knee 4+ vw left injury; Future    MRI knee left  wo contrast; Future    Ambulatory Referral to Orthopedic Surgery; Future    Injury of left knee, initial encounter    Orders:    Ambulatory Referral to Orthopedic Surgery; Future    Type 2 diabetes mellitus without complication, without long-term current use of insulin ([facility])    Lab Results   Component Value Date    HGBA1C 7.2 (A) 2025      Instructed to intensify diet-decrease carbs/sugars. Continue current medications. Return in 3 months.    Orders:    POCT hemoglobin A1c       History of Present Illness   Left knee injury.     Knee Pain   The incident occurred more than 1 week ago. The incident occurred at home (was carrying groceries went up stairs and knee twisted outward). The pain is present in the left knee. The pain is at a severity of 5/10 (5/10 in the morning, 10/10 at hte end of the day after work). The pain is moderate. The pain has been Constant since onset. Associated symptoms include a loss of motion. Pertinent negatives include no loss of sensation, muscle weakness, numbness or tingling. The symptoms are aggravated by movement and weight bearing. He has tried immobilization, ice and NSAIDs for the symptoms. The treatment provided mild relief.      Review of Systems   Constitutional:  Positive for activity change.   HENT: Negative.     Respiratory:  Negative for shortness of breath.    Gastrointestinal: Negative.    Genitourinary: Negative.    Musculoskeletal:  Positive for arthralgias and gait problem.   Neurological:  Negative for tingling and numbness.       Objective   /88 (BP Location: Left arm, Patient Position: Sitting, Cuff Size: Standard)   Pulse (!) 111   Temp 98.2 °F (36.8 °C) (Tympanic)   Wt 81.2 kg (179 lb)   SpO2 98%   BMI 26.43 kg/m²      Physical Exam  Vitals and nursing note reviewed.   Constitutional:       General: He is not in acute distress.     Appearance: Normal appearance. He is not ill-appearing.   HENT:      Head: Normocephalic.   Eyes:      Extraocular Movements: Extraocular movements intact.   Cardiovascular:      Rate and Rhythm: Normal rate and regular rhythm.      Heart sounds: Normal heart sounds.   Pulmonary:      Effort: Pulmonary effort is normal. No respiratory distress.      Breath sounds: Normal breath sounds. No wheezing.   Musculoskeletal:         General: Swelling and tenderness present.      Left knee: Swelling present. Tenderness present over the MCL. MCL laxity present.   Skin:     General: Skin is warm and dry.      Coloration: Skin is not pale.      Findings: No erythema.   Neurological:      Mental Status: He is alert and oriented to person, place, and time.   Psychiatric:         Mood and Affect: Mood normal.         Behavior: Behavior normal.

## 2025-03-25 NOTE — ASSESSMENT & PLAN NOTE
Lab Results   Component Value Date    HGBA1C 7.2 (A) 03/25/2025      Instructed to intensify diet-decrease carbs/sugars. Continue current medications. Return in 3 months.    Orders:    POCT hemoglobin A1c

## 2025-04-20 ENCOUNTER — HOSPITAL ENCOUNTER (OUTPATIENT)
Dept: MRI IMAGING | Facility: HOSPITAL | Age: 55
Discharge: HOME | End: 2025-04-20
Payer: COMMERCIAL

## 2025-04-20 DIAGNOSIS — M25.562 ACUTE PAIN OF LEFT KNEE: ICD-10-CM

## 2025-04-20 PROCEDURE — 73721 MRI JNT OF LWR EXTRE W/O DYE: CPT

## 2025-04-23 ENCOUNTER — PREP FOR PROCEDURE (OUTPATIENT)
Dept: OBGYN CLINIC | Facility: CLINIC | Age: 55
End: 2025-04-23

## 2025-04-23 ENCOUNTER — APPOINTMENT (OUTPATIENT)
Dept: RADIOLOGY | Facility: CLINIC | Age: 55
End: 2025-04-23
Attending: ORTHOPAEDIC SURGERY
Payer: COMMERCIAL

## 2025-04-23 ENCOUNTER — OFFICE VISIT (OUTPATIENT)
Dept: OBGYN CLINIC | Facility: CLINIC | Age: 55
End: 2025-04-23
Attending: NURSE PRACTITIONER
Payer: COMMERCIAL

## 2025-04-23 VITALS — WEIGHT: 184 LBS | BODY MASS INDEX: 27.25 KG/M2 | HEIGHT: 69 IN

## 2025-04-23 DIAGNOSIS — S89.92XA INJURY OF LEFT KNEE, INITIAL ENCOUNTER: ICD-10-CM

## 2025-04-23 DIAGNOSIS — M25.562 ACUTE PAIN OF LEFT KNEE: ICD-10-CM

## 2025-04-23 DIAGNOSIS — S83.242A ACUTE MEDIAL MENISCUS TEAR OF LEFT KNEE, INITIAL ENCOUNTER: Primary | ICD-10-CM

## 2025-04-23 PROCEDURE — 99204 OFFICE O/P NEW MOD 45 MIN: CPT | Performed by: ORTHOPAEDIC SURGERY

## 2025-04-23 PROCEDURE — 73560 X-RAY EXAM OF KNEE 1 OR 2: CPT

## 2025-04-23 RX ORDER — CHLORHEXIDINE GLUCONATE ORAL RINSE 1.2 MG/ML
15 SOLUTION DENTAL ONCE
OUTPATIENT
Start: 2025-05-05 | End: 2025-04-23

## 2025-04-23 RX ORDER — CEFAZOLIN SODIUM 2 G/50ML
2000 SOLUTION INTRAVENOUS ONCE
OUTPATIENT
Start: 2025-05-05 | End: 2025-04-23

## 2025-04-23 RX ORDER — CHLORHEXIDINE GLUCONATE 40 MG/ML
SOLUTION TOPICAL DAILY PRN
OUTPATIENT
Start: 2025-05-05

## 2025-04-23 NOTE — H&P (VIEW-ONLY)
Assessment:     1. Acute medial meniscus tear of left knee, initial encounter    2. Acute pain of left knee    3. Injury of left knee, initial encounter        Plan:     Problem List Items Addressed This Visit          Musculoskeletal and Integument    Acute medial meniscus tear of left knee - Primary    Findings consistent with left knee medial meniscal tear posterior horn with flipped fragment in medial gutter.  Left knee x-ray and MRI with radiologist report reviewed with patient in office. Prognosis of his condition reviewed. At this time surgical intervention recommended due to patient having mechanical symptoms with pain and giving away due to flipped meniscal fragment. Surgery will alleviate medial meniscal related pain. Arthroscopic partial medial meniscectomy reviewed and discussed in detail including risks and post op rehab. Until surgery avoid deep squats, pivoting or twisting motions, lateral movements.  Over-the-counter NSAID as needed for pain. Patient has crutches at home and will bring to surgery. Patient will follow up post operatively.  All patient's questions were answered to his satisfaction.  This note is created using dictation transcription.  It may contain typographical errors, grammatical errors, improperly dictated words, background noise and other errors.  Discussed with patient surgical risks and complications including but not limited to infection, persistent pain, nerve and vessel injury, blood loss, complications associated with anesthesia, DVT, exposure to COVID virus, etc.  Patient understands the risks and complication and consented to the surgery.         Relevant Orders    Case request operating room: ARTHROSCOPY KNEE WITH PARTIAL MEDIAL MENISCECTOMY (Completed)    Ambulatory referral to Family Practice    Basic metabolic panel    CBC and Platelet    Crutches       Surgery/Wound/Pain    Acute pain of left knee    Relevant Orders    XR knee 1 or 2 vw left     Other Visit Diagnoses          Injury of left knee, initial encounter        Relevant Orders    XR knee 1 or 2 vw left           Subjective:     Patient ID: Arthur Aly is a 54 y.o. male.  Chief Complaint:  54 yr old male in for evaluation of left knee pain. Referred by GISELL Rodriguez. Patient sustained twisting injury 4 weeks ago. He was walking up stairs with groceries and twisted knee. He did not have acute onset of pain. He got up seated position a few hrs later and had difficulty bending knee, standing due to pain medial aspect, sharp in nature. . Patient seen at urgent care and ED and does have MRI. Patient continues to note sharp acute pain medial aspect of knee with pivoting, twisting motions. He has difficult time bending and extending knee due to pain, swelling. Feels like his knee wants to give out. He is walking unassisted today.     Allergy:  Allergies   Allergen Reactions    Erythromycin Diarrhea and Abdominal Pain     Medications:  all current active meds have been reviewed  Past Medical History:  Past Medical History:   Diagnosis Date    Anxiety     Asthma     Depression     Diabetes mellitus (HCC)     Diverticulitis     Hyperlipemia     RESOLVED [id number]    Medial meniscus tear     LAST ASSESSED [id number]    Moderate single current episode of major depressive disorder (HCC) 03/11/2019    Psychiatric disorder     Psychiatric illness     Suicide attempt ([facility])      Past Surgical History:  Past Surgical History:   Procedure Laterality Date    ARTHROSCOPY KNEE Right     ONSET 7/3/2014    [place] LAPAROSCOPY COLECTOMY PARTIAL W/ANASTOMOSIS N/A 7/9/2019    Procedure: RESECTION COLON SIGMOID LAPAROSCOPIC WITH ANASTOMOSIS: INTRAOP COLONOSCOPY;  Surgeon: Malcolm Gutierrez MD;  Location:  MAIN OR;  Service: General    TONSILLECTOMY AND ADENOIDECTOMY      TOOTH EXTRACTION      WISDOM TEETH     Family History:  Family History   Problem Relation Age of Onset    Diabetes Mother     Hypertension Mother     Colon polyps Mother      "Prostate cancer Father     Substance Abuse Neg Hx     Mental illness Neg Hx      Social History:  Social History     Substance and Sexual Activity   Alcohol Use Yes    Alcohol/week: 3.0 standard drinks of alcohol    Types: 3 Cans of beer per week    Comment: couple beers a day     Social History     Substance and Sexual Activity   Drug Use Not Currently    Types: Marijuana    Comment: MEDICAL MARIJUANA CARD      Social History     Tobacco Use   Smoking Status Every Day    Current packs/day: 1.00    Average packs/day: 1 pack/day for 30.0 years (30.0 ttl pk-yrs)    Types: Cigarettes   Smokeless Tobacco Current     Review of Systems   Constitutional:  Negative for chills and fever.   HENT:  Negative for ear pain and sore throat.    Eyes:  Negative for pain and visual disturbance.   Respiratory:  Negative for cough and shortness of breath.    Cardiovascular:  Negative for chest pain and palpitations.   Gastrointestinal:  Negative for abdominal pain and vomiting.   Genitourinary:  Negative for dysuria and hematuria.   Musculoskeletal:  Positive for arthralgias (left knee), gait problem (Antalgic) and joint swelling (Left knee). Negative for back pain.   Skin:  Negative for color change and rash.   Neurological:  Negative for seizures and syncope.   Psychiatric/Behavioral: Negative.     All other systems reviewed and are negative.        Objective:  BP Readings from Last 1 Encounters:   03/25/25 130/88      Wt Readings from Last 1 Encounters:   04/23/25 83.5 kg (184 lb)      BMI:   Estimated body mass index is 27.17 kg/m² as calculated from the following:    Height as of this encounter: 5' 9\" (1.753 m).    Weight as of this encounter: 83.5 kg (184 lb).  BSA:   Estimated body surface area is 1.99 meters squared as calculated from the following:    Height as of this encounter: 5' 9\" (1.753 m).    Weight as of this encounter: 83.5 kg (184 lb).   Physical Exam  Vitals and nursing note reviewed.   Constitutional:       " Appearance: Normal appearance. He is well-developed.   HENT:      Head: Normocephalic and atraumatic.      Right Ear: External ear normal.      Left Ear: External ear normal.   Eyes:      Extraocular Movements: Extraocular movements intact.      Conjunctiva/sclera: Conjunctivae normal.      Pupils: Pupils are equal, round, and reactive to light.   Cardiovascular:      Rate and Rhythm: Normal rate and regular rhythm.      Pulses: Normal pulses.      Heart sounds: Normal heart sounds. No murmur heard.     No gallop.   Pulmonary:      Effort: Pulmonary effort is normal.      Breath sounds: Normal breath sounds. No wheezing or rales.   Abdominal:      Palpations: Abdomen is soft.   Musculoskeletal:         General: Tenderness (left knee arthralgia) present.      Cervical back: Normal range of motion and neck supple.      Left knee: Effusion (trace) present.      Instability Tests: Medial Maggie test positive. Lateral Maggie test negative.   Skin:     General: Skin is warm and dry.   Neurological:      Mental Status: He is alert and oriented to person, place, and time.      Deep Tendon Reflexes: Reflexes are normal and symmetric.   Psychiatric:         Mood and Affect: Mood normal.         Behavior: Behavior normal.         Thought Content: Thought content normal.         Judgment: Judgment normal.       Right Knee Exam     Range of Motion   Extension:  0   Flexion:  0       Left Knee Exam     Muscle Strength   The patient has normal left knee strength.    Tenderness   The patient is experiencing tenderness in the medial joint line.    Range of Motion   Extension:  0   Flexion:  120 (pain)     Tests   Maggie:  Medial - positive Lateral - negative  Varus: negative Valgus: negative  Patellar apprehension: negative    Other   Erythema: absent  Scars: absent  Sensation: normal  Pulse: present  Swelling: mild  Effusion: effusion (trace) present            I have personally reviewed pertinent films in PACS and my  interpretation is x-rays left knee show good joint alignments.  No soft tissue calcification or DJD.  MRI left knee horizontal medial meniscus tear posterior horn with inferior flipped fragment in medial gutter, grade I MCL sprain.    Scribe Attestation      I,:  Eugenio Gutierrez am acting as a scribe while in the presence of the attending physician.:       I,:  Dimas Nicole MD personally performed the services described in this documentation    as scribed in my presence.:

## 2025-04-23 NOTE — ASSESSMENT & PLAN NOTE
Findings consistent with left knee medial meniscal tear posterior horn with flipped fragment in medial gutter.  Left knee x-ray and MRI with radiologist report reviewed with patient in office. Prognosis of his condition reviewed. At this time surgical intervention recommended due to patient having mechanical symptoms with pain and giving away due to flipped meniscal fragment. Surgery will alleviate medial meniscal related pain. Arthroscopic partial medial meniscectomy reviewed and discussed in detail including risks and post op rehab. Until surgery avoid deep squats, pivoting or twisting motions, lateral movements.  Over-the-counter NSAID as needed for pain. Patient has crutches at home and will bring to surgery. Patient will follow up post operatively.  All patient's questions were answered to his satisfaction.  This note is created using dictation transcription.  It may contain typographical errors, grammatical errors, improperly dictated words, background noise and other errors.  Discussed with patient surgical risks and complications including but not limited to infection, persistent pain, nerve and vessel injury, blood loss, complications associated with anesthesia, DVT, exposure to COVID virus, etc.  Patient understands the risks and complication and consented to the surgery.

## 2025-04-23 NOTE — PROGRESS NOTES
Assessment:     1. Acute medial meniscus tear of left knee, initial encounter    2. Acute pain of left knee    3. Injury of left knee, initial encounter        Plan:     Problem List Items Addressed This Visit          Musculoskeletal and Integument    Acute medial meniscus tear of left knee - Primary    Findings consistent with left knee medial meniscal tear posterior horn with flipped fragment in medial gutter.  Left knee x-ray and MRI with radiologist report reviewed with patient in office. Prognosis of his condition reviewed. At this time surgical intervention recommended due to patient having mechanical symptoms with pain and giving away due to flipped meniscal fragment. Surgery will alleviate medial meniscal related pain. Arthroscopic partial medial meniscectomy reviewed and discussed in detail including risks and post op rehab. Until surgery avoid deep squats, pivoting or twisting motions, lateral movements.  Over-the-counter NSAID as needed for pain. Patient has crutches at home and will bring to surgery. Patient will follow up post operatively.  All patient's questions were answered to his satisfaction.  This note is created using dictation transcription.  It may contain typographical errors, grammatical errors, improperly dictated words, background noise and other errors.  Discussed with patient surgical risks and complications including but not limited to infection, persistent pain, nerve and vessel injury, blood loss, complications associated with anesthesia, DVT, exposure to COVID virus, etc.  Patient understands the risks and complication and consented to the surgery.         Relevant Orders    Case request operating room: ARTHROSCOPY KNEE WITH PARTIAL MEDIAL MENISCECTOMY (Completed)    Ambulatory referral to Family Practice    Basic metabolic panel    CBC and Platelet    Crutches       Surgery/Wound/Pain    Acute pain of left knee    Relevant Orders    XR knee 1 or 2 vw left     Other Visit Diagnoses          Injury of left knee, initial encounter        Relevant Orders    XR knee 1 or 2 vw left           Subjective:     Patient ID: Arthur Aly is a 54 y.o. male.  Chief Complaint:  54 yr old male in for evaluation of left knee pain. Referred by GISELL Rodriguez. Patient sustained twisting injury 4 weeks ago. He was walking up stairs with groceries and twisted knee. He did not have acute onset of pain. He got up seated position a few hrs later and had difficulty bending knee, standing due to pain medial aspect, sharp in nature. . Patient seen at urgent care and ED and does have MRI. Patient continues to note sharp acute pain medial aspect of knee with pivoting, twisting motions. He has difficult time bending and extending knee due to pain, swelling. Feels like his knee wants to give out. He is walking unassisted today.     Allergy:  Allergies   Allergen Reactions    Erythromycin Diarrhea and Abdominal Pain     Medications:  all current active meds have been reviewed  Past Medical History:  Past Medical History:   Diagnosis Date    Anxiety     Asthma     Depression     Diabetes mellitus (HCC)     Diverticulitis     Hyperlipemia     RESOLVED [id number]    Medial meniscus tear     LAST ASSESSED [id number]    Moderate single current episode of major depressive disorder (HCC) 03/11/2019    Psychiatric disorder     Psychiatric illness     Suicide attempt ([facility])      Past Surgical History:  Past Surgical History:   Procedure Laterality Date    ARTHROSCOPY KNEE Right     ONSET 7/3/2014    [place] LAPAROSCOPY COLECTOMY PARTIAL W/ANASTOMOSIS N/A 7/9/2019    Procedure: RESECTION COLON SIGMOID LAPAROSCOPIC WITH ANASTOMOSIS: INTRAOP COLONOSCOPY;  Surgeon: Malcolm Gutierrez MD;  Location:  MAIN OR;  Service: General    TONSILLECTOMY AND ADENOIDECTOMY      TOOTH EXTRACTION      WISDOM TEETH     Family History:  Family History   Problem Relation Age of Onset    Diabetes Mother     Hypertension Mother     Colon polyps Mother      "Prostate cancer Father     Substance Abuse Neg Hx     Mental illness Neg Hx      Social History:  Social History     Substance and Sexual Activity   Alcohol Use Yes    Alcohol/week: 3.0 standard drinks of alcohol    Types: 3 Cans of beer per week    Comment: couple beers a day     Social History     Substance and Sexual Activity   Drug Use Not Currently    Types: Marijuana    Comment: MEDICAL MARIJUANA CARD      Social History     Tobacco Use   Smoking Status Every Day    Current packs/day: 1.00    Average packs/day: 1 pack/day for 30.0 years (30.0 ttl pk-yrs)    Types: Cigarettes   Smokeless Tobacco Current     Review of Systems   Constitutional:  Negative for chills and fever.   HENT:  Negative for ear pain and sore throat.    Eyes:  Negative for pain and visual disturbance.   Respiratory:  Negative for cough and shortness of breath.    Cardiovascular:  Negative for chest pain and palpitations.   Gastrointestinal:  Negative for abdominal pain and vomiting.   Genitourinary:  Negative for dysuria and hematuria.   Musculoskeletal:  Positive for arthralgias (left knee), gait problem (Antalgic) and joint swelling (Left knee). Negative for back pain.   Skin:  Negative for color change and rash.   Neurological:  Negative for seizures and syncope.   Psychiatric/Behavioral: Negative.     All other systems reviewed and are negative.        Objective:  BP Readings from Last 1 Encounters:   03/25/25 130/88      Wt Readings from Last 1 Encounters:   04/23/25 83.5 kg (184 lb)      BMI:   Estimated body mass index is 27.17 kg/m² as calculated from the following:    Height as of this encounter: 5' 9\" (1.753 m).    Weight as of this encounter: 83.5 kg (184 lb).  BSA:   Estimated body surface area is 1.99 meters squared as calculated from the following:    Height as of this encounter: 5' 9\" (1.753 m).    Weight as of this encounter: 83.5 kg (184 lb).   Physical Exam  Vitals and nursing note reviewed.   Constitutional:       " Appearance: Normal appearance. He is well-developed.   HENT:      Head: Normocephalic and atraumatic.      Right Ear: External ear normal.      Left Ear: External ear normal.   Eyes:      Extraocular Movements: Extraocular movements intact.      Conjunctiva/sclera: Conjunctivae normal.      Pupils: Pupils are equal, round, and reactive to light.   Cardiovascular:      Rate and Rhythm: Normal rate and regular rhythm.      Pulses: Normal pulses.      Heart sounds: Normal heart sounds. No murmur heard.     No gallop.   Pulmonary:      Effort: Pulmonary effort is normal.      Breath sounds: Normal breath sounds. No wheezing or rales.   Abdominal:      Palpations: Abdomen is soft.   Musculoskeletal:         General: Tenderness (left knee arthralgia) present.      Cervical back: Normal range of motion and neck supple.      Left knee: Effusion (trace) present.      Instability Tests: Medial Maggie test positive. Lateral Maggie test negative.   Skin:     General: Skin is warm and dry.   Neurological:      Mental Status: He is alert and oriented to person, place, and time.      Deep Tendon Reflexes: Reflexes are normal and symmetric.   Psychiatric:         Mood and Affect: Mood normal.         Behavior: Behavior normal.         Thought Content: Thought content normal.         Judgment: Judgment normal.       Right Knee Exam     Range of Motion   Extension:  0   Flexion:  0       Left Knee Exam     Muscle Strength   The patient has normal left knee strength.    Tenderness   The patient is experiencing tenderness in the medial joint line.    Range of Motion   Extension:  0   Flexion:  120 (pain)     Tests   Maggie:  Medial - positive Lateral - negative  Varus: negative Valgus: negative  Patellar apprehension: negative    Other   Erythema: absent  Scars: absent  Sensation: normal  Pulse: present  Swelling: mild  Effusion: effusion (trace) present            I have personally reviewed pertinent films in PACS and my  interpretation is x-rays left knee show good joint alignments.  No soft tissue calcification or DJD.  MRI left knee horizontal medial meniscus tear posterior horn with inferior flipped fragment in medial gutter, grade I MCL sprain.    Scribe Attestation      I,:  Eugenio Gutierrez am acting as a scribe while in the presence of the attending physician.:       I,:  Dimas Nicole MD personally performed the services described in this documentation    as scribed in my presence.:

## 2025-04-25 LAB
BUN SERPL-MCNC: 16 MG/DL (ref 6–24)
BUN/CREAT SERPL: 20 (ref 9–20)
CALCIUM SERPL-MCNC: 9.3 MG/DL (ref 8.7–10.2)
CHLORIDE SERPL-SCNC: 101 MMOL/L (ref 96–106)
CO2 SERPL-SCNC: 18 MMOL/L (ref 20–29)
CREAT SERPL-MCNC: 0.81 MG/DL (ref 0.76–1.27)
EGFR: 104 ML/MIN/1.73
ERYTHROCYTE [DISTWIDTH] IN BLOOD BY AUTOMATED COUNT: 12.4 % (ref 11.6–15.4)
GLUCOSE SERPL-MCNC: 149 MG/DL (ref 70–99)
HCT VFR BLD AUTO: 46.2 % (ref 37.5–51)
HGB BLD-MCNC: 15.4 G/DL (ref 13–17.7)
MCH RBC QN AUTO: 32.4 PG (ref 26.6–33)
MCHC RBC AUTO-ENTMCNC: 33.3 G/DL (ref 31.5–35.7)
MCV RBC AUTO: 97 FL (ref 79–97)
PLATELET # BLD AUTO: 291 X10E3/UL (ref 150–450)
POTASSIUM SERPL-SCNC: 4.4 MMOL/L (ref 3.5–5.2)
RBC # BLD AUTO: 4.76 X10E6/UL (ref 4.14–5.8)
SODIUM SERPL-SCNC: 137 MMOL/L (ref 134–144)
WBC # BLD AUTO: 4.7 X10E3/UL (ref 3.4–10.8)

## 2025-04-28 ENCOUNTER — CONSULT (OUTPATIENT)
Dept: FAMILY MEDICINE CLINIC | Facility: CLINIC | Age: 55
End: 2025-04-28
Payer: COMMERCIAL

## 2025-04-28 VITALS
SYSTOLIC BLOOD PRESSURE: 119 MMHG | OXYGEN SATURATION: 98 % | DIASTOLIC BLOOD PRESSURE: 85 MMHG | WEIGHT: 180 LBS | TEMPERATURE: 97.9 F | BODY MASS INDEX: 26.58 KG/M2 | HEART RATE: 119 BPM

## 2025-04-28 DIAGNOSIS — E11.9 TYPE 2 DIABETES MELLITUS WITHOUT COMPLICATION, WITHOUT LONG-TERM CURRENT USE OF INSULIN (HCC): ICD-10-CM

## 2025-04-28 DIAGNOSIS — S83.242A ACUTE MEDIAL MENISCUS TEAR OF LEFT KNEE, INITIAL ENCOUNTER: ICD-10-CM

## 2025-04-28 DIAGNOSIS — Z01.818 PRE-OP EXAM: Primary | ICD-10-CM

## 2025-04-28 PROCEDURE — 99214 OFFICE O/P EST MOD 30 MIN: CPT | Performed by: NURSE PRACTITIONER

## 2025-04-28 RX ORDER — NAPROXEN 500 MG/1
500 TABLET ORAL 2 TIMES DAILY WITH MEALS
COMMUNITY

## 2025-04-28 NOTE — PATIENT INSTRUCTIONS
Patient is medically cleared for surgery.      Pre-operative Medication Instructions    Avoid herbs or non-directed vitamins one week prior to surgery  Avoid aspirin containing medications or non-steroidal anti-inflammatory drugs one week preceding surgery  May take tylenol for pain up until the night before surgery    ACE Inhibitors or ARBs       Medication Name     lisinopril (ZESTRIL) 20 mg tablet        Continue this medication up to the evening before surgery/procedure, but do not take the morning of the day of surgery.      Continue to take this medication on your normal schedule.  If this is an oral medication and you take it in the morning, then you may take this medicine with a sip of water.      If this medication is needed please continue to take on your normal schedule.  If you take it in the morning, then you may take this medicine with a sip of water.    Cholesterol lowering meds       Medication Name     atorvastatin (LIPITOR) 10 mg tablet        Continue to take this medication on your normal schedule.  If this is an oral medication and you take it in the morning, then you may take this medicine with a sip of water.      Continue to take this medication on your normal schedule.  If this is an oral medication and you take it in the morning, then you may take this medicine with a sip of water.    Diabetic Medications       Medication Name     Empagliflozin 25 MG TABS     glipiZIDE (GLUCOTROL) 5 mg tablet     metFORMIN (GLUCOPHAGE-XR) 500 mg 24 hr tablet          Medicine Instructions for Adults with Diabetes (NO Bowel Prep)    Follow these instructions when a BOWEL PREP is NOT required for your procedure or surgery!    NOTE:  GLP Agonists taken weekly: do not take in the 7 days before your procedure. **Bariatric surgery: do not take 4 weeks prior to your procedure.    SGLT-2 Inhibitors: do not take in the 4 days before your procedure    On the Day Before Surgery/Procedure  If you are having a procedure  (e.g., Colonoscopy) or surgery which DOES NOT require a bowel prep, follow the directions below based on the type of medicine you take for your diabetes.  Type of Medicine You Take Examples What to Do   Pre-Mixed Insulin Intermediate  Gmrhcin96/25, Gtktopy76/30, Novolog 70/30, Regular Insulin Take 1/2 your regular dose the evening before our procedure.   Rapid/Fast Acting  Insulin and/or Long-Acting Insulin Humalog U200, NovoLog, Apidra,  Lantus, Levemir, Tresiba, Toujeo,  Fias, Basaglar Take your FULL regular dose the day before procedure.   Oral Diabetic Medicines (sulfonylurea) Glipizide/Glimepiride/  Glucotrol Take your regular dose with dinner the evening before your procedure.   Other Oral Diabetic Medicines Metformin, Glucophage, Glucophage  XR, Riomet, Glumetza, Actose,  Avandia, Gl set, Prandin Take your regular dose with dinner the evening before your procedure   GLP Agonists Adlyxin, Byetta, Bydureon,  Ozempic, Soliqua, Tanzeum,  Trulicity, Victoza, Saxenda,  Rybelsus, Wegovy, Mounjaro, Zepbound If taken daily, take as normal  If taken weekly, do not take this medicine for 7 days before your procedure including the day of the procedure (resume taking after the procedure). **Bariatric surgery: do not take 4 weeks prior to procedure   SGLT-2 Inhibitors Jardiance, Invokana, Farxiga, Steglatro, Brenzavvy, Qtern, Segluromet Glyxambi, Synjardy, Synjardy XR, Invokamet, InvokametXR, Trijary XR, Xigduo X Do not take for 4 days before your procedure including the day of the procedure (resume taking after the procedure)   This educational material has been approved by the Patient Education Advisory Committee.    On the Day of Surgery/Procedure  Follow the directions below based on the type of medicine you take for your diabetes.  Type of Medicine You Take  Examples What to Do   Long-Acting Insulin Lantus, Levemir, Tresiba,  Toujeo, Basaglar, Semglee If you normally take your Long Acting Insulin in the morning, take  the full dose as scheduled.   GLP-I Agonists Adlyxin, Byetta, Bydureon,  Ozempic, Soliqua, Tanzeum,  Trulicity, Victoza, Saxenda,  Rybelsus, Mounjaro Do NOT take this medicine on the day of your procedure (resume taking after the procedure)   Except for the morning Long-Acting Insulin, DO NOT take ANY diabetic medicine on the day of your procedure unless you were instructed by the doctor who manages your diabetes medicines.  Continue to check your blood sugars.  If you have an insulin pump, ask your endocrinologist for instructions at least 3 days before your procedure. NOTE: If you are not able to ask your endocrinologist in advance, on the day of the procedure set your insulin pump to your basal rate only. Bring your insulin pump supplies to the hospital.    If you have any questions about taking your diabetes medicines prior to your procedure, please contact the doctor who manages your diabetes medicines.

## 2025-04-28 NOTE — PROGRESS NOTES
Pre-operative Clearance  Name: Arthur Aly      : 1970      MRN: [id number]  Encounter Provider: GISELL Xavier  Encounter Date: 2025   Encounter department: [facility]    :  Assessment & Plan  Pre-op exam  Patient is doing well. No concerns. Scheduled for left knee arthroscopy with partial meniscectomy on 25 with Dr. Nicole.  Labs and medications reviewed.  Pre-op physical completed. Patient is medically cleared for surgery.        Acute medial meniscus tear of left knee, initial encounter    Orders:    Ambulatory referral to [facility]    Type 2 diabetes mellitus without complication, without long-term current use of insulin ([facility])    Lab Results   Component Value Date    HGBA1C 7.2 (A) 2025     A1c 7.2. Continue current medications. Stable for surgery.                        Pre-operative Clearance:     Revised Cardiac Risk Index:  RCI RISK CLASS I (0 risk factors, risk of major cardiac complications approximately 0.5%)    Clearance:  Patient is medically optimized (CLEARED) for proposed surgery without any additional cardiac testing.      Medication Instructions:   - Avoid herbs or non-directed vitamins one week prior to surgery    - Avoid aspirin containing medications or non-steroidal anti-inflammatory drugs one week preceding surgery    - May take tylenol for pain up until the night before surgery    - ACE Inhibitors or ARBs: Continue this medication up to the evening before surgery/procedure, but do not take the morning of the day of surgery.  - Antiepileptic meds: Continue to take this medication on your normal schedule.  - Benzodiazepines (ie, alprazolam, lorazepam, diazepam):  If the medication is needed, continue to take it on your normal schedule.  - Hyperlipidemia meds: Continue to take this medication on your normal schedule.      Medicine Instructions for Adults with Diabetes (NO Bowel Prep)    Follow these instructions when a BOWEL PREP  is NOT required for your procedure or surgery!    NOTE:  GLP Agonists taken weekly: do not take in the 7 days before your procedure. **Bariatric surgery: do not take 4 weeks prior to your procedure.    SGLT-2 Inhibitors: do not take in the 4 days before your procedure    On the Day Before Surgery/Procedure  If you are having a procedure (e.g., Colonoscopy) or surgery which DOES NOT require a bowel prep, follow the directions below based on the type of medicine you take for your diabetes.  Type of Medicine You Take Examples What to Do   Pre-Mixed Insulin Intermediate  Peopemj59/25, Evqjtxd87/30, Novolog 70/30, Regular Insulin Take 1/2 your regular dose the evening before our procedure.   Rapid/Fast Acting  Insulin and/or Long-Acting Insulin Humalog U200, NovoLog, Apidra,  Lantus, Levemir, Tresiba, Toujeo,  Fias, Basaglar Take your FULL regular dose the day before procedure.   Oral Diabetic Medicines (sulfonylurea) Glipizide/Glimepiride/  Glucotrol Take your regular dose with dinner the evening before your procedure.   Other Oral Diabetic Medicines Metformin, Glucophage, Glucophage  XR, Riomet, Glumetza, Actose,  Avandia, Gl set, Prandin Take your regular dose with dinner the evening before your procedure   GLP Agonists Adlyxin, Byetta, Bydureon,  Ozempic, Soliqua, Tanzeum,  Trulicity, Victoza, Saxenda,  Rybelsus, Wegovy, Mounjaro, Zepbound If taken daily, take as normal  If taken weekly, do not take this medicine for 7 days before your procedure including the day of the procedure (resume taking after the procedure). **Bariatric surgery: do not take 4 weeks prior to procedure   SGLT-2 Inhibitors Jardiance, Invokana, Farxiga, Steglatro, Brenzavvy, Qtern, Segluromet Glyxambi, Synjardy, Synjardy XR, Invokamet, InvokametXR, Trijary XR, Xigduo X Do not take for 4 days before your procedure including the day of the procedure (resume taking after the procedure)   This educational material has been approved by the Patient  Education Advisory Committee.    On the Day of Surgery/Procedure  Follow the directions below based on the type of medicine you take for your diabetes.  Type of Medicine You Take  Examples What to Do   Long-Acting Insulin Lantus, Levemir, Tresiba,  Toujeo, Basaglar, Semglee If you normally take your Long Acting Insulin in the morning, take the full dose as scheduled.   GLP-I Agonists Adlyxin, Byetta, Bydureon,  Ozempic, Soliqua, Tanzeum,  Trulicity, Victoza, Saxenda,  Rybelsus, Mounjaro Do NOT take this medicine on the day of your procedure (resume taking after the procedure)   Except for the morning Long-Acting Insulin, DO NOT take ANY diabetic medicine on the day of your procedure unless you were instructed by the doctor who manages your diabetes medicines.  Continue to check your blood sugars.  If you have an insulin pump, ask your endocrinologist for instructions at least 3 days before your procedure. NOTE: If you are not able to ask your endocrinologist in advance, on the day of the procedure set your insulin pump to your basal rate only. Bring your insulin pump supplies to the hospital.        Depression Screening and Follow-up Plan: Patient was screened for depression during today's encounter. They screened negative with a PHQ-2 score of 0.      Tobacco Cessation Counseling: Tobacco cessation counseling was provided. The patient is sincerely urged to quit consumption of tobacco. He is not ready to quit tobacco.       History of Present Illness     Pre-Op Examination     Surgery: left knee arthroscopy with partial meniscectomy   Anticipated Date of Surgery: 5/5/25   Surgeon: Dr. Nicole     Patient presents for pre-op clearance. No current problems/concerns. Labs and medications reviewed.     Previous history of bleeding disorders or clots?: No    Previous Anesthesia reaction?: No    Prolonged steroid use in the last 6 months?: No      Assessment of Cardiac Risk:   - Unstable or severe angina or MI in the last 6  weeks or history of stent placement in the last year?: No    - Decompensated heart failure (e.g. New onset heart failure, NYHA  Class IV heart failure, or worsening existing heart failure)?: No    - Significant arrhythmias such as high grade AV block, symptomatic ventricular arrhythmia, newly recognized ventricular tachycardia, supraventricular tachycardia with resting heart rate >100, or symptomatic bradycardia?: No    - Severe heart valve disease including aortic stenosis or symptomatic mitral stenosis?: No      Pre-operative Risk Factors:  - Elevated-risk surgery: No    - History of cerebrovascular disease: No    - History of ischemic heart disease: No    - History of congestive heart failure: No    - Pre-operative treatment with insulin: No    - Pre-operative creatinine >2 mg/dL: No      Review of Systems   Constitutional:  Negative for activity change, diaphoresis, fatigue and fever.   HENT: Negative.     Eyes: Negative.    Respiratory:  Negative for cough, chest tightness, shortness of breath and wheezing.    Cardiovascular:  Negative for chest pain, palpitations and leg swelling.   Gastrointestinal:  Negative for abdominal pain, nausea and vomiting.   Endocrine: Negative.    Genitourinary:  Negative for decreased urine volume, difficulty urinating and flank pain.   Musculoskeletal:  Positive for arthralgias (left javid pain).   Skin:  Negative for color change and pallor.   Neurological:  Negative for dizziness, tremors, facial asymmetry, numbness and headaches.   Hematological:  Negative for adenopathy. Does not bruise/bleed easily.   Psychiatric/Behavioral:  Negative for dysphoric mood.      Past Medical History   Past Medical History:   Diagnosis Date    Anxiety     Asthma     Depression     Diabetes mellitus (HCC)     Diverticulitis     Hyperlipemia     RESOLVED [id number]    Medial meniscus tear     LAST ASSESSED [id number]    Moderate single current episode of major depressive disorder (HCC) 03/11/2019     Psychiatric disorder     Psychiatric illness     Suicide attempt (HCC)      Past Surgical History:   Procedure Laterality Date    ARTHROSCOPY KNEE Right     ONSET 7/3/2014    NV LAPAROSCOPY COLECTOMY PARTIAL W/ANASTOMOSIS N/A 7/9/2019    Procedure: RESECTION COLON SIGMOID LAPAROSCOPIC WITH ANASTOMOSIS: INTRAOP COLONOSCOPY;  Surgeon: Malcolm Gutierrez MD;  Location: [facility];  Service: General    TONSILLECTOMY AND ADENOIDECTOMY      TOOTH EXTRACTION      WISDOM TEETH     Family History   Problem Relation Age of Onset    Diabetes Mother     Hypertension Mother     Colon polyps Mother     Prostate cancer Father     Substance Abuse Neg Hx     Mental illness Neg Hx      Social History     Tobacco Use    Smoking status: Every Day     Current packs/day: 1.00     Average packs/day: 1 pack/day for 30.0 years (30.0 ttl pk-yrs)     Types: Cigarettes    Smokeless tobacco: Current   Vaping Use    Vaping status: Every Day    Substances: Nicotine, CBD, Flavoring   Substance and Sexual Activity    Alcohol use: Yes     Alcohol/week: 3.0 standard drinks of alcohol     Types: 3 Cans of beer per week     Comment: couple beers a day    Drug use: Not Currently     Types: Marijuana     Comment: MEDICAL MARIJUANA CARD     Sexual activity: Not Currently     Partners: Female     Birth control/protection: None     Current Outpatient Medications on File Prior to Visit   Medication Sig    atorvastatin (LIPITOR) 10 mg tablet Take 1 tablet by mouth once daily    clonazePAM (KlonoPIN) 2 mg tablet Take 1 tablet by mouth twice daily    Empagliflozin 25 MG TABS Take 1 tablet (25 mg total) by mouth daily    glipiZIDE (GLUCOTROL) 5 mg tablet Take 1 tablet (5 mg total) by mouth in the morning    lisinopril (ZESTRIL) 20 mg tablet Take 1 tablet (20 mg total) by mouth daily    metFORMIN (GLUCOPHAGE-XR) 500 mg 24 hr tablet TAKE 1 TABLET BY MOUTH TWICE DAILY WITH MEALS    triamcinolone (KENALOG) 0.5 % ointment Apply topically 2 (two) times a day     naproxen (NAPROSYN) 500 mg tablet Take 500 mg by mouth 2 (two) times a day with meals    [DISCONTINUED] melatonin 3 mg Take 3-6mg by mouth daily at bedtime as needed for insomnia (Patient not taking: Reported on 3/25/2025)    [DISCONTINUED] oxyCODONE (Roxicodone) 5 immediate release tablet Take 1 tablet (5 mg total) by mouth every 4 (four) hours as needed for moderate pain Max Daily Amount: 30 mg (Patient not taking: Reported on 4/28/2025)     Allergies   Allergen Reactions    Erythromycin Diarrhea and Abdominal Pain     Objective   /85 (BP Location: Left arm, Patient Position: Sitting, Cuff Size: Standard)   Pulse (!) 119   Temp 97.9 °F (36.6 °C) (Tympanic)   Wt 81.6 kg (180 lb)   SpO2 98%   BMI 26.58 kg/m²     Physical Exam  Vitals and nursing note reviewed.   Constitutional:       General: He is not in acute distress.     Appearance: Normal appearance. He is not ill-appearing, toxic-appearing or diaphoretic.   HENT:      Head: Normocephalic.      Right Ear: Tympanic membrane, ear canal and external ear normal. There is no impacted cerumen.      Left Ear: Tympanic membrane, ear canal and external ear normal. There is no impacted cerumen.      Nose: Nose normal.      Mouth/Throat:      Mouth: Mucous membranes are moist.      Pharynx: Oropharynx is clear. No oropharyngeal exudate or posterior oropharyngeal erythema.   Eyes:      Extraocular Movements: Extraocular movements intact.   Cardiovascular:      Rate and Rhythm: Normal rate and regular rhythm.      Heart sounds: Normal heart sounds.   Pulmonary:      Effort: Pulmonary effort is normal. No respiratory distress.      Breath sounds: Normal breath sounds. No stridor. No wheezing, rhonchi or rales.   Chest:      Chest wall: No tenderness.   Abdominal:      General: Bowel sounds are normal. There is no distension.      Palpations: Abdomen is soft.      Tenderness: There is no abdominal tenderness.   Musculoskeletal:         General: Tenderness (left  knee) present.      Cervical back: Normal range of motion and neck supple. No tenderness.   Lymphadenopathy:      Cervical: No cervical adenopathy.   Skin:     General: Skin is warm and dry.      Coloration: Skin is not pale.      Findings: No erythema.   Neurological:      General: No focal deficit present.      Mental Status: He is alert and oriented to person, place, and time.   Psychiatric:         Mood and Affect: Mood normal.         Behavior: Behavior normal.         Thought Content: Thought content normal.         Judgment: Judgment normal.       Administrative Statements   I have spent a total time of 30 minutes in caring for this patient on the day of the visit/encounter including Diagnostic results, Instructions for management, Patient and family education, Importance of tx compliance, Documenting in the medical record, Reviewing/placing orders in the medical record (including tests, medications, and/or procedures), and Obtaining or reviewing history  .    GISELL Xavier

## 2025-04-28 NOTE — ASSESSMENT & PLAN NOTE
Patient is doing well. No concerns. Scheduled for left knee arthroscopy with partial meniscectomy on 5/5/25 with Dr. Nicole.  Labs and medications reviewed.  Pre-op physical completed. Patient is medically cleared for surgery.

## 2025-04-28 NOTE — ASSESSMENT & PLAN NOTE
Lab Results   Component Value Date    HGBA1C 7.2 (A) 03/25/2025     A1c 7.2. Continue current medications. Stable for surgery.

## 2025-05-01 DIAGNOSIS — E11.9 TYPE 2 DIABETES MELLITUS WITHOUT COMPLICATION, WITHOUT LONG-TERM CURRENT USE OF INSULIN (HCC): ICD-10-CM

## 2025-05-01 RX ORDER — GLIPIZIDE 5 MG/1
TABLET ORAL
Qty: 180 TABLET | Refills: 1 | Status: SHIPPED | OUTPATIENT
Start: 2025-05-01

## 2025-05-01 RX ORDER — METFORMIN HYDROCHLORIDE 750 MG/1
750 TABLET, EXTENDED RELEASE ORAL 2 TIMES DAILY
COMMUNITY

## 2025-05-01 RX ORDER — ACETAMINOPHEN 500 MG
500-1000 TABLET ORAL EVERY 6 HOURS PRN
COMMUNITY

## 2025-05-01 NOTE — PRE-PROCEDURE INSTRUCTIONS
Pre-Surgery Instructions:   Medication Instructions    atorvastatin (LIPITOR) 10 mg tablet Take day of surgery.    clonazePAM (KlonoPIN) 2 mg tablet Take day of surgery.    Empagliflozin 25 MG TABS Stop taking 4 days prior to surgery.    glipiZIDE (GLUCOTROL) 5 mg tablet Hold day of surgery.    lisinopril (ZESTRIL) 20 mg tablet Hold day of surgery.    metFORMIN (GLUCOPHAGE-XR) 750 mg 24 hr tablet Hold day of surgery.    naproxen (NAPROSYN) 500 mg tablet Instructions provided by MD   Medication instructions for day of surgery reviewed. Please take all instructed medications with only a sip of water.       You will receive a call one business day prior to surgery with an arrival time and hospital directions. If your surgery is scheduled on a Monday, the hospital will be calling you on the Friday prior to your surgery. If you have not heard from anyone by 8pm, please call the hospital supervisor through the hospital  at [phone]. ([place] [phone] or [place] [phone]).    Do not eat or drink anything after midnight the night before your surgery, including candy, mints, lifesavers, or chewing gum. Do not drink alcohol 24hrs before your surgery. Try not to smoke at least 24hrs before your surgery.       Follow the pre surgery showering instructions as listed in the “My Surgical Experience Booklet” or otherwise provided by your surgeon's office. Do not use a blade to shave the surgical area 1 week before surgery. It is okay to use a clean electric clippers up to 24 hours before surgery. Do not apply any lotions, creams, including makeup, cologne, deodorant, or perfumes after showering on the day of your surgery. Do not use dry shampoo, hair spray, hair gel, or any type of hair products.     No contact lenses, eye make-up, or artificial eyelashes. Remove nail polish, including gel polish, and any artificial, gel, or acrylic nails if possible. Remove all jewelry including rings and body piercing  jewelry.     Wear causal clothing that is easy to take on and off. Consider your type of surgery.    Keep any valuables, jewelry, piercings at home. Please bring any specially ordered equipment (sling, braces) if indicated.    Arrange for a responsible person to drive you to and from the hospital on the day of your surgery. Please confirm the visitor policy for the day of your procedure when you receive your phone call with an arrival time.     Call the surgeon's office with any new illnesses, exposures, or additional questions prior to surgery.    Please reference your “My Surgical Experience Booklet” for additional information to prepare for your upcoming surgery.

## 2025-05-02 DIAGNOSIS — Z91.89 AT RISK FOR SLEEP APNEA: Primary | ICD-10-CM

## 2025-05-05 ENCOUNTER — HOSPITAL ENCOUNTER (OUTPATIENT)
Facility: HOSPITAL | Age: 55
Setting detail: OUTPATIENT SURGERY
Discharge: HOME | End: 2025-05-05
Attending: ORTHOPAEDIC SURGERY | Admitting: ORTHOPAEDIC SURGERY
Payer: COMMERCIAL

## 2025-05-05 ENCOUNTER — ANESTHESIA (OUTPATIENT)
Dept: PERIOP | Facility: HOSPITAL | Age: 55
End: 2025-05-05
Payer: COMMERCIAL

## 2025-05-05 ENCOUNTER — ANESTHESIA EVENT (OUTPATIENT)
Dept: PERIOP | Facility: HOSPITAL | Age: 55
End: 2025-05-05
Payer: COMMERCIAL

## 2025-05-05 VITALS
RESPIRATION RATE: 18 BRPM | HEIGHT: 69 IN | OXYGEN SATURATION: 96 % | SYSTOLIC BLOOD PRESSURE: 146 MMHG | BODY MASS INDEX: 26.16 KG/M2 | DIASTOLIC BLOOD PRESSURE: 87 MMHG | WEIGHT: 176.6 LBS | TEMPERATURE: 97.3 F | HEART RATE: 78 BPM

## 2025-05-05 DIAGNOSIS — S83.242D ACUTE MEDIAL MENISCUS TEAR OF LEFT KNEE, SUBSEQUENT ENCOUNTER: Primary | ICD-10-CM

## 2025-05-05 LAB — GLUCOSE SERPL-MCNC: 176 MG/DL (ref 65–140)

## 2025-05-05 PROCEDURE — 82948 REAGENT STRIP/BLOOD GLUCOSE: CPT

## 2025-05-05 PROCEDURE — 29881 ARTHRS KNE SRG MNISECTMY M/L: CPT | Performed by: PHYSICIAN ASSISTANT

## 2025-05-05 PROCEDURE — 29881 ARTHRS KNE SRG MNISECTMY M/L: CPT | Performed by: ORTHOPAEDIC SURGERY

## 2025-05-05 RX ORDER — DIPHENHYDRAMINE HYDROCHLORIDE 50 MG/ML
12.5 INJECTION, SOLUTION INTRAMUSCULAR; INTRAVENOUS ONCE AS NEEDED
Status: DISCONTINUED | OUTPATIENT
Start: 2025-05-05 | End: 2025-05-05 | Stop reason: HOSPADM

## 2025-05-05 RX ORDER — BUPIVACAINE HYDROCHLORIDE AND EPINEPHRINE 2.5; 5 MG/ML; UG/ML
INJECTION, SOLUTION EPIDURAL; INFILTRATION; INTRACAUDAL; PERINEURAL AS NEEDED
Status: DISCONTINUED | OUTPATIENT
Start: 2025-05-05 | End: 2025-05-05 | Stop reason: HOSPADM

## 2025-05-05 RX ORDER — CHLORHEXIDINE GLUCONATE ORAL RINSE 1.2 MG/ML
15 SOLUTION DENTAL ONCE
Status: COMPLETED | OUTPATIENT
Start: 2025-05-05 | End: 2025-05-05

## 2025-05-05 RX ORDER — CHLORHEXIDINE GLUCONATE 40 MG/ML
SOLUTION TOPICAL DAILY PRN
Status: DISCONTINUED | OUTPATIENT
Start: 2025-05-05 | End: 2025-05-05 | Stop reason: HOSPADM

## 2025-05-05 RX ORDER — MIDAZOLAM HYDROCHLORIDE 2 MG/2ML
INJECTION, SOLUTION INTRAMUSCULAR; INTRAVENOUS AS NEEDED
Status: DISCONTINUED | OUTPATIENT
Start: 2025-05-05 | End: 2025-05-05

## 2025-05-05 RX ORDER — ACETAMINOPHEN 325 MG/1
650 TABLET ORAL EVERY 6 HOURS PRN
Status: DISCONTINUED | OUTPATIENT
Start: 2025-05-05 | End: 2025-05-05 | Stop reason: HOSPADM

## 2025-05-05 RX ORDER — FENTANYL CITRATE 50 UG/ML
INJECTION, SOLUTION INTRAMUSCULAR; INTRAVENOUS AS NEEDED
Status: DISCONTINUED | OUTPATIENT
Start: 2025-05-05 | End: 2025-05-05

## 2025-05-05 RX ORDER — PROPOFOL 10 MG/ML
INJECTION, EMULSION INTRAVENOUS AS NEEDED
Status: DISCONTINUED | OUTPATIENT
Start: 2025-05-05 | End: 2025-05-05

## 2025-05-05 RX ORDER — OXYCODONE AND ACETAMINOPHEN 5; 325 MG/1; MG/1
1 TABLET ORAL EVERY 4 HOURS PRN
Status: DISCONTINUED | OUTPATIENT
Start: 2025-05-05 | End: 2025-05-05 | Stop reason: HOSPADM

## 2025-05-05 RX ORDER — OXYCODONE AND ACETAMINOPHEN 5; 325 MG/1; MG/1
1 TABLET ORAL EVERY 4 HOURS PRN
Qty: 15 TABLET | Refills: 0 | Status: SHIPPED | OUTPATIENT
Start: 2025-05-05 | End: 2025-05-15

## 2025-05-05 RX ORDER — SODIUM CHLORIDE, SODIUM LACTATE, POTASSIUM CHLORIDE, CALCIUM CHLORIDE 600; 310; 30; 20 MG/100ML; MG/100ML; MG/100ML; MG/100ML
INJECTION, SOLUTION INTRAVENOUS CONTINUOUS PRN
Status: DISCONTINUED | OUTPATIENT
Start: 2025-05-05 | End: 2025-05-05

## 2025-05-05 RX ORDER — HYDROMORPHONE HCL IN WATER/PF 6 MG/30 ML
0.2 PATIENT CONTROLLED ANALGESIA SYRINGE INTRAVENOUS
Status: DISCONTINUED | OUTPATIENT
Start: 2025-05-05 | End: 2025-05-05 | Stop reason: HOSPADM

## 2025-05-05 RX ORDER — ONDANSETRON 2 MG/ML
4 INJECTION INTRAMUSCULAR; INTRAVENOUS ONCE AS NEEDED
Status: DISCONTINUED | OUTPATIENT
Start: 2025-05-05 | End: 2025-05-05

## 2025-05-05 RX ORDER — FENTANYL CITRATE/PF 50 MCG/ML
25 SYRINGE (ML) INJECTION
Status: DISCONTINUED | OUTPATIENT
Start: 2025-05-05 | End: 2025-05-05 | Stop reason: HOSPADM

## 2025-05-05 RX ORDER — DEXAMETHASONE SODIUM PHOSPHATE 10 MG/ML
INJECTION, SOLUTION INTRAMUSCULAR; INTRAVENOUS AS NEEDED
Status: DISCONTINUED | OUTPATIENT
Start: 2025-05-05 | End: 2025-05-05

## 2025-05-05 RX ORDER — LIDOCAINE HYDROCHLORIDE 10 MG/ML
INJECTION, SOLUTION EPIDURAL; INFILTRATION; INTRACAUDAL; PERINEURAL AS NEEDED
Status: DISCONTINUED | OUTPATIENT
Start: 2025-05-05 | End: 2025-05-05

## 2025-05-05 RX ORDER — ONDANSETRON 2 MG/ML
4 INJECTION INTRAMUSCULAR; INTRAVENOUS EVERY 6 HOURS PRN
Status: DISCONTINUED | OUTPATIENT
Start: 2025-05-05 | End: 2025-05-05

## 2025-05-05 RX ORDER — CEFAZOLIN SODIUM 2 G/50ML
2000 SOLUTION INTRAVENOUS ONCE
Status: COMPLETED | OUTPATIENT
Start: 2025-05-05 | End: 2025-05-05

## 2025-05-05 RX ORDER — ONDANSETRON 2 MG/ML
INJECTION INTRAMUSCULAR; INTRAVENOUS AS NEEDED
Status: DISCONTINUED | OUTPATIENT
Start: 2025-05-05 | End: 2025-05-05

## 2025-05-05 RX ADMIN — SODIUM CHLORIDE, SODIUM LACTATE, POTASSIUM CHLORIDE, AND CALCIUM CHLORIDE: .6; .31; .03; .02 INJECTION, SOLUTION INTRAVENOUS at 13:20

## 2025-05-05 RX ADMIN — MIDAZOLAM 2 MG: 1 INJECTION INTRAMUSCULAR; INTRAVENOUS at 13:20

## 2025-05-05 RX ADMIN — CEFAZOLIN SODIUM 2000 MG: 2 SOLUTION INTRAVENOUS at 13:35

## 2025-05-05 RX ADMIN — ONDANSETRON 4 MG: 2 INJECTION INTRAMUSCULAR; INTRAVENOUS at 13:25

## 2025-05-05 RX ADMIN — DEXAMETHASONE SODIUM PHOSPHATE 10 MG: 10 INJECTION, SOLUTION INTRAMUSCULAR; INTRAVENOUS at 13:25

## 2025-05-05 RX ADMIN — PROPOFOL 200 MG: 10 INJECTION, EMULSION INTRAVENOUS at 13:25

## 2025-05-05 RX ADMIN — FENTANYL CITRATE 50 MCG: 50 INJECTION, SOLUTION INTRAMUSCULAR; INTRAVENOUS at 13:35

## 2025-05-05 RX ADMIN — FENTANYL CITRATE 50 MCG: 50 INJECTION, SOLUTION INTRAMUSCULAR; INTRAVENOUS at 13:25

## 2025-05-05 RX ADMIN — LIDOCAINE HYDROCHLORIDE 50 MG: 10 INJECTION, SOLUTION EPIDURAL; INFILTRATION; INTRACAUDAL at 13:25

## 2025-05-05 RX ADMIN — CHLORHEXIDINE GLUCONATE 15 ML: 1.2 SOLUTION ORAL at 10:15

## 2025-05-05 RX ADMIN — OXYCODONE HYDROCHLORIDE AND ACETAMINOPHEN 1 TABLET: 5; 325 TABLET ORAL at 14:42

## 2025-05-05 NOTE — ANESTHESIA POSTPROCEDURE EVALUATION
Post-Op Assessment Note    CV Status:  Stable  Pain Score: 0    Pain management: adequate    Multimodal analgesia used between 6 hours prior to anesthesia start to PACU discharge    Mental Status:  Alert and awake   Hydration Status:  Euvolemic and stable   PONV Controlled:  Controlled   Airway Patency:  Patent  Two or more mitigation strategies used for obstructive sleep apnea   Post Op Vitals Reviewed: Yes    No anethesia notable event occurred.    Staff: CRNA           Last Filed PACU Vitals:  Vitals Value Taken Time   Temp 98    Pulse 78 05/05/25 1404   /88    Resp 16 05/05/25 1404   SpO2 100 % 05/05/25 1404

## 2025-05-05 NOTE — INTERVAL H&P NOTE
H&P reviewed. After examining the patient I find no changes in the patients condition since the H&P had been written.    Vitals:    05/05/25 1011   BP: 141/85   Pulse: 70   Resp: 20   Temp: (!) 96.7 °F (35.9 °C)   SpO2: 96%

## 2025-05-05 NOTE — ANESTHESIA PREPROCEDURE EVALUATION
Procedure:  ARTHROSCOPY KNEE WITH PARTIAL MEDIAL MENISCECTOMY (Left: Knee)    Relevant Problems   CARDIO   (+) Hyperlipidemia associated with type 2 diabetes mellitus  (HCC)      ENDO   (+) Type 2 diabetes mellitus without complication, without long-term current use of insulin (HCC)      NEURO/PSYCH   (+) Anxiety   (+) Generalized anxiety disorder      PULMONARY   (+) Mild intermittent asthma without complication      Behavioral Health   (+) Cigarette nicotine dependence without complication        Physical Exam    Airway    Mallampati score: II  TM Distance: >3 FB  Neck ROM: full     Dental   No notable dental hx     Cardiovascular      Pulmonary      Other Findings        Anesthesia Plan  ASA Score- 2     Anesthesia Type- general with ASA Monitors.         Additional Monitors:     Airway Plan: LMA.           Plan Factors-    Chart reviewed.    Patient summary reviewed.    Patient is a current smoker.              Induction- intravenous.    Postoperative Plan- Plan for postoperative opioid use.         Informed Consent- Anesthetic plan and risks discussed with patient.  I personally reviewed this patient with the CRNA. Discussed and agreed on the Anesthesia Plan with the CRNA..      NPO Status:  Vitals Value Taken Time   Date of last liquid 05/05/25 05/05/25 0954   Time of last liquid 0800 05/05/25 0954   Date of last solid 05/04/25 05/05/25 0954   Time of last solid 2030 05/05/25 0954

## 2025-05-05 NOTE — OP NOTE
OPERATIVE REPORT  PATIENT NAME: Arthur Aly    :  1970  MRN: [id number]  Pt Location:  OR ROOM 01    SURGERY DATE: 2025    Surgeons and Role:     * Dimas Nicole MD - Primary     * Jennifer Nunes PA-C - Assisting    Preop Diagnosis:  Acute medial meniscus tear of left knee, initial encounter [S83.242A]    Post-Op Diagnosis Codes:     * Acute medial meniscus tear of left knee, initial encounter [S83.242A]    Procedure(s):  Left - ARTHROSCOPY KNEE WITH PARTIAL MEDIAL MENISCECTOMY    Specimen(s):  * No specimens in log *    Estimated Blood Loss:   Minimal    Drains:  * No LDAs found *    Anesthesia Type:   LMA    Operative Indications:  Acute medial meniscus tear of left knee, initial encounter [S83.242A]    * No implants in log *    Indications: Arthur Aly is a 55 y.o. years old male diagnosed with left knee medial meniscus tear. Patient failed conservative treatments and elected to proceed with surgical intervention. The risks and complications are discussed with the patient. The patient consented to the procedure.    Procedure: Patient was brought into the OR and placed in supine position. Patient was anesthetized and intubated with LMA without any complications. Patient's left knee was prep and draped in sterile fashion with tourniquet in the upper thigh. A time out was call and identified the left knee was the operating site. 3 portals were utilized for instrumentation.  Each portal was injected with 1-2 cc of Marcaine 0.5% with epinephrine.  A superior-lateral protal was use for the inflow which is set to 30 mmHg. The scope was introduced into the knee from the lateral portal. Inspection of the knee was carried out in counter clockwise fashion. A medial protal was also created for instrumentation. Patellofemoral joint showed grade 0 degenerative changes. Anterior medial plica was not present. Medial compartment showed grade focal 1 degenerative changes. Medial meniscus was torn in the posterior horn  with a fragment flipped into the medial gutter. ACL and PCL were intact. Lateral compartment showed grade 0 degenerative changes. Lateral meniscus was intact.    Attention was turn to medial compartment and partial medial meniscectomy was done back to the stable rim. Using mechanical shaver and biters to carry out the procedure. Excess fluid was drain out of the knee 25 cc of 0.5% Marcaine with epinephrine was injected into the knee joint for post-op pain control. All counts were correct.    The incisions were closed with Nylon. A sterile bulky dressing was applied. The patient tolerated the procedure well without any complications. Patient was then extubated and transferred to recovery room for post-op care. The family was contacted.    There was no qualified resident available to assist.    Mrs. Nunes was required in the OR in helping performing the minimal invasive arthroscopic techniques in meniscectomy by manipulating the knee and clearing the surgical field for better visualization, as well as assisting in utilizing the shaver, control the camera, and biter.    Complications:   None    Patient Disposition:  PACU     SIGNATURE: Dimas Nicole MD  DATE: May 5, 2025  TIME: 1:55 PM

## 2025-05-05 NOTE — DISCHARGE INSTR - AVS FIRST PAGE
The principal surgical findings in your knee were:    1. Left knee medial meniscus tear      The following corrective procedures were performed:     1. Arthroscopic partial meniscectomy       FOLLOW-UP:     You will need an appt. in: As scheduled   Please call the office at [phone].     GENERAL INSTRUCTIONS:     Apply an ice pack to your knee for the next 12-24 hours.    If crutches were prescribed, you should limit weight bearing at all times as instructed. Keep knee stiff the first day, avoid too much bending.    Take it easy for at least 24 hours. Do not drive for 3-5 days. You may move about, but stay around home or hotel.   If you have an upset stomach, take only cool, clear liquids, such as Gatorade, Jello, or Ginger ale. If nausea persists for more than 25 hours, notify my office.    Low grade temperature is not uncommon after surgery. However, if your temperature exceeds 101 degrees, please notify my office.    The Novocain in your knee will keep your knee numb until tonight. When the medicine wears off, you will feel pain for several days to one week. This is normal after arthroscopic surgery.   On the day after surgery, you may walk normally and bend the knee normally.          You may continue using a cane or crutches to minimize any discomfort the first 24 to 48 hours.    It is normal to have swelling and discomfort in the knee for several days to one week after arthroscopic surgery.    You should take on 325 mg enteric-coated aspirin in the morning and one tablet at night to help minimize the chance of developing phlebitis (clots in the vein). This should also be taken with food or a glass of milk to avoid stomach upset. Examples of enteric-coated aspirin are Ecotrin, Ascriptin, Or Bufferin. DO NOT TAKE this if you are known to be allergic to it or have a prior history of stomach ulcer disease.  NOTE:  If, after taking the enteric coated aspirin, you develop any pain in the stomach, nausea,  vomiting, or stomach irritation, you should stop the medication immediately because it may cause stomach ulcers. Also, if you continue taking this medication while you are having pain in the stomach or nausea or stomach cramps, an ulcer could develop.         To reduce pain and swelling, place several pillows under your knee for the first 24 to 48 hours. The knee should be elevated above the heart. Ice should be applied to the knee during this period and this will help decrease discomfort and swelling. Apply to the knee for 30 minutes every 2 hours.         Any prescription you received before surgery or after surgery should be filled immediately, and taken according to directions on the label. Taking the medicine with food or with a glass of milk will avoid stomach upset.         Weight bearing as per instructions from the surgeon.    EXERCISES:      Begin doing gentle exercises right away. Exercising will reduce the swelling, increase motion, and prevent muscle weakness. The following exercises should be performed during the first week and a half, following surgery.   The advanced knee exercise program will be started when you are seen in the office.       1. QUAD SETS: Straighten as straight as possible and then clench the thigh muscles tightly. Keep the muscles clenched tightly to the slow count of 3 then relax. Repeat this exercise 10-30 times every hour.    2. STRAIGHT LEG RAISING: With the knee held straight and the quadriceps muscle contracted, raise your leg up 6 to 8 inches and hold it to the slow count of 3. Repeat this exercise 10-30 times every hour.    3. RANGE OF MOTION: You should start bending your knee to the point of pain and increase bending it until full motion is obtained. Repeat this exercise 10-30 times every hour.        For the first 48 hours inhale deeply and hold your breath for 3 seconds; exhale completely. Repeat 10 times, 4 times daily.    If you smoke, avoid cigarettes for 48 hours.      BANDAGES:      Your bandage may show blood stains within 1-12 hours. This is motly fluid that was used to irrigate your knee, slightly tinged with blood. It is no cause for concern. However, if your bandage becomes saturated, notify my office right away.         You may remove the bulky dressings in 2 days and applied band aids to the small skin incisions. You may shower in 3 days after surgery.    WORK:   Plan to take 3 or 4 days off from work. You can resume work when you are comfortable. (This can be a week or more depending on the type of work you do). Do not walk or stand for excessive periods. Do not operate heavy machinery that requires pedals.

## 2025-05-06 NOTE — ANESTHESIA POSTPROCEDURE EVALUATION
Post-Op Assessment Note    CV Status:  Stable    Pain management: adequate       Mental Status:  Alert and awake   Hydration Status:  Euvolemic and stable   PONV Controlled:  None   Airway Patency:  Patent     Post Op Vitals Reviewed: Yes    No anethesia notable event occurred.    Staff: Anesthesiologist           Last Filed PACU Vitals:  Vitals Value Taken Time   Temp 97.3 °F (36.3 °C) 05/05/25 1404   Pulse 70 05/05/25 1432   /91 05/05/25 1430   Resp 12 05/05/25 1432   SpO2 97 % 05/05/25 1432   Vitals shown include unfiled device data.    Modified Lul:     Vitals Value Taken Time   Activity 2 05/05/25 1404   Respiration 2 05/05/25 1404   Circulation 2 05/05/25 1404   Consciousness 2 05/05/25 1404   Oxygen Saturation 2 05/05/25 1404     Modified Lul Score: 10

## 2025-05-08 ENCOUNTER — TELEPHONE (OUTPATIENT)
Age: 55
End: 2025-05-08

## 2025-05-08 NOTE — TELEPHONE ENCOUNTER
Caller: Patient     Doctor: Dr. Nicole    Reason for call: Patient would like to know how long he should take the Asprin for. Surgery was on 5/5/25.    Call back#: [phone]

## 2025-05-08 NOTE — TELEPHONE ENCOUNTER
Spoke to him and reiterated that it is 81 mg daily . Advised I spoke to Dr. Nicole's PA who said that the 325 mg BID was incorrect.  He can take ibuprofen and tylenol for pain, as he does not want to take his oxycodone

## 2025-05-20 ENCOUNTER — OFFICE VISIT (OUTPATIENT)
Dept: OBGYN CLINIC | Facility: CLINIC | Age: 55
End: 2025-05-20

## 2025-05-20 VITALS — BODY MASS INDEX: 26.07 KG/M2 | HEIGHT: 69 IN | WEIGHT: 176 LBS

## 2025-05-20 DIAGNOSIS — Z47.89 AFTERCARE FOLLOWING SURGERY OF THE MUSCULOSKELETAL SYSTEM: Primary | ICD-10-CM

## 2025-05-20 PROCEDURE — 99024 POSTOP FOLLOW-UP VISIT: CPT | Performed by: PHYSICIAN ASSISTANT

## 2025-05-20 NOTE — LETTER
May 20, 2025     Patient: Arthur Aly  YOB: 1970  Date of Visit: 5/20/2025      To Whom it May Concern:    Arthur Aly is under my professional care. Arthur was seen in my office on 5/20/2025. Arthur can return to work on 5/21/25 with restrictions. No kneeling or climbing high ladders. Can use 2 step ladder.     If you have any questions or concerns, please don't hesitate to call.         Sincerely,          Jennifer Nunes PA-C        CC: No Recipients

## 2025-05-20 NOTE — ASSESSMENT & PLAN NOTE
Arthur is doing very well status post left knee arthroscopy with partial medial meniscectomy on May 5, 2025.  Sutures were removed and arthroscopy pictures were reviewed with the patient.  Mechanical symptoms have resolved.  Recommend low impact exercises such as stationary bicycle or swimming.  Avoid any repetitive squatting, kneeling or climbing.  Work note was given with restrictions.  Continue Tylenol and Motrin as needed for pain.  Follow-up in 4 weeks for reevaluation with Dr. Nicole. All patient's questions were answered to his satisfaction.  This note is created using dictation transcription.  It may contain typographical errors, grammatical errors, improperly dictated words, background noise and other errors.

## 2025-05-20 NOTE — PROGRESS NOTES
Assessment:     1. Aftercare following surgery of the musculoskeletal system        Plan:     Problem List Items Addressed This Visit          Orthopedic/Musculoskeletal    Aftercare following surgery of the musculoskeletal system - Primary    Arthur is doing very well status post left knee arthroscopy with partial medial meniscectomy on May 5, 2025.  Sutures were removed and arthroscopy pictures were reviewed with the patient.  Mechanical symptoms have resolved.  Recommend low impact exercises such as stationary bicycle or swimming.  Avoid any repetitive squatting, kneeling or climbing.  Work note was given with restrictions.  Continue Tylenol and Motrin as needed for pain.  Follow-up in 4 weeks for reevaluation with Dr. Nicole. All patient's questions were answered to his satisfaction.  This note is created using dictation transcription.  It may contain typographical errors, grammatical errors, improperly dictated words, background noise and other errors.             Subjective:     Patient ID: Arthur Aly is a 55 y.o. male.  Chief Complaint:  This is a 55-year-old white male who is status post left knee arthroscopy with partial medial meniscectomy on May 5, 2025.  He arrives ambulating with no assistance.  He feels the sharp pain he had prior surgery has completely resolved.  He feels very minimal soreness and discomfort at this point.  He has been working on range of motion and isometric quad exercises.  He is alternating between Tylenol and Motrin for any pain.  He denies any fevers or chills.  He is hoping to return to work tomorrow with restrictions.      Allergy:  Allergies[1]  Medications:  all current active meds have been reviewed  Past Medical History:  Past Medical History:   Diagnosis Date    Anxiety     Asthma     Depression     Diabetes mellitus (HCC)     Diverticulitis     Eczema     Hyperlipemia     RESOLVED [id number]    Left knee pain     Medial meniscus tear     LAST ASSESSED [id number]    Moderate  single current episode of major depressive disorder (HCC) 03/11/2019    Psychiatric illness     Suicide attempt (HCC)      Past Surgical History:  Past Surgical History:   Procedure Laterality Date    ARTHROSCOPY KNEE Right     ONSET 7/3/2014    ARTHROSCOPY KNEE Left 5/5/2025    Procedure: ARTHROSCOPY KNEE WITH PARTIAL MEDIAL MENISCECTOMY;  Surgeon: Dimas Nicole MD;  Location:  MAIN OR;  Service: Orthopedics    COLONOSCOPY      CO LAPAROSCOPY COLECTOMY PARTIAL W/ANASTOMOSIS N/A 07/09/2019    Procedure: RESECTION COLON SIGMOID LAPAROSCOPIC WITH ANASTOMOSIS: INTRAOP COLONOSCOPY;  Surgeon: Malcolm Gutierrez MD;  Location:  MAIN OR;  Service: General    TONSILLECTOMY AND ADENOIDECTOMY      TOOTH EXTRACTION      WISDOM TEETH     Family History:  Family History   Problem Relation Age of Onset    Diabetes Mother     Hypertension Mother     Colon polyps Mother     Prostate cancer Father     Substance Abuse Neg Hx     Mental illness Neg Hx      Social History:  Social History     Substance and Sexual Activity   Alcohol Use Not Currently    Alcohol/week: 3.0 standard drinks of alcohol    Types: 3 Cans of beer per week    Comment: couple beers a day     Social History     Substance and Sexual Activity   Drug Use Not Currently    Types: Marijuana    Comment: MEDICAL MARIJUANA CARD      Tobacco Use History[2]  Review of Systems   Constitutional: Negative.    HENT: Negative.     Eyes: Negative.    Respiratory: Negative.     Cardiovascular: Negative.    Gastrointestinal: Negative.    Endocrine: Negative.    Genitourinary: Negative.    Musculoskeletal:  Positive for arthralgias (left knee) and joint swelling (left knee). Negative for gait problem.   Skin: Negative.    Neurological: Negative.    Hematological: Negative.    Psychiatric/Behavioral: Negative.           Objective:  BP Readings from Last 1 Encounters:   05/05/25 146/87      Wt Readings from Last 1 Encounters:   05/20/25 79.8 kg (176 lb)      BMI:   Estimated body  "mass index is 25.99 kg/m² as calculated from the following:    Height as of this encounter: 5' 9\" (1.753 m).    Weight as of this encounter: 79.8 kg (176 lb).  BSA:   Estimated body surface area is 1.96 meters squared as calculated from the following:    Height as of this encounter: 5' 9\" (1.753 m).    Weight as of this encounter: 79.8 kg (176 lb).   Physical Exam  Constitutional:       General: He is not in acute distress.     Appearance: He is well-developed.   HENT:      Head: Normocephalic.     Eyes:      Conjunctiva/sclera: Conjunctivae normal.      Pupils: Pupils are equal, round, and reactive to light.     Pulmonary:      Effort: Pulmonary effort is normal. No respiratory distress.     Musculoskeletal:      Left knee: No effusion.     Skin:     General: Skin is warm and dry.     Neurological:      Mental Status: He is alert and oriented to person, place, and time.     Psychiatric:         Behavior: Behavior normal.       Left Knee Exam     Tenderness   Left knee tenderness location: mild diffuse medial.    Range of Motion   The patient has normal left knee ROM.    Tests   Varus: negative Valgus: negative    Other   Erythema: absent  Scars: present (Well-healing portal sites with no evidence of infection.  No active drainage.)  Sensation: normal  Pulse: present  Swelling: mild  Effusion: no effusion present    Comments:  Calf soft, nontender           No new imaging.    Procedure: Sutures removed from left knee.       [1]   Allergies  Allergen Reactions    Erythromycin Diarrhea and Abdominal Pain   [2]   Social History  Tobacco Use   Smoking Status Every Day   Smokeless Tobacco Never   Tobacco Comments    Vapes nicotene daily     "

## 2025-06-03 DIAGNOSIS — E11.9 TYPE 2 DIABETES MELLITUS WITHOUT COMPLICATION, WITHOUT LONG-TERM CURRENT USE OF INSULIN (HCC): ICD-10-CM

## 2025-06-04 ENCOUNTER — TELEPHONE (OUTPATIENT)
Age: 55
End: 2025-06-04

## 2025-06-04 DIAGNOSIS — R06.2 WHEEZE: Primary | ICD-10-CM

## 2025-06-04 RX ORDER — EMPAGLIFLOZIN 25 MG/1
25 TABLET, FILM COATED ORAL DAILY
Qty: 90 TABLET | Refills: 0 | Status: SHIPPED | OUTPATIENT
Start: 2025-06-04

## 2025-06-04 RX ORDER — ALBUTEROL SULFATE 90 UG/1
2 INHALANT RESPIRATORY (INHALATION) EVERY 6 HOURS PRN
Qty: 8 G | Refills: 5 | Status: SHIPPED | OUTPATIENT
Start: 2025-06-04

## 2025-06-04 RX ORDER — LISINOPRIL 20 MG/1
20 TABLET ORAL DAILY
Qty: 90 TABLET | Refills: 0 | Status: SHIPPED | OUTPATIENT
Start: 2025-06-04

## 2025-06-04 NOTE — TELEPHONE ENCOUNTER
Patient states his asthma has been acting up, he's been wheezing and coughing lately. He wants to know if Dr Fernandez will reorder the albuterol for him. Please advise.  [facility] - [place] - [phone]  [phone]

## 2025-06-06 ENCOUNTER — OFFICE VISIT (OUTPATIENT)
Dept: SLEEP CENTER | Facility: CLINIC | Age: 55
End: 2025-06-06
Payer: COMMERCIAL

## 2025-06-06 VITALS
OXYGEN SATURATION: 98 % | HEART RATE: 126 BPM | DIASTOLIC BLOOD PRESSURE: 76 MMHG | WEIGHT: 176 LBS | SYSTOLIC BLOOD PRESSURE: 119 MMHG | BODY MASS INDEX: 26.07 KG/M2 | HEIGHT: 69 IN

## 2025-06-06 DIAGNOSIS — R06.83 SNORING: Primary | ICD-10-CM

## 2025-06-06 DIAGNOSIS — F41.1 GENERALIZED ANXIETY DISORDER: ICD-10-CM

## 2025-06-06 DIAGNOSIS — E11.9 TYPE 2 DIABETES MELLITUS WITHOUT COMPLICATION, WITHOUT LONG-TERM CURRENT USE OF INSULIN (HCC): ICD-10-CM

## 2025-06-06 DIAGNOSIS — I10 ESSENTIAL HYPERTENSION: ICD-10-CM

## 2025-06-06 PROCEDURE — 99204 OFFICE O/P NEW MOD 45 MIN: CPT | Performed by: STUDENT IN AN ORGANIZED HEALTH CARE EDUCATION/TRAINING PROGRAM

## 2025-06-06 NOTE — PATIENT INSTRUCTIONS
"- A sleep study was ordered for you. It can be an in lab sleep study or a portable at home sleep study. It depends on what insurance approves.  Some insurances will only cover home sleep studies unless you have significant medical conditions like stroke or heart attack within the last 6 months, severe COPD, or the use of supplemental oxygen.    - The order goes electronically to the sleep center staff.    - The sleep center will get approval from your insurance and then will call you to schedule the sleep study.    - If you do not hear from the sleep center in 1 week, please call us to check the status of your order.    - There are different locations to get sleep study done.    - Please make sure you know what is the copay associated with your sleep study. Approval does not mean coverage.     - Once your sleep study is done, it can take up to 2 weeks to get results (depending on the volume).    - A follow up appointment to discuss sleep study results is required in most cases. On that visit, we will discuss results and treatment options.    - If your sleep study shows severe sleep apnea please expect contact from the sleep center. We will try to expedite your follow up appointment.    - The best way to communicate with us in through [facility]. Make sure your account is active.    - Once you start CPAP therapy, it is mandatory by insurance, to have a follow up appointment with us within 31-90 days of getting CPAP.     Patient Education     Sleep apnea in adults   The Basics   Written by the doctors and editors at [facility]   What is sleep apnea? -- Sleep apnea is a condition that makes you stop breathing for short periods while you are asleep. There are 2 types of sleep apnea. One is called \"obstructive sleep apnea.\" The other is called \"central sleep apnea.\"  In obstructive sleep apnea, you stop breathing because your throat narrows or closes (figure 1). In central sleep apnea, you stop breathing because your " "brain does not send the right signals to your muscles to make you breathe. When people talk about sleep apnea, they are usually referring to obstructive sleep apnea, which is what this article is about.  People with sleep apnea do not know that they stop breathing when they are asleep. But they do sometimes wake up startled or gasping for breath. They also often hear from loved ones that they snore.  What are the symptoms of sleep apnea? -- The main symptoms of sleep apnea are loud snoring, tiredness, and daytime sleepiness. Other symptoms can include:   Restless sleep   Waking up choking or gasping   Morning headaches, dry mouth, or sore throat   Waking up often to urinate   Waking up feeling unrested or groggy   Trouble thinking clearly or remembering things  Some people with sleep apnea don't have symptoms, or don't realize that they have them.  Should I see a doctor or nurse? -- Yes. If you think that you might have sleep apnea, see your doctor.  Is there a test for sleep apnea? -- Yes. First, your doctor or nurse will ask about your symptoms. If you have a bed partner, they might also ask that person if you snore or gasp in your sleep. If the doctor or nurse suspects that you have sleep apnea, they might send you for a \"sleep study.\"  Sleep studies can sometimes be done at home, but they are usually done in a sleep lab. For the study, you spend the night in the lab, and you are hooked up to different machines that monitor your heart rate, breathing, and other body functions. The results of the test tell your doctor or nurse if you have the disorder.  Is there anything I can do on my own to help my sleep apnea? -- Yes. Some things that might help:   Try to avoid sleeping on your back, if possible. This might help some people.   Lose weight, if you have excess body weight.   Get regular physical activity. This might help you lose weight. But even if it doesn't, being active is good for your health.   Avoid " "alcohol, especially in the evening. Alcohol can make sleep apnea worse.  How is sleep apnea treated? -- Treatment can include:   Weight loss - As mentioned above, weight loss can help if you have excess weight or obesity. But losing weight can be challenging, and it takes time to lose enough weight to help with your sleep apnea. Most people need other treatment while they work on losing weight.   CPAP - The most effective treatment for sleep apnea is a device that keeps your airway open while you sleep. Treatment with this device is called \"continuous positive airway pressure\" (\"CPAP\"). People getting CPAP wear a face mask at night that keeps them breathing (figure 2).  If your doctor or nurse recommends a CPAP machine, be patient about using it. The mask might seem uncomfortable to wear at first, and the machine might seem noisy, but using the machine can really help you. People with sleep apnea who use a CPAP machine feel more rested and generally feel better.  If CPAP does not work, your doctor might suggest other treatment. Options might include:   An oral device - This is a device that you wear in your mouth. It is called an \"oral appliance\" or \"mandibular advancement device.\" It helps keep your airway open while you sleep.   Hypoglossal nerve stimulation - This involves a procedure to implant a small device into your chest. The device has a wire that connects to the nerve under your tongue. While you are sleeping, it sends an electrical signal that causes the tongue to push forward. This helps open up your airway.   Surgery to widen your airway - This might involve removing your tonsils or other tissue that blocks the airway.  Is sleep apnea dangerous? -- It can be. Risks include:   Accidents - People with sleep apnea do not get good-quality sleep, so they are often tired and not alert. This puts them at risk for car accidents and other types of accidents.   Other health problems - Studies show that people " with sleep apnea are more likely than others to have high blood pressure, heart attacks, and other serious heart problems. Some people also have mood changes or depression.  In people with severe sleep apnea, getting treated (for example, with CPAP) can help lower these risks.  All topics are updated as new evidence becomes available and our peer review process is complete.  This topic retrieved from [employer] on: Feb 28, 2024.  Topic 38416 Version 18.0  Release: 32.2.4 - C32.58  © 2024 UpToDate, Inc. and/or its affiliates. All rights reserved.  figure 1: Airway in a person with sleep apnea     Normally, when a person sleeps, the airway remains open, and air can pass from the nose and mouth to the lungs. In a person with sleep apnea, parts of the throat and mouth drop into the airway and block off the flow of air. This can cause loud snoring and interrupt breathing for short periods.  Graphic 40975 Version 6.0  figure 2: Continuous positive airway pressure (CPAP) for sleep apnea     The CPAP mask gently blows air into your nose while you sleep. It puts just enough pressure on your airway to keep it from closing. The mask in this picture fits over just the nose. Other CPAP devices have masks that fit over the nose and mouth.  Graphic 96765 Version 5.0  Consumer Information Use and Disclaimer   Disclaimer: This generalized information is a limited summary of diagnosis, treatment, and/or medication information. It is not meant to be comprehensive and should be used as a tool to help the user understand and/or assess potential diagnostic and treatment options. It does NOT include all information about conditions, treatments, medications, side effects, or risks that may apply to a specific patient. It is not intended to be medical advice or a substitute for the medical advice, diagnosis, or treatment of a health care provider based on the health care provider's examination and assessment of a patient's specific and unique  circumstances. Patients must speak with a health care provider for complete information about their health, medical questions, and treatment options, including any risks or benefits regarding use of medications. This information does not endorse any treatments or medications as safe, effective, or approved for treating a specific patient. UpToDate, Inc. and its affiliates disclaim any warranty or liability relating to this information or the use thereof.The use of this information is governed by the Terms of Use, available at https://www.woltersMeetCastuwer.com/en/know/clinical-effectiveness-terms. 2024© UpToDate, Inc. and its affiliates and/or licensors. All rights reserved.  Copyright   © 2024 UpToDate, Inc. and/or its affiliates. All rights reserved.

## 2025-06-06 NOTE — ASSESSMENT & PLAN NOTE
With reported history of type 2 diabetes. With most recent A1C of   Lab Results   Component Value Date    HGBA1C 7.2 (A) 03/25/2025   . There is an association between sleep and glucose control and insulin sensitivity.  I have encouraged the patient to aim for at least 7 to 9 hours of sleep nightly for overall health and wellness.  For sleep testing as above.    Lab Results   Component Value Date    HGBA1C 7.2 (A) 03/25/2025       Orders:    Home Sleep Study; Future

## 2025-06-06 NOTE — ASSESSMENT & PLAN NOTE
We reviewed the association between sleep, mood, and coexisting psychiatric disorders. We discussed the importance of obtaining adequate sleep of at least 7 hours nightly. Patient was encouraged to continue current prescribed medications and to continue follow up with their PCP/psychiatrist for further management.     Orders:    Home Sleep Study; Future

## 2025-06-06 NOTE — PROGRESS NOTES
Name: Arthur Aly      : 1970      MRN: [id number]  Encounter Provider: Kike Levy MD  Encounter Date: 2025   Encounter department: [facility] SLEEP MEDICINE [facility]    :  Assessment & Plan  Snoring  Snoring / Concern for Obstructive Sleep Apnea - Discussed pathophysiology of OBED, consequences of untreated OBED and treatment options. - Discussed risks of sleepy driving and mitigation strategies (napping). Patient agrees to not drive if tired or sleepy. - Advised avoidance of alcohol and centrally acting medications as these can worsen OBED.  - Arthur presents today for new patient evaluation of snoring, witnessed apnea, airway crowding on exam.  Their collective clinical signs and symptoms may be suggestive of undiagnosed sleep disordered breathing.  Home sleep study order has been placed today in the office.  - I have asked the patient to return to the office once sleep testing is completed to review study results and discuss treatment options.  Patient verbalized understanding and stated that they would do so.    Orders:    Home Sleep Study; Future    Type 2 diabetes mellitus without complication, without long-term current use of insulin (HCC)  With reported history of type 2 diabetes. With most recent A1C of   Lab Results   Component Value Date    HGBA1C 7.2 (A) 2025   . There is an association between sleep and glucose control and insulin sensitivity.  I have encouraged the patient to aim for at least 7 to 9 hours of sleep nightly for overall health and wellness.  For sleep testing as above.    Lab Results   Component Value Date    HGBA1C 7.2 (A) 2025       Orders:    Home Sleep Study; Future    Generalized anxiety disorder  We reviewed the association between sleep, mood, and coexisting psychiatric disorders. We discussed the importance of obtaining adequate sleep of at least 7 hours nightly. Patient was encouraged to continue current prescribed medications and to continue  "follow up with their PCP/psychiatrist for further management.     Orders:    Home Sleep Study; Future    Essential hypertension  Hypertension - We reviewed the association between untreated obstructive sleep apnea and the increased risk for hypertension. Patient to continue on prescribed anti-hypertensive therapy and follow up with PCP for continuity of care.     Orders:    Home Sleep Study; Future      History of Present Illness         Sitting and reading: (Patient-Rptd) Would never doze  Watching TV: (Patient-Rptd) Moderate chance of dozing  Sitting, inactive in a public place (e.g. a theatre or a meeting): (Patient-Rptd) Slight chance of dozing  As a passenger in a car for an hour without a break: (Patient-Rptd) Would never doze  Lying down to rest in the afternoon when circumstances permit: (Patient-Rptd) Would never doze  Sitting and talking to someone: (Patient-Rptd) Would never doze  Sitting quietly after a lunch without alcohol: (Patient-Rptd) Would never doze  In a car, while stopped for a few minutes in traffic: (Patient-Rptd) Would never doze  Total score: (Patient-Rptd) 3         Pertinent positives/negatives included in HPI and also as noted:     Objective   /76 (BP Location: Right arm, Patient Position: Sitting, Cuff Size: Standard)   Pulse (!) 126   Ht 5' 9\" (1.753 m)   Wt 79.8 kg (176 lb)   SpO2 98%   BMI 25.99 kg/m²     Neck Circumference: 15  [facility] [place] New Patient Evaluation    Mr. Aly is a 55 y.o. male with a PMH of anxiety, type 2 diabetes, hypertension, mild intermittent asthma, who presents as a new patient for evaluation of Sleep Disordered Breathing.     I have reviewed the 3/25/25 family medicine office note with office note with GISELL Xavier.    I have reviewed the 5/20/25 orthopedics office note with Jennifer Nunes PA-C.    History of Presenting Illness:    The patient snores. There are choking and gasping episodes. There are witnessed apneas. 1-2x episode(s) " of nocturia occur per night. The patient sleeps on his side and back, tossing and turning. There are no reports of nocturnal behaviors.     The patient's [place] sleepiness scale score is 3/24 (<=10 is normal). He has not been sleepy while driving. He has not had a fall-asleep motor vehicle accident. There are no reports of hypnagogic hallucinations, cataplexy or sleep paralysis.    In terms of the patient's sleep/wake cycle symptoms:  Bedtime: 8-8:45 PM  SOL: Less than 15 minutes  Nocturnal awakenings: 1-2 X, nocturia  Wakeup time: 4:30am   Naps: Denied, but would have given the opportunity    Total sleep time estimate: Approximately 6 hours.     On weekends he will fall asleep around 10 PM and sleep until 9 AM.    He has tried Melatonin and Benadryl for poor sleep in the past.    His legs do not bother him on trying to initiate sleep.    Past Medical History[1]     Past Surgical History[2]     Reports prior tonsillectomy.     Allergies[3]     Medications Ordered Prior to Encounter[4]      Family History: His family history includes Colon polyps in his mother; Diabetes in his mother; Hypertension in his mother; Prostate cancer in his father.    No known family history of upper airway surgeries.      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    Review of Systems: On review of systems, the patient reports: No AM Headaches, endorsed regular nasal sinus congestion, does wake with dry mouth and dry throat in morning.    Vitals:    06/06/25 1102   BP: 119/76   Pulse: (!) 126   SpO2: 98%       Physical Examination:  Gen: No acute distress, not visibly anxious, speaking comfortably  H&N: MM III; no  "retro/micrognathia; no macroglossia; no visible thyromegaly  Neuro: Alert and oriented x3, interactive  Psych: Pleasant, normal affect  Skin: No visible rashes  Respiratory: No accessory muscle use, breathing comfortably  Cardiac: No visible edema of extremities  Abdomen: Soft, NT, nondistended  Musculoskeletal: Normal ROM Intact of Extremities    Study Results:  I reviewed the following labs:    No results found for: \"FERRITIN\"    Lab Results   Component Value Date    WBC 4.7 04/24/2025    HGB 15.4 04/24/2025    HCT 46.2 04/24/2025    MCV 97 04/24/2025     04/24/2025       This SmartLink has not been configured with any valid records.       Lab Results   Component Value Date    SODIUM 137 04/24/2025    K 4.4 04/24/2025     04/24/2025    CO2 18 (L) 04/24/2025    BUN 16 04/24/2025    CREATININE 0.81 04/24/2025    GLUC 149 (H) 04/24/2025    CALCIUM 9.2 09/28/2024       This SmartLink has not been configured with any valid records.       No results found for: \"[id number]\", \"TSH\"       Results/Data:  I have reviewed the results and report from the 6/22/24 CT head.    Independent Findings Reviewed: Normal, no hemorrhage, unremarkable white matter, no mass effect.    I have reviewed the results and report from the 6/28/2019 chest x-ray.    Independent Findings Reviewed: No acute cardiopulmonary disease.    I answered the patient's questions to the best of my ability. We will continue with longitudinal follow-up for evaluation of sleep disordered breathing. Follow-up will be after sleep testing is completed.    Kike Levy MD  Sleep Medicine and Internal Medicine  [facility]  06/06/25      Portions of the record may have been created with voice recognition software.  Occasional wrong word or \"sound a like\" substitutions may have occurred due to the inherent limitations of voice recognition software.  Read the chart carefully and recognize, using context, where substitutions " have occurred.            [1]   Past Medical History:  Diagnosis Date    Anxiety     Asthma     Depression     Diabetes mellitus (HCC)     Diverticulitis     Eczema     Hyperlipemia     RESOLVED [id number]    Left knee pain     Medial meniscus tear     LAST ASSESSED [id number]    Moderate single current episode of major depressive disorder (HCC) 03/11/2019    Psychiatric illness     Suicide attempt ([facility])    [2]   Past Surgical History:  Procedure Laterality Date    ARTHROSCOPY KNEE Right     ONSET 7/3/2014    ARTHROSCOPY KNEE Left 5/5/2025    Procedure: ARTHROSCOPY KNEE WITH PARTIAL MEDIAL MENISCECTOMY;  Surgeon: Dimas Nicole MD;  Location:  MAIN OR;  Service: Orthopedics    COLONOSCOPY      MS LAPAROSCOPY COLECTOMY PARTIAL W/ANASTOMOSIS N/A 07/09/2019    Procedure: RESECTION COLON SIGMOID LAPAROSCOPIC WITH ANASTOMOSIS: INTRAOP COLONOSCOPY;  Surgeon: Malcolm Gutierrez MD;  Location:  MAIN OR;  Service: General    TONSILLECTOMY AND ADENOIDECTOMY      TOOTH EXTRACTION      WISDOM TEETH   [3]   Allergies  Allergen Reactions    Erythromycin Diarrhea and Abdominal Pain   [4]   Current Outpatient Medications on File Prior to Visit   Medication Sig Dispense Refill    acetaminophen (TYLENOL) 500 mg tablet Take 500-1,000 mg by mouth every 6 (six) hours as needed for mild pain      albuterol (ProAir HFA) 90 mcg/act inhaler Inhale 2 puffs every 6 (six) hours as needed for wheezing 8 g 5    atorvastatin (LIPITOR) 10 mg tablet Take 1 tablet by mouth once daily 90 tablet 1    clonazePAM (KlonoPIN) 2 mg tablet Take 1 tablet by mouth twice daily 60 tablet 5    Empagliflozin (Jardiance) 25 MG TABS Take 1 tablet by mouth once daily 90 tablet 0    glipiZIDE (GLUCOTROL) 5 mg tablet TAKE 1 TABLET BY MOUTH TWICE DAILY BEFORE MEAL(S) 180 tablet 1    lisinopril (ZESTRIL) 20 mg tablet Take 1 tablet by mouth once daily 90 tablet 0    metFORMIN (GLUCOPHAGE-XR) 750 mg 24 hr tablet Take 750 mg by mouth in the morning and 750 mg before  bedtime.      naproxen (NAPROSYN) 500 mg tablet Take 500 mg by mouth in the morning and 500 mg in the evening. Take with meals.       No current facility-administered medications on file prior to visit.

## 2025-06-06 NOTE — LETTER
2025     SYBIL Loomis MD  [facility] [address]  [facility] [address]    Patient: Arthur Aly   YOB: 1970   Date of Visit: 2025       Dear Dr. SYBIL Loomis MD:    Thank you for referring Arthur Aly to me for evaluation. Below are my notes for this consultation.    If you have questions, please do not hesitate to call me. I look forward to following your patient along with you.         Sincerely,        Kike Levy MD        CC: No Recipients    Kike Levy MD  2025 11:33 AM  Sign when Signing Visit  Name: Arthur Aly      : 1970      MRN: [id number]  Encounter Provider: Kike Levy MD  Encounter Date: 2025   Encounter department: [facility] [place]    :  Assessment & Plan  Snoring  Snoring / Concern for Obstructive Sleep Apnea - Discussed pathophysiology of OBED, consequences of untreated OBED and treatment options. - Discussed risks of sleepy driving and mitigation strategies (napping). Patient agrees to not drive if tired or sleepy. - Advised avoidance of alcohol and centrally acting medications as these can worsen OBED.  - Arthur presents today for new patient evaluation of snoring, witnessed apnea, airway crowding on exam.  Their collective clinical signs and symptoms may be suggestive of undiagnosed sleep disordered breathing.  Home sleep study order has been placed today in the office.  - I have asked the patient to return to the office once sleep testing is completed to review study results and discuss treatment options.  Patient verbalized understanding and stated that they would do so.    Orders:  •  Home Sleep Study; Future    Type 2 diabetes mellitus without complication, without long-term current use of insulin (HCC)  With reported history of type 2 diabetes. With most recent A1C of   Lab Results   Component Value Date    HGBA1C 7.2 (A) 2025   . There is an association  "between sleep and glucose control and insulin sensitivity.  I have encouraged the patient to aim for at least 7 to 9 hours of sleep nightly for overall health and wellness.  For sleep testing as above.    Lab Results   Component Value Date    HGBA1C 7.2 (A) 03/25/2025       Orders:  •  Home Sleep Study; Future    Generalized anxiety disorder  We reviewed the association between sleep, mood, and coexisting psychiatric disorders. We discussed the importance of obtaining adequate sleep of at least 7 hours nightly. Patient was encouraged to continue current prescribed medications and to continue follow up with their PCP/psychiatrist for further management.     Orders:  •  Home Sleep Study; Future    Essential hypertension  Hypertension - We reviewed the association between untreated obstructive sleep apnea and the increased risk for hypertension. Patient to continue on prescribed anti-hypertensive therapy and follow up with PCP for continuity of care.     Orders:  •  Home Sleep Study; Future      History of Present Illness        Sitting and reading: (Patient-Rptd) Would never doze  Watching TV: (Patient-Rptd) Moderate chance of dozing  Sitting, inactive in a public place (e.g. a theatre or a meeting): (Patient-Rptd) Slight chance of dozing  As a passenger in a car for an hour without a break: (Patient-Rptd) Would never doze  Lying down to rest in the afternoon when circumstances permit: (Patient-Rptd) Would never doze  Sitting and talking to someone: (Patient-Rptd) Would never doze  Sitting quietly after a lunch without alcohol: (Patient-Rptd) Would never doze  In a car, while stopped for a few minutes in traffic: (Patient-Rptd) Would never doze  Total score: (Patient-Rptd) 3         Pertinent positives/negatives included in HPI and also as noted:     Objective  /76 (BP Location: Right arm, Patient Position: Sitting, Cuff Size: Standard)   Pulse (!) 126   Ht 5' 9\" (1.753 m)   Wt 79.8 kg (176 lb)   SpO2 98%   " BMI 25.99 kg/m²     Neck Circumference: 15  [facility] New Patient Evaluation    Mr. Aly is a 55 y.o. male with a PMH of anxiety, type 2 diabetes, hypertension, mild intermittent asthma, who presents as a new patient for evaluation of Sleep Disordered Breathing.     I have reviewed the 3/25/25 family medicine office note with office note with GISELL Xavier.    I have reviewed the 5/20/25 orthopedics office note with Jennifer Nunes PA-C.    History of Presenting Illness:    The patient snores. There are choking and gasping episodes. There are witnessed apneas. 1-2x episode(s) of nocturia occur per night. The patient sleeps on his side and back, tossing and turning. There are no reports of nocturnal behaviors.     The patient's [place] sleepiness scale score is 3/24 (<=10 is normal). He has not been sleepy while driving. He has not had a fall-asleep motor vehicle accident. There are no reports of hypnagogic hallucinations, cataplexy or sleep paralysis.    In terms of the patient's sleep/wake cycle symptoms:  Bedtime: 8-8:45 PM  SOL: Less than 15 minutes  Nocturnal awakenings: 1-2 X, nocturia  Wakeup time: 4:30am   Naps: Denied, but would have given the opportunity    Total sleep time estimate: Approximately 6 hours.     On weekends he will fall asleep around 10 PM and sleep until 9 AM.    He has tried Melatonin and Benadryl for poor sleep in the past.    His legs do not bother him on trying to initiate sleep.    Past Medical History[1]     Past Surgical History[2]     Reports prior tonsillectomy.     Allergies[3]     Medications Ordered Prior to Encounter[4]      Family History: His family history includes Colon polyps in his mother; Diabetes in his mother; Hypertension in his mother; Prostate cancer in his father.    No known family history of upper airway surgeries.      Social History:   Job: Works for [employer] (8-4pm)  Caffeine: 2 Cups of Coffee Daily  Alcohol: Former  "Alcohol Use  Drugs: Denied  Tobacco: Denied  Vape: Tobacco Vaping    Patient's medications, allergies, past medical, surgical, social and family histories were reviewed in the electronic medical record and updated as appropriate.    Review of Systems: On review of systems, the patient reports: No AM Headaches, endorsed regular nasal sinus congestion, does wake with dry mouth and dry throat in morning.    Vitals:    06/06/25 1102   BP: 119/76   Pulse: (!) 126   SpO2: 98%       Physical Examination:  Gen: No acute distress, not visibly anxious, speaking comfortably  H&N: MM III; no retro/micrognathia; no macroglossia; no visible thyromegaly  Neuro: Alert and oriented x3, interactive  Psych: Pleasant, normal affect  Skin: No visible rashes  Respiratory: No accessory muscle use, breathing comfortably  Cardiac: No visible edema of extremities  Abdomen: Soft, NT, nondistended  Musculoskeletal: Normal ROM Intact of Extremities    Study Results:  I reviewed the following labs:    No results found for: \"FERRITIN\"    Lab Results   Component Value Date    WBC 4.7 04/24/2025    HGB 15.4 04/24/2025    HCT 46.2 04/24/2025    MCV 97 04/24/2025     04/24/2025       This SmartLink has not been configured with any valid records.       Lab Results   Component Value Date    SODIUM 137 04/24/2025    K 4.4 04/24/2025     04/24/2025    CO2 18 (L) 04/24/2025    BUN 16 04/24/2025    CREATININE 0.81 04/24/2025    GLUC 149 (H) 04/24/2025    CALCIUM 9.2 09/28/2024       This SmartLink has not been configured with any valid records.       No results found for: \"INK1PORPDCOA\", \"TSH\"       Results/Data:  I have reviewed the results and report from the 6/22/24 CT head.    Independent Findings Reviewed: Normal, no hemorrhage, unremarkable white matter, no mass effect.    I have reviewed the results and report from the 6/28/2019 chest x-ray.    Independent Findings Reviewed: No acute cardiopulmonary disease.    I answered the patient's " "questions to the best of my ability. We will continue with longitudinal follow-up for evaluation of sleep disordered breathing. Follow-up will be after sleep testing is completed.    Kike Levy MD  Sleep Medicine and Internal Medicine  [facility]  06/06/25      Portions of the record may have been created with voice recognition software.  Occasional wrong word or \"sound a like\" substitutions may have occurred due to the inherent limitations of voice recognition software.  Read the chart carefully and recognize, using context, where substitutions have occurred.            [1]   Past Medical History:  Diagnosis Date   • Anxiety    • Asthma    • Depression    • Diabetes mellitus (HCC)    • Diverticulitis    • Eczema    • Hyperlipemia     RESOLVED [id number]   • Left knee pain    • Medial meniscus tear     LAST ASSESSED [id number]   • Moderate single current episode of major depressive disorder (HCC) 03/11/2019   • Psychiatric illness    • Suicide attempt ([facility])    [2]   Past Surgical History:  Procedure Laterality Date   • ARTHROSCOPY KNEE Right     ONSET 7/3/2014   • ARTHROSCOPY KNEE Left 5/5/2025    Procedure: ARTHROSCOPY KNEE WITH PARTIAL MEDIAL MENISCECTOMY;  Surgeon: Dimas Nicole MD;  Location:  MAIN OR;  Service: Orthopedics   • COLONOSCOPY     • OR LAPAROSCOPY COLECTOMY PARTIAL W/ANASTOMOSIS N/A 07/09/2019    Procedure: RESECTION COLON SIGMOID LAPAROSCOPIC WITH ANASTOMOSIS: INTRAOP COLONOSCOPY;  Surgeon: Malcolm Gutierrez MD;  Location:  MAIN OR;  Service: General   • TONSILLECTOMY AND ADENOIDECTOMY     • TOOTH EXTRACTION      WISDOM TEETH   [3]   Allergies  Allergen Reactions   • Erythromycin Diarrhea and Abdominal Pain   [4]   Current Outpatient Medications on File Prior to Visit   Medication Sig Dispense Refill   • acetaminophen (TYLENOL) 500 mg tablet Take 500-1,000 mg by mouth every 6 (six) hours as needed for mild pain     • albuterol (ProAir HFA) 90 mcg/act inhaler Inhale " 2 puffs every 6 (six) hours as needed for wheezing 8 g 5   • atorvastatin (LIPITOR) 10 mg tablet Take 1 tablet by mouth once daily 90 tablet 1   • clonazePAM (KlonoPIN) 2 mg tablet Take 1 tablet by mouth twice daily 60 tablet 5   • Empagliflozin (Jardiance) 25 MG TABS Take 1 tablet by mouth once daily 90 tablet 0   • glipiZIDE (GLUCOTROL) 5 mg tablet TAKE 1 TABLET BY MOUTH TWICE DAILY BEFORE MEAL(S) 180 tablet 1   • lisinopril (ZESTRIL) 20 mg tablet Take 1 tablet by mouth once daily 90 tablet 0   • metFORMIN (GLUCOPHAGE-XR) 750 mg 24 hr tablet Take 750 mg by mouth in the morning and 750 mg before bedtime.     • naproxen (NAPROSYN) 500 mg tablet Take 500 mg by mouth in the morning and 500 mg in the evening. Take with meals.       No current facility-administered medications on file prior to visit.

## 2025-06-17 ENCOUNTER — OFFICE VISIT (OUTPATIENT)
Dept: OBGYN CLINIC | Facility: CLINIC | Age: 55
End: 2025-06-17

## 2025-06-17 VITALS — BODY MASS INDEX: 26.66 KG/M2 | WEIGHT: 180 LBS | HEIGHT: 69 IN

## 2025-06-17 DIAGNOSIS — Z47.89 AFTERCARE FOLLOWING SURGERY OF THE MUSCULOSKELETAL SYSTEM: Primary | ICD-10-CM

## 2025-06-17 PROBLEM — Z01.818 PRE-OP EXAM: Status: RESOLVED | Noted: 2025-04-28 | Resolved: 2025-06-17

## 2025-06-17 PROBLEM — M25.562 ACUTE PAIN OF LEFT KNEE: Status: RESOLVED | Noted: 2025-03-25 | Resolved: 2025-06-17

## 2025-06-17 PROBLEM — S83.242A ACUTE MEDIAL MENISCUS TEAR OF LEFT KNEE: Status: RESOLVED | Noted: 2025-04-23 | Resolved: 2025-06-17

## 2025-06-17 PROCEDURE — 99024 POSTOP FOLLOW-UP VISIT: CPT | Performed by: ORTHOPAEDIC SURGERY

## 2025-06-17 NOTE — PROGRESS NOTES
Assessment:     1. Aftercare following surgery of the musculoskeletal system        Plan:     Problem List Items Addressed This Visit          Orthopedic/Musculoskeletal    Aftercare following surgery of the musculoskeletal system - Primary    Arthur presents s/p left knee arthroscopy with partial medial meniscectomy performed on 5/5/2025. He states he is doing well overall at this time. Discussed with patient he can continue to progress activity as tolerated. New work note given with return to full duty without restrictions. Patient should continue with low impact exercises such as stationary bike and swimming. Discussed any repetitive squatting, lunging and kneeling could irritate the knee. Patient can use tylenol, NSAIDs and topical Voltaren or Aspercreme as needed for pain. Follow up as needed. All patient's questions were answered to his satisfaction.  This note is created using dictation transcription.  It may contain typographical errors, grammatical errors, improperly dictated words, background noise and other errors.  Subjective:              Patient ID: Arthur Aly is a 55 y.o. male.  Chief Complaint:  55 y.o. male presents today for follow up s/p left knee arthroscopy with partial medial meniscectomy performed on 5/5/2025. He states he is doing well overall at this time.  He has been continuing to get back into activity as tolerated.  He states he has no pain at today's visit.  He has been getting back to work using special kneepads to help with any residual pain, which she notes has been helping.  He is overall happy with his results.    Allergy:  Allergies[1]  Medications:  all current active meds have been reviewed  Past Medical History:  Past Medical History[2]  Past Surgical History:  Past Surgical History[3]  Family History:  Family History[4]  Social History:  Social History     Substance and Sexual Activity   Alcohol Use Not Currently    Alcohol/week: 3.0 standard drinks of alcohol    Types: 3  "Cans of beer per week    Comment: couple beers a day     Social History     Substance and Sexual Activity   Drug Use Not Currently    Types: Marijuana    Comment: MEDICAL MARIJUANA CARD      Tobacco Use History[5]  Review of Systems   Constitutional:  Negative for chills and fever.   HENT:  Negative for ear pain and sore throat.    Eyes:  Negative for pain and visual disturbance.   Respiratory:  Negative for cough and shortness of breath.    Cardiovascular:  Negative for chest pain and palpitations.   Gastrointestinal:  Negative for abdominal pain and vomiting.   Genitourinary:  Negative for dysuria and hematuria.   Musculoskeletal:  Negative for arthralgias, back pain and joint swelling.   Skin:  Negative for color change and rash.   Neurological:  Negative for seizures and syncope.   Psychiatric/Behavioral: Negative.     All other systems reviewed and are negative.        Objective:  BP Readings from Last 1 Encounters:   06/06/25 119/76      Wt Readings from Last 1 Encounters:   06/17/25 81.6 kg (180 lb)      BMI:   Estimated body mass index is 26.58 kg/m² as calculated from the following:    Height as of this encounter: 5' 9\" (1.753 m).    Weight as of this encounter: 81.6 kg (180 lb).  BSA:   Estimated body surface area is 1.97 meters squared as calculated from the following:    Height as of this encounter: 5' 9\" (1.753 m).    Weight as of this encounter: 81.6 kg (180 lb).   Physical Exam  Vitals and nursing note reviewed.   Constitutional:       Appearance: Normal appearance. He is well-developed.   HENT:      Head: Normocephalic and atraumatic.      Right Ear: External ear normal.      Left Ear: External ear normal.      Nose: Nose normal.     Eyes:      General: No scleral icterus.     Extraocular Movements: Extraocular movements intact.      Conjunctiva/sclera: Conjunctivae normal.     Pulmonary:      Effort: Pulmonary effort is normal. No respiratory distress.     Musculoskeletal:      Cervical back: Neck " supple.      Left knee: No effusion.      Instability Tests: Medial Maggie test negative and lateral Maggie test negative.      Comments: See Ortho exam     Skin:     General: Skin is warm and dry.     Neurological:      General: No focal deficit present.      Mental Status: He is alert and oriented to person, place, and time.     Psychiatric:         Mood and Affect: Mood normal.         Behavior: Behavior normal.       Left Knee Exam     Muscle Strength   The patient has normal left knee strength.    Tenderness   The patient is experiencing no tenderness.     Range of Motion   Extension:  0   Flexion:  130     Tests   Maggie:  Medial - negative Lateral - negative  Varus: negative Valgus: negative  Patellar apprehension: negative    Other   Erythema: absent  Scars: present (portal incisions well healed)  Sensation: normal  Pulse: present  Swelling: none  Effusion: no effusion present           No new images at today's visit.     Scribe Attestation      I,:  Lobito Cardenas am acting as a scribe while in the presence of the attending physician.:       I,:  Dimas Nicole MD personally performed the services described in this documentation    as scribed in my presence.:                [1]   Allergies  Allergen Reactions    Erythromycin Diarrhea and Abdominal Pain   [2]   Past Medical History:  Diagnosis Date    Acute medial meniscus tear of left knee 04/23/2025    Acute pain of left knee 03/25/2025    Anxiety     Asthma     Depression     Diabetes mellitus (HCC)     Diverticulitis     Eczema     Hyperlipemia     RESOLVED [id number]    Left knee pain     Medial meniscus tear     LAST ASSESSED [id number]    Moderate single current episode of major depressive disorder (HCC) 03/11/2019    Pre-op exam 04/28/2025    Psychiatric illness     Suicide attempt (HCC)    [3]   Past Surgical History:  Procedure Laterality Date    ARTHROSCOPY KNEE Right     ONSET 7/3/2014    ARTHROSCOPY KNEE Left 5/5/2025    Procedure:  ARTHROSCOPY KNEE WITH PARTIAL MEDIAL MENISCECTOMY;  Surgeon: Dimas Nicole MD;  Location: [facility];  Service: Orthopedics    COLONOSCOPY      IL LAPAROSCOPY COLECTOMY PARTIAL W/ANASTOMOSIS N/A 07/09/2019    Procedure: RESECTION COLON SIGMOID LAPAROSCOPIC WITH ANASTOMOSIS: INTRAOP COLONOSCOPY;  Surgeon: Malcolm Gutierrez MD;  Location: [facility] OR;  Service: General    TONSILLECTOMY AND ADENOIDECTOMY      TOOTH EXTRACTION      WISDOM TEETH   [4]   Family History  Problem Relation Name Age of Onset    Diabetes Mother Armida     Hypertension Mother Armida     Colon polyps Mother Armida     Prostate cancer Father Hero     Substance Abuse Neg Hx      Mental illness Neg Hx     [5]   Social History  Tobacco Use   Smoking Status Every Day   Smokeless Tobacco Never   Tobacco Comments    Vapes nicotene daily

## 2025-06-17 NOTE — ASSESSMENT & PLAN NOTE
Arthur presents s/p left knee arthroscopy with partial medial meniscectomy performed on 5/5/2025. He states he is doing well overall at this time. Discussed with patient he can continue to progress activity as tolerated. New work note given with return to full duty without restrictions. Patient should continue with low impact exercises such as stationary bike and swimming. Discussed any repetitive squatting, lunging and kneeling could irritate the knee. Patient can use tylenol, NSAIDs and topical Voltaren or Aspercreme as needed for pain. Follow up as needed. All patient's questions were answered to his satisfaction.  This note is created using dictation transcription.  It may contain typographical errors, grammatical errors, improperly dictated words, background noise and other errors.  Subjective:

## 2025-06-17 NOTE — LETTER
June 17, 2025     Patient: Arthur Aly  YOB: 1970  Date of Visit: 6/17/2025      To Whom it May Concern:    Arthur Aly is under my professional care. Arthur was seen in my office on 6/17/2025. Arthur can return to work without restrictions starting 6/17/2025.    If you have any questions or concerns, please don't hesitate to call.         Sincerely,          Dimas Nicole MD        CC: No Recipients

## 2025-06-20 DIAGNOSIS — R06.2 WHEEZE: ICD-10-CM

## 2025-06-23 RX ORDER — ALBUTEROL SULFATE 90 UG/1
2 INHALANT RESPIRATORY (INHALATION) EVERY 6 HOURS PRN
Qty: 8 G | Refills: 5 | Status: SHIPPED | OUTPATIENT
Start: 2025-06-23

## 2025-07-01 ENCOUNTER — HOSPITAL ENCOUNTER (OUTPATIENT)
Facility: HOSPITAL | Age: 55
Discharge: HOME | End: 2025-07-01
Attending: STUDENT IN AN ORGANIZED HEALTH CARE EDUCATION/TRAINING PROGRAM
Payer: COMMERCIAL

## 2025-07-01 DIAGNOSIS — F41.1 GENERALIZED ANXIETY DISORDER: ICD-10-CM

## 2025-07-01 DIAGNOSIS — I10 ESSENTIAL HYPERTENSION: ICD-10-CM

## 2025-07-01 DIAGNOSIS — R06.83 SNORING: ICD-10-CM

## 2025-07-01 DIAGNOSIS — E11.9 TYPE 2 DIABETES MELLITUS WITHOUT COMPLICATION, WITHOUT LONG-TERM CURRENT USE OF INSULIN (HCC): ICD-10-CM

## 2025-07-01 PROCEDURE — G0399 HOME SLEEP TEST/TYPE 3 PORTA: HCPCS | Performed by: STUDENT IN AN ORGANIZED HEALTH CARE EDUCATION/TRAINING PROGRAM

## 2025-07-01 PROCEDURE — G0399 HOME SLEEP TEST/TYPE 3 PORTA: HCPCS

## 2025-07-01 NOTE — PROGRESS NOTES
Home Sleep Study Documentation    HOME STUDY DEVICE: Noxturnal no                                           Monica G3 yes device # 8      Pre-Sleep Home Study:    Set-up and instructions performed by: Dawn    Technician performed demonstration for Patient: yes    Return demonstration performed by Patient: yes    Written instructions provided to Patient: yes    Patient signed consent form: yes          Post-Sleep Home Study:    Post Test Questionnaire Data: Pending    Additional comments by Patient: pending    Home Sleep Study Failed:pending    Failure reason: pending    Reported or Detected: pending    Scored by: pending

## 2025-07-09 PROBLEM — G47.33 OSA (OBSTRUCTIVE SLEEP APNEA): Status: ACTIVE | Noted: 2025-07-09

## 2025-07-10 ENCOUNTER — DOCUMENTATION (OUTPATIENT)
Dept: SLEEP CENTER | Facility: CLINIC | Age: 55
End: 2025-07-10

## 2025-07-10 PROBLEM — I10 ESSENTIAL HYPERTENSION: Status: ACTIVE | Noted: 2025-07-10

## 2025-07-15 ENCOUNTER — TELEPHONE (OUTPATIENT)
Age: 55
End: 2025-07-15

## 2025-07-23 DIAGNOSIS — R06.2 WHEEZE: ICD-10-CM

## 2025-07-25 RX ORDER — ALBUTEROL SULFATE 90 UG/1
2 INHALANT RESPIRATORY (INHALATION) EVERY 6 HOURS PRN
Qty: 8 G | Refills: 0 | OUTPATIENT
Start: 2025-07-25

## (undated) DEVICE — HARMONIC ACE 5MM DIAMETER SHEARS 36CM SHAFT LENGTH + ADAPTIVE TISSUE TECHNOLOGY FOR USE WITH GENERATOR G11: Brand: HARMONIC ACE

## (undated) DEVICE — SYRINGE 30ML LL

## (undated) DEVICE — SYRINGE 3ML LL

## (undated) DEVICE — IMPERVIOUS STOCKINETTE: Brand: DEROYAL

## (undated) DEVICE — 1820 FOAM BLOCK NEEDLE COUNTER: Brand: DEVON

## (undated) DEVICE — 3M™ TEGADERM™ TRANSPARENT FILM DRESSING FRAME STYLE, 1624W, 2-3/8 IN X 2-3/4 IN (6 CM X 7 CM), 100/CT 4CT/CASE: Brand: 3M™ TEGADERM™

## (undated) DEVICE — TUBING SUCTION 5MM X 12 FT

## (undated) DEVICE — DRAPE EQUIPMENT RF WAND

## (undated) DEVICE — SUT SILK 2-0 SH 30 IN K833H

## (undated) DEVICE — SPONGE LAP 18 X 18 IN STRL RFD

## (undated) DEVICE — GLOVE SRG BIOGEL 7.5

## (undated) DEVICE — ASTOUND STANDARD SURGICAL GOWN, XXL: Brand: CONVERTORS

## (undated) DEVICE — TROCAR: Brand: KII SLEEVE

## (undated) DEVICE — JP CHAN DRN SIL HUBLESS 15FR W/TRO: Brand: CARDINAL HEALTH

## (undated) DEVICE — ACCESS PLATFORM FOR MINIMALLY INVASIVE SURGERY.: Brand: GELPORT® LAPAROSCOPIC  SYSTEM

## (undated) DEVICE — TROCAR: Brand: KII FIOS FIRST ENTRY

## (undated) DEVICE — INTENDED FOR TISSUE SEPARATION, AND OTHER PROCEDURES THAT REQUIRE A SHARP SURGICAL BLADE TO PUNCTURE OR CUT.: Brand: BARD-PARKER SAFETY BLADES SIZE 11, STERILE

## (undated) DEVICE — PAD CAST 6 IN COTTON NON STERILE

## (undated) DEVICE — GLOVE INDICATOR PI UNDERGLOVE SZ 8 BLUE

## (undated) DEVICE — MAYO STAND COVER: Brand: CONVERTORS

## (undated) DEVICE — STANDARD SURGICAL GOWN, L: Brand: CONVERTORS

## (undated) DEVICE — NEEDLE 18 G X 1 1/2

## (undated) DEVICE — DRAPE LAPAROTOMY W/POUCHES

## (undated) DEVICE — SCD SEQUENTIAL COMPRESSION COMFORT SLEEVE MEDIUM KNEE LENGTH: Brand: KENDALL SCD

## (undated) DEVICE — GAUZE SPONGES,16 PLY: Brand: CURITY

## (undated) DEVICE — ENDOPATH ECHELON ENDOSCOPIC LINEAR CUTTER RELOADS, WHITE, 60MM: Brand: ECHELON ENDOPATH

## (undated) DEVICE — DRAPE TOWEL: Brand: CONVERTORS

## (undated) DEVICE — POOLE SUCTION HANDLE: Brand: CARDINAL HEALTH

## (undated) DEVICE — TOWEL SET X-RAY

## (undated) DEVICE — SUT PROLENE 2-0 CT-2 30 IN 8411H

## (undated) DEVICE — GLOVE SRG BIOGEL ECLIPSE 8

## (undated) DEVICE — SUT ETHILON 3-0 PS-1 18 IN 1663H

## (undated) DEVICE — BLADE SHAVER EXCALIBUR 4MM 13CM COOLCUT

## (undated) DEVICE — GLOVE INDICATOR PI UNDERGLOVE SZ 7 BLUE

## (undated) DEVICE — ENDOPATH 5MM CURVED SCISSORS WITH MONOPOLAR CAUTERY: Brand: ENDOPATH

## (undated) DEVICE — GLOVE INDICATOR PI UNDERGLOVE SZ 6.5 BLUE

## (undated) DEVICE — GLOVE SRG BIOGEL 6.5

## (undated) DEVICE — BETHLEHEM UNIVERSAL GYN LAP PK: Brand: CARDINAL HEALTH

## (undated) DEVICE — ECHELON FLEX POWERED PLUS ARTICULATING ENDOSCOPIC LINEAR CUTTER , 60MM: Brand: ECHELON FLEX

## (undated) DEVICE — SUT PROLENE 2-0 SH 30 IN 8833H

## (undated) DEVICE — SUT MONOCRYL 4-0 PS-2 27 IN Y426H

## (undated) DEVICE — ENDOPATH ECHELON ENDOSCOPIC LINEAR CUTTER RELOADS, BLUE, 60MM: Brand: ECHELON ENDOPATH

## (undated) DEVICE — DRAPE FLUID WARMER (BIRD BATH)

## (undated) DEVICE — INTENDED FOR TISSUE SEPARATION, AND OTHER PROCEDURES THAT REQUIRE A SHARP SURGICAL BLADE TO PUNCTURE OR CUT.: Brand: BARD-PARKER SAFETY BLADES SIZE 15, STERILE

## (undated) DEVICE — SUT VICRYL 0 CT-1 27 IN J260H

## (undated) DEVICE — PAD GROUNDING ADULT

## (undated) DEVICE — BETHLEHEM UNIVERSAL  ARTHRO PK: Brand: CARDINAL HEALTH

## (undated) DEVICE — NEEDLE HYPO 23G X 1-1/2 IN

## (undated) DEVICE — ADHESIVE SKN CLSR HISTOACRYL FLEX 0.5ML LF

## (undated) DEVICE — DRAPE SURGIKIT SADDLE BAG LAP

## (undated) DEVICE — GLOVE SRG BIOGEL ORTHOPEDIC 7.5

## (undated) DEVICE — ACE WRAP 6 IN UNSTERILE

## (undated) DEVICE — FILTER STRAW 1.7

## (undated) DEVICE — ELECTRODE BLADE MOD E-Z CLEAN 2.5IN 6.4CM -0012M

## (undated) DEVICE — ANTIBACTERIAL UNDYED BRAIDED (POLYGLACTIN 910), SYNTHETIC ABSORBABLE SUTURE: Brand: COATED VICRYL

## (undated) DEVICE — CURVED INTRALUMINAL STAPLER (ILS) 24 TITANIUM ADJUSTABLE HEIGHT STAPLES

## (undated) DEVICE — 2000CC GUARDIAN II: Brand: GUARDIAN

## (undated) DEVICE — SUT PDS II 1 CT-1 27 IN Z347H

## (undated) DEVICE — PLUMEPEN PRO 10FT

## (undated) DEVICE — CHLORAPREP HI-LITE 10.5ML ORANGE

## (undated) DEVICE — TRAY FOLEY 16FR URIMETER SURESTEP

## (undated) DEVICE — SUT VICRYL 0 REEL 54 IN J287G

## (undated) DEVICE — SUT VICRYL 0 UR-6 27 IN J603H

## (undated) DEVICE — VIAL DECANTER

## (undated) DEVICE — JACKSON-PRATT 100CC BULB RESERVOIR: Brand: CARDINAL HEALTH

## (undated) DEVICE — TROCARS: Brand: KII® BALLOON BLUNT TIP SYSTEM

## (undated) DEVICE — NEEDLE 25G X 1 1/2

## (undated) DEVICE — PENCIL ELECTROSURG E-Z CLEAN -0035H

## (undated) DEVICE — 3M™ TEGADERM™ TRANSPARENT FILM DRESSING FRAME STYLE, 1626W, 4 IN X 4-3/4 IN (10 CM X 12 CM), 50/CT 4CT/CASE: Brand: 3M™ TEGADERM™

## (undated) DEVICE — TUBING ARTHROSCOPIC WAVE  MAIN PUMP

## (undated) DEVICE — CAST PADDING 6 IN STERILE

## (undated) DEVICE — TROCAR: Brand: KII® SLEEVE

## (undated) DEVICE — GLOVE SRG BIOGEL 7